# Patient Record
Sex: FEMALE | Race: WHITE | Employment: FULL TIME | URBAN - METROPOLITAN AREA
[De-identification: names, ages, dates, MRNs, and addresses within clinical notes are randomized per-mention and may not be internally consistent; named-entity substitution may affect disease eponyms.]

---

## 2017-03-13 ENCOUNTER — OFFICE VISIT (OUTPATIENT)
Dept: INTERNAL MEDICINE CLINIC | Age: 57
End: 2017-03-13

## 2017-03-13 ENCOUNTER — HOSPITAL ENCOUNTER (OUTPATIENT)
Dept: OTHER | Age: 57
Discharge: HOME | End: 2017-03-13
Attending: NURSE PRACTITIONER | Admitting: NURSE PRACTITIONER

## 2017-03-13 VITALS
DIASTOLIC BLOOD PRESSURE: 82 MMHG | SYSTOLIC BLOOD PRESSURE: 132 MMHG | TEMPERATURE: 98.2 F | BODY MASS INDEX: 36.44 KG/M2 | WEIGHT: 219 LBS

## 2017-03-13 DIAGNOSIS — R05.9 COUGH: ICD-10-CM

## 2017-03-13 DIAGNOSIS — G43.919 INTRACTABLE MIGRAINE WITHOUT STATUS MIGRAINOSUS, UNSPECIFIED MIGRAINE TYPE: Primary | ICD-10-CM

## 2017-03-13 DIAGNOSIS — N64.89 FULLNESS OF BREAST: ICD-10-CM

## 2017-03-13 DIAGNOSIS — F41.9 ANXIETY: ICD-10-CM

## 2017-03-13 DIAGNOSIS — R07.9 CHEST PAIN, UNSPECIFIED TYPE: ICD-10-CM

## 2017-03-13 PROCEDURE — 93000 ELECTROCARDIOGRAM COMPLETE: CPT | Performed by: NURSE PRACTITIONER

## 2017-03-13 PROCEDURE — 99214 OFFICE O/P EST MOD 30 MIN: CPT | Performed by: NURSE PRACTITIONER

## 2017-03-13 RX ORDER — ALPRAZOLAM 0.5 MG/1
0.5 TABLET ORAL 2 TIMES DAILY PRN
Qty: 30 TABLET | Refills: 1 | Status: SHIPPED | OUTPATIENT
Start: 2017-03-13 | End: 2017-04-12

## 2017-03-13 RX ORDER — SUMATRIPTAN 50 MG/1
50 TABLET, FILM COATED ORAL PRN
Qty: 9 TABLET | Refills: 5 | Status: SHIPPED | OUTPATIENT
Start: 2017-03-13 | End: 2018-07-09 | Stop reason: SDUPTHER

## 2017-03-13 ASSESSMENT — ENCOUNTER SYMPTOMS
SORE THROAT: 0
FACIAL SWELLING: 0
SINUS PRESSURE: 0
DIARRHEA: 0
COUGH: 1
NAUSEA: 0
VOMITING: 0

## 2017-03-16 ENCOUNTER — HOSPITAL ENCOUNTER (OUTPATIENT)
Dept: MAMMOGRAPHY | Age: 57
Discharge: HOME | End: 2017-03-16
Attending: INTERNAL MEDICINE | Admitting: INTERNAL MEDICINE

## 2017-03-16 DIAGNOSIS — N64.89 OTHER SPECIFIED DISORDERS OF BREAST: ICD-10-CM

## 2017-04-17 ENCOUNTER — OFFICE VISIT (OUTPATIENT)
Dept: INTERNAL MEDICINE CLINIC | Age: 57
End: 2017-04-17

## 2017-04-17 VITALS
WEIGHT: 213 LBS | DIASTOLIC BLOOD PRESSURE: 76 MMHG | TEMPERATURE: 98.6 F | SYSTOLIC BLOOD PRESSURE: 130 MMHG | BODY MASS INDEX: 35.45 KG/M2

## 2017-04-17 DIAGNOSIS — I10 ESSENTIAL HYPERTENSION: Primary | ICD-10-CM

## 2017-04-17 PROCEDURE — 99213 OFFICE O/P EST LOW 20 MIN: CPT | Performed by: NURSE PRACTITIONER

## 2017-04-17 ASSESSMENT — ENCOUNTER SYMPTOMS
SINUS PRESSURE: 0
NAUSEA: 0
DIARRHEA: 0
FACIAL SWELLING: 0
VOMITING: 0
COUGH: 0
SORE THROAT: 0

## 2018-03-28 ENCOUNTER — HOSPITAL ENCOUNTER (OUTPATIENT)
Dept: MAMMOGRAPHY | Age: 58
Discharge: HOME | End: 2018-03-28
Attending: OBSTETRICS & GYNECOLOGY | Admitting: OBSTETRICS & GYNECOLOGY

## 2018-03-28 DIAGNOSIS — Z12.31 VISIT FOR SCREENING MAMMOGRAM: ICD-10-CM

## 2018-05-14 ENCOUNTER — HOSPITAL ENCOUNTER (OUTPATIENT)
Dept: OTHER | Age: 58
Discharge: HOME | End: 2018-05-14
Attending: OBSTETRICS & GYNECOLOGY | Admitting: OBSTETRICS & GYNECOLOGY

## 2018-05-14 LAB
ABO/RH: NORMAL
ALBUMIN SERPL-MCNC: 4.5 G/DL (ref 3.4–5)
ANION GAP SERPL CALCULATED.3IONS-SCNC: 13 MMOL/L (ref 3–16)
ANTIBODY SCREEN: NORMAL
BASOPHILS ABSOLUTE: 0.1 K/UL (ref 0–0.2)
BASOPHILS RELATIVE PERCENT: 0.7 %
BUN BLDV-MCNC: 24 MG/DL (ref 7–20)
CALCIUM SERPL-MCNC: 9.2 MG/DL (ref 8.3–10.6)
CHLORIDE BLD-SCNC: 102 MMOL/L (ref 99–110)
CO2: 25 MMOL/L (ref 21–32)
CREAT SERPL-MCNC: 0.8 MG/DL (ref 0.6–1.1)
EOSINOPHILS ABSOLUTE: 0.1 K/UL (ref 0–0.6)
EOSINOPHILS RELATIVE PERCENT: 1.5 %
GFR AFRICAN AMERICAN: >60
GFR NON-AFRICAN AMERICAN: >60
GLUCOSE BLD-MCNC: 93 MG/DL (ref 70–99)
HCT VFR BLD CALC: 40.8 % (ref 36–48)
HEMOGLOBIN: 13.8 G/DL (ref 12–16)
LYMPHOCYTES ABSOLUTE: 1.2 K/UL (ref 1–5.1)
LYMPHOCYTES RELATIVE PERCENT: 13.3 %
MCH RBC QN AUTO: 28.9 PG (ref 26–34)
MCHC RBC AUTO-ENTMCNC: 33.8 G/DL (ref 31–36)
MCV RBC AUTO: 85.3 FL (ref 80–100)
MONOCYTES ABSOLUTE: 0.4 K/UL (ref 0–1.3)
MONOCYTES RELATIVE PERCENT: 5.1 %
NEUTROPHILS ABSOLUTE: 6.8 K/UL (ref 1.7–7.7)
NEUTROPHILS RELATIVE PERCENT: 79.4 %
PDW BLD-RTO: 13.4 % (ref 12.4–15.4)
PHOSPHORUS: 3.5 MG/DL (ref 2.5–4.9)
PLATELET # BLD: 266 K/UL (ref 135–450)
PMV BLD AUTO: 8.9 FL (ref 5–10.5)
POTASSIUM SERPL-SCNC: 4.4 MMOL/L (ref 3.5–5.1)
RBC # BLD: 4.78 M/UL (ref 4–5.2)
SODIUM BLD-SCNC: 140 MMOL/L (ref 136–145)
WBC # BLD: 8.6 K/UL (ref 4–11)

## 2018-05-15 LAB
EKG ATRIAL RATE: 58 BPM
EKG DIAGNOSIS: NORMAL
EKG P AXIS: 42 DEGREES
EKG P-R INTERVAL: 142 MS
EKG Q-T INTERVAL: 412 MS
EKG QRS DURATION: 88 MS
EKG QTC CALCULATION (BAZETT): 404 MS
EKG R AXIS: -2 DEGREES
EKG T AXIS: 13 DEGREES
EKG VENTRICULAR RATE: 58 BPM

## 2018-05-15 PROCEDURE — 93010 ELECTROCARDIOGRAM REPORT: CPT | Performed by: INTERNAL MEDICINE

## 2018-05-18 PROBLEM — D25.9 FIBROID UTERUS: Status: ACTIVE | Noted: 2018-05-18

## 2018-05-19 PROBLEM — K57.32 DIVERTICULITIS LARGE INTESTINE W/O PERFORATION OR ABSCESS W/O BLEEDING: Status: ACTIVE | Noted: 2018-05-19

## 2018-06-11 ENCOUNTER — HOSPITAL ENCOUNTER (OUTPATIENT)
Dept: CT IMAGING | Age: 58
Discharge: HOME | End: 2018-06-11
Attending: OBSTETRICS & GYNECOLOGY | Admitting: OBSTETRICS & GYNECOLOGY

## 2018-06-11 DIAGNOSIS — C56.1 MALIGNANT NEOPLASM OF RIGHT OVARY (HCC): ICD-10-CM

## 2018-06-11 DIAGNOSIS — C56.2 OVARIAN CANCER ON LEFT (HCC): ICD-10-CM

## 2018-06-18 ENCOUNTER — OFFICE VISIT (OUTPATIENT)
Dept: INTERNAL MEDICINE CLINIC | Age: 58
End: 2018-06-18

## 2018-06-18 VITALS
OXYGEN SATURATION: 97 % | WEIGHT: 217 LBS | SYSTOLIC BLOOD PRESSURE: 122 MMHG | HEIGHT: 65 IN | DIASTOLIC BLOOD PRESSURE: 82 MMHG | BODY MASS INDEX: 36.15 KG/M2 | TEMPERATURE: 98.6 F | HEART RATE: 94 BPM

## 2018-06-18 DIAGNOSIS — Z01.818 PREOP EXAMINATION: Primary | ICD-10-CM

## 2018-06-18 DIAGNOSIS — R94.31 EKG ABNORMALITIES: ICD-10-CM

## 2018-06-18 PROCEDURE — 93000 ELECTROCARDIOGRAM COMPLETE: CPT | Performed by: NURSE PRACTITIONER

## 2018-06-18 PROCEDURE — 99214 OFFICE O/P EST MOD 30 MIN: CPT | Performed by: NURSE PRACTITIONER

## 2018-06-18 RX ORDER — ALPRAZOLAM 0.5 MG/1
TABLET ORAL
COMMUNITY
End: 2018-06-19 | Stop reason: ALTCHOICE

## 2018-06-18 ASSESSMENT — PATIENT HEALTH QUESTIONNAIRE - PHQ9
1. LITTLE INTEREST OR PLEASURE IN DOING THINGS: 0
SUM OF ALL RESPONSES TO PHQ QUESTIONS 1-9: 0
SUM OF ALL RESPONSES TO PHQ9 QUESTIONS 1 & 2: 0
2. FEELING DOWN, DEPRESSED OR HOPELESS: 0

## 2018-06-19 ENCOUNTER — HOSPITAL ENCOUNTER (OUTPATIENT)
Dept: GENERAL RADIOLOGY | Age: 58
Discharge: HOME | End: 2018-06-19
Attending: INTERNAL MEDICINE | Admitting: INTERNAL MEDICINE

## 2018-06-19 ENCOUNTER — OFFICE VISIT (OUTPATIENT)
Dept: CARDIOLOGY CLINIC | Age: 58
End: 2018-06-19

## 2018-06-19 VITALS
DIASTOLIC BLOOD PRESSURE: 74 MMHG | OXYGEN SATURATION: 92 % | BODY MASS INDEX: 36.32 KG/M2 | SYSTOLIC BLOOD PRESSURE: 126 MMHG | HEIGHT: 65 IN | WEIGHT: 218 LBS | HEART RATE: 78 BPM

## 2018-06-19 DIAGNOSIS — Z01.818 PRE-OP EXAMINATION: ICD-10-CM

## 2018-06-19 DIAGNOSIS — Z01.818 PREOP EXAMINATION: ICD-10-CM

## 2018-06-19 DIAGNOSIS — R94.31 ABNORMAL EKG: ICD-10-CM

## 2018-06-19 LAB
A/G RATIO: 1.3 (ref 1.1–2.2)
ALBUMIN SERPL-MCNC: 4.3 G/DL (ref 3.4–5)
ALP BLD-CCNC: 53 U/L (ref 40–129)
ALT SERPL-CCNC: 12 U/L (ref 10–40)
ANION GAP SERPL CALCULATED.3IONS-SCNC: 18 MMOL/L (ref 3–16)
AST SERPL-CCNC: 15 U/L (ref 15–37)
BASOPHILS ABSOLUTE: 0 K/UL (ref 0–0.2)
BASOPHILS RELATIVE PERCENT: 0.8 %
BILIRUB SERPL-MCNC: <0.2 MG/DL (ref 0–1)
BUN BLDV-MCNC: 19 MG/DL (ref 7–20)
CALCIUM SERPL-MCNC: 9.1 MG/DL (ref 8.3–10.6)
CHLORIDE BLD-SCNC: 102 MMOL/L (ref 99–110)
CO2: 23 MMOL/L (ref 21–32)
CREAT SERPL-MCNC: 0.6 MG/DL (ref 0.6–1.1)
EOSINOPHILS ABSOLUTE: 0.1 K/UL (ref 0–0.6)
EOSINOPHILS RELATIVE PERCENT: 2.6 %
GFR AFRICAN AMERICAN: >60
GFR NON-AFRICAN AMERICAN: >60
GLOBULIN: 3.3 G/DL
GLUCOSE BLD-MCNC: 82 MG/DL (ref 70–99)
HCT VFR BLD CALC: 38.3 % (ref 36–48)
HEMOGLOBIN: 12.8 G/DL (ref 12–16)
LYMPHOCYTES ABSOLUTE: 0.9 K/UL (ref 1–5.1)
LYMPHOCYTES RELATIVE PERCENT: 17.9 %
MCH RBC QN AUTO: 28.2 PG (ref 26–34)
MCHC RBC AUTO-ENTMCNC: 33.3 G/DL (ref 31–36)
MCV RBC AUTO: 84.6 FL (ref 80–100)
MONOCYTES ABSOLUTE: 0.3 K/UL (ref 0–1.3)
MONOCYTES RELATIVE PERCENT: 5.4 %
NEUTROPHILS ABSOLUTE: 3.5 K/UL (ref 1.7–7.7)
NEUTROPHILS RELATIVE PERCENT: 73.3 %
PDW BLD-RTO: 13.6 % (ref 12.4–15.4)
PLATELET # BLD: 248 K/UL (ref 135–450)
PMV BLD AUTO: 9.1 FL (ref 5–10.5)
POTASSIUM SERPL-SCNC: 4.3 MMOL/L (ref 3.5–5.1)
RBC # BLD: 4.53 M/UL (ref 4–5.2)
SODIUM BLD-SCNC: 143 MMOL/L (ref 136–145)
TOTAL PROTEIN: 7.6 G/DL (ref 6.4–8.2)
WBC # BLD: 4.8 K/UL (ref 4–11)

## 2018-06-19 PROCEDURE — 99204 OFFICE O/P NEW MOD 45 MIN: CPT | Performed by: INTERNAL MEDICINE

## 2018-06-21 ENCOUNTER — HOSPITAL ENCOUNTER (OUTPATIENT)
Dept: CARDIOLOGY | Facility: CLINIC | Age: 58
Discharge: HOME | End: 2018-06-21
Attending: INTERNAL MEDICINE | Admitting: INTERNAL MEDICINE

## 2018-06-21 LAB
LV EF: 55 %
LVEF MODALITY: NORMAL

## 2018-06-22 ENCOUNTER — TELEPHONE (OUTPATIENT)
Dept: CARDIOLOGY CLINIC | Age: 58
End: 2018-06-22

## 2018-06-25 PROBLEM — C56.9 OVARIAN CANCER (HCC): Status: ACTIVE | Noted: 2018-06-25

## 2018-07-09 ENCOUNTER — OFFICE VISIT (OUTPATIENT)
Dept: INTERNAL MEDICINE CLINIC | Age: 58
End: 2018-07-09

## 2018-07-09 VITALS
BODY MASS INDEX: 35.28 KG/M2 | OXYGEN SATURATION: 97 % | HEART RATE: 74 BPM | WEIGHT: 212 LBS | DIASTOLIC BLOOD PRESSURE: 80 MMHG | SYSTOLIC BLOOD PRESSURE: 130 MMHG

## 2018-07-09 DIAGNOSIS — I48.91 ATRIAL FIBRILLATION, UNSPECIFIED TYPE (HCC): ICD-10-CM

## 2018-07-09 DIAGNOSIS — K21.9 GASTROESOPHAGEAL REFLUX DISEASE, ESOPHAGITIS PRESENCE NOT SPECIFIED: ICD-10-CM

## 2018-07-09 DIAGNOSIS — I10 ESSENTIAL HYPERTENSION: ICD-10-CM

## 2018-07-09 DIAGNOSIS — C56.1 MALIGNANT NEOPLASM OF RIGHT OVARY (HCC): ICD-10-CM

## 2018-07-09 DIAGNOSIS — F41.9 ANXIETY: Primary | ICD-10-CM

## 2018-07-09 DIAGNOSIS — G43.919 INTRACTABLE MIGRAINE WITHOUT STATUS MIGRAINOSUS, UNSPECIFIED MIGRAINE TYPE: ICD-10-CM

## 2018-07-09 DIAGNOSIS — D64.9 POSTOPERATIVE ANEMIA: ICD-10-CM

## 2018-07-09 LAB
BASOPHILS ABSOLUTE: 0.1 K/UL (ref 0–0.2)
BASOPHILS RELATIVE PERCENT: 1 %
EOSINOPHILS ABSOLUTE: 0.3 K/UL (ref 0–0.6)
EOSINOPHILS RELATIVE PERCENT: 4.3 %
HCT VFR BLD CALC: 28.7 % (ref 36–48)
HEMOGLOBIN: 9.7 G/DL (ref 12–16)
LYMPHOCYTES ABSOLUTE: 1.1 K/UL (ref 1–5.1)
LYMPHOCYTES RELATIVE PERCENT: 15.5 %
MCH RBC QN AUTO: 28 PG (ref 26–34)
MCHC RBC AUTO-ENTMCNC: 33.7 G/DL (ref 31–36)
MCV RBC AUTO: 83.1 FL (ref 80–100)
MONOCYTES ABSOLUTE: 0.5 K/UL (ref 0–1.3)
MONOCYTES RELATIVE PERCENT: 7.8 %
NEUTROPHILS ABSOLUTE: 5 K/UL (ref 1.7–7.7)
NEUTROPHILS RELATIVE PERCENT: 71.4 %
PDW BLD-RTO: 13.7 % (ref 12.4–15.4)
PLATELET # BLD: 378 K/UL (ref 135–450)
PMV BLD AUTO: 8.9 FL (ref 5–10.5)
RBC # BLD: 3.46 M/UL (ref 4–5.2)
WBC # BLD: 7 K/UL (ref 4–11)

## 2018-07-09 PROCEDURE — 99214 OFFICE O/P EST MOD 30 MIN: CPT | Performed by: NURSE PRACTITIONER

## 2018-07-09 RX ORDER — SUMATRIPTAN 50 MG/1
50 TABLET, FILM COATED ORAL PRN
Qty: 9 TABLET | Refills: 5 | Status: SHIPPED | OUTPATIENT
Start: 2018-07-09 | End: 2021-06-03 | Stop reason: SDUPTHER

## 2018-07-09 RX ORDER — ALPRAZOLAM 0.25 MG/1
0.25 TABLET ORAL NIGHTLY PRN
Qty: 30 TABLET | Refills: 0 | Status: SHIPPED | OUTPATIENT
Start: 2018-07-09 | End: 2021-06-09 | Stop reason: SDUPTHER

## 2018-07-10 ENCOUNTER — TELEPHONE (OUTPATIENT)
Dept: INTERNAL MEDICINE CLINIC | Age: 58
End: 2018-07-10

## 2018-07-10 ASSESSMENT — ENCOUNTER SYMPTOMS
VOMITING: 0
FACIAL SWELLING: 0
NAUSEA: 0
DIARRHEA: 0
COUGH: 0
SINUS PRESSURE: 0
SORE THROAT: 0

## 2018-07-10 NOTE — TELEPHONE ENCOUNTER
[facility] called. They needs additional information on script for [employer] for insurance. They need specific directions, it can not  be use as needed. [address].

## 2018-07-11 ENCOUNTER — HOSPITAL ENCOUNTER (OUTPATIENT)
Dept: OTHER | Age: 58
Discharge: HOME | End: 2018-07-11
Attending: NURSE PRACTITIONER | Admitting: NURSE PRACTITIONER

## 2018-07-11 DIAGNOSIS — M25.512 LEFT SHOULDER PAIN, UNSPECIFIED CHRONICITY: ICD-10-CM

## 2018-07-11 DIAGNOSIS — I48.91 NEW ONSET A-FIB (HCC): Primary | ICD-10-CM

## 2018-07-11 NOTE — PROGRESS NOTES
activity: Not on file     Other Topics Concern    Not on file     Social History Narrative    No narrative on file     Family History   Problem Relation Age of Onset    Diabetes Mother     High Blood Pressure Mother     Cancer Mother         UTERINE W/METS BRAIN    Asthma Sister     Thyroid Disease Sister     COPD Father        Review of Systems   Constitutional: Negative for appetite change, chills, fatigue, fever and unexpected weight change. HENT: Negative for congestion, ear discharge, ear pain, facial swelling, hearing loss, sinus pressure, sneezing and sore throat. Respiratory: Negative for cough. Cardiovascular: Negative for chest pain. Gastrointestinal: Negative for diarrhea, nausea and vomiting. Post-op hysterectomy, abdominal soreness   Genitourinary: Negative for difficulty urinating, dysuria, hematuria and urgency. Musculoskeletal: Negative for arthralgias and gait problem. Neurological: Negative for dizziness, weakness and headaches. Hematological: Negative for adenopathy. Psychiatric/Behavioral: Negative for sleep disturbance and suicidal ideas. Objective:   Physical Exam   Constitutional: She is oriented to person, place, and time. She appears well-developed and well-nourished. No distress. HENT:   Head: Normocephalic and atraumatic. Cardiovascular: Normal rate, regular rhythm, normal heart sounds and intact distal pulses. Pulmonary/Chest: Effort normal and breath sounds normal. She has no wheezes. She has no rales. Neurological: She is alert and oriented to person, place, and time. No cranial nerve deficit. Skin: She is not diaphoretic. Psychiatric: She has a normal mood and affect. Her behavior is normal. Thought content normal.       Assessment:      1. Malignant neoplasm of right ovary ([address]  - F/u Gubbi    2. Postoperative anemia  - Check lab, f/u Gubbi  - CBC Auto Differential; Future  - CBC Auto Differential    3.  Atrial fibrillation,

## 2018-07-11 NOTE — TELEPHONE ENCOUNTER
Patient had episode of A. Fib post-op hysterectomy, RESOLVED in hospital. Ovarian cancer hx and will be undergoing chemo with Dr Megan Bautista. Is there any further work-up that you would like for patient regarding A. Fib occurrence?

## 2018-07-18 ENCOUNTER — TELEPHONE (OUTPATIENT)
Dept: CARDIOLOGY CLINIC | Age: 58
End: 2018-07-18

## 2018-07-18 NOTE — TELEPHONE ENCOUNTER
You saw pt 6/19. Had echo which looked okay, nml hrt fxn, and was cleared for surg at that time. Was then found to have post-op (hysterectomy) afib and is now wearing monitor. Is she still okay to proceed with port placement?

## 2018-07-19 PROBLEM — Z01.818 PRE-OP EXAMINATION: Status: RESOLVED | Noted: 2018-06-19 | Resolved: 2018-07-19

## 2018-07-19 NOTE — TELEPHONE ENCOUNTER
Notified the patient of this. She is out of town until 8/1. EP has openings mid-end August, is this ok?  Monitor will be off as of 8/11

## 2018-08-03 ENCOUNTER — HOSPITAL ENCOUNTER (OUTPATIENT)
Dept: INTERVENTIONAL RADIOLOGY/VASCULAR | Age: 58
Discharge: HOME | End: 2018-08-03
Payer: COMMERCIAL

## 2018-08-03 VITALS
HEART RATE: 59 BPM | TEMPERATURE: 98.4 F | OXYGEN SATURATION: 95 % | WEIGHT: 204 LBS | HEIGHT: 65 IN | DIASTOLIC BLOOD PRESSURE: 77 MMHG | BODY MASS INDEX: 33.99 KG/M2 | RESPIRATION RATE: 18 BRPM | SYSTOLIC BLOOD PRESSURE: 132 MMHG

## 2018-08-03 DIAGNOSIS — C56.1 MALIGNANT NEOPLASM OF RIGHT OVARY (HCC): ICD-10-CM

## 2018-08-03 DIAGNOSIS — C56.2 MALIGNANT NEOPLASM OF LEFT OVARY (HCC): ICD-10-CM

## 2018-08-03 LAB
INR BLD: 1.1 (ref 0.86–1.14)
PROTHROMBIN TIME: 12.5 SEC (ref 9.8–13)

## 2018-08-03 PROCEDURE — 6360000002 HC RX W HCPCS: Performed by: RADIOLOGY

## 2018-08-03 PROCEDURE — 76937 US GUIDE VASCULAR ACCESS: CPT

## 2018-08-03 PROCEDURE — 36561 INSERT TUNNELED CV CATH: CPT

## 2018-08-03 PROCEDURE — 85610 PROTHROMBIN TIME: CPT

## 2018-08-03 PROCEDURE — 2500000003 HC RX 250 WO HCPCS: Performed by: RADIOLOGY

## 2018-08-03 PROCEDURE — 36415 COLL VENOUS BLD VENIPUNCTURE: CPT

## 2018-08-03 PROCEDURE — 77001 FLUOROGUIDE FOR VEIN DEVICE: CPT

## 2018-08-03 PROCEDURE — C1894 INTRO/SHEATH, NON-LASER: HCPCS

## 2018-08-03 RX ORDER — FENTANYL CITRATE 50 UG/ML
INJECTION, SOLUTION INTRAMUSCULAR; INTRAVENOUS
Status: COMPLETED | OUTPATIENT
Start: 2018-08-03 | End: 2018-08-03

## 2018-08-03 RX ORDER — MIDAZOLAM HYDROCHLORIDE 5 MG/ML
INJECTION INTRAMUSCULAR; INTRAVENOUS
Status: COMPLETED | OUTPATIENT
Start: 2018-08-03 | End: 2018-08-03

## 2018-08-03 RX ORDER — ACETAMINOPHEN 325 MG/1
650 TABLET ORAL EVERY 4 HOURS PRN
Status: DISCONTINUED | OUTPATIENT
Start: 2018-08-03 | End: 2018-08-04 | Stop reason: HOSPADM

## 2018-08-03 RX ADMIN — FENTANYL CITRATE 50 MCG: 50 INJECTION, SOLUTION INTRAMUSCULAR; INTRAVENOUS at 11:35

## 2018-08-03 RX ADMIN — MIDAZOLAM HYDROCHLORIDE 1 MG: 5 INJECTION, SOLUTION INTRAMUSCULAR; INTRAVENOUS at 11:58

## 2018-08-03 RX ADMIN — MIDAZOLAM HYDROCHLORIDE 1 MG: 5 INJECTION, SOLUTION INTRAMUSCULAR; INTRAVENOUS at 11:35

## 2018-08-03 RX ADMIN — Medication 600 MG: at 11:33

## 2018-08-03 RX ADMIN — MIDAZOLAM HYDROCHLORIDE 1 MG: 5 INJECTION, SOLUTION INTRAMUSCULAR; INTRAVENOUS at 11:40

## 2018-08-03 RX ADMIN — FENTANYL CITRATE 50 MCG: 50 INJECTION, SOLUTION INTRAMUSCULAR; INTRAVENOUS at 11:58

## 2018-08-03 RX ADMIN — MIDAZOLAM HYDROCHLORIDE 1 MG: 5 INJECTION, SOLUTION INTRAMUSCULAR; INTRAVENOUS at 11:45

## 2018-08-03 RX ADMIN — FENTANYL CITRATE 50 MCG: 50 INJECTION, SOLUTION INTRAMUSCULAR; INTRAVENOUS at 11:45

## 2018-08-03 RX ADMIN — FENTANYL CITRATE 50 MCG: 50 INJECTION, SOLUTION INTRAMUSCULAR; INTRAVENOUS at 11:40

## 2018-08-03 ASSESSMENT — PAIN - FUNCTIONAL ASSESSMENT: PAIN_FUNCTIONAL_ASSESSMENT: 0-10

## 2018-08-03 ASSESSMENT — PAIN SCALES - GENERAL: PAINLEVEL_OUTOF10: 0

## 2018-08-03 NOTE — PRE SEDATION
Sedation Pre-Procedure Note    Patient Name: Kelvin Camarillo   YOB: 1960  Room/Bed: Room/bed info not found  Medical Record Number: [id number]  Date: 8/3/2018   Time: 10:14 AM       Indication:  Port placement    Consent: I have discussed with the patient and/or the patient representative the indication, alternatives, and the possible risks and/or complications of the planned procedure and the anesthesia methods. The patient and/or patient representative appear to understand and agree to proceed. Vital Signs:   Vitals:    08/03/18 1007   BP: (!) 156/96   Pulse: 58   Resp: 18   Temp: 100 °F (37.8 °C)   SpO2: 96%       Past Medical History:   has a past medical history of Abdominal pain; Anxiety; Cancer [facility]); GERD (gastroesophageal reflux disease); Hypercholesteremia; Hypertension; Menorrhagia; Migraine; and Panic attacks. Past Surgical History:   has a past surgical history that includes Endometrial biopsy; Dilation and curettage of uterus (2/4/11); Colonoscopy; Hysterectomy (2018); and Abdominal exploration surgery (06/25/2018). Medications:   Scheduled Meds:    clindamycin (CLEOCIN) IV  600 mg Intravenous Once     Continuous Infusions:   PRN Meds:   Home Meds:   Prior to Admission medications    Medication Sig Start Date End Date Taking? Authorizing Provider   SUMAtriptan (IMITREX) 50 MG tablet Take 1 tablet by mouth as needed for Migraine 7/9/18   Adam Gracia APRN - ALBERTO   ALPRAZolam Alex Doyle) 0.25 MG tablet Take 1 tablet by mouth nightly as needed for Sleep for up to 30 days. . 7/9/18 8/8/18  Adam Gracia APRN - CNP   docusate sodium (COLACE) 100 MG capsule Take 1 capsule by mouth 2 times daily 6/29/18   Cj Hernandez,    ibuprofen (ADVIL;MOTRIN) 600 MG tablet Take 1 tablet by mouth every 6 hours as needed for Pain 6/29/18   Cj Hernandez DO   ondansetron (ZOFRAN) 4 MG tablet Take 1 tablet by mouth every 8 hours as needed for Nausea or Vomiting 6/29/18   Cj Hernandez, DO   omeprazole (PRILOSEC) 20 MG delayed release capsule Take 20 mg by mouth as needed    Historical Provider, MD     Coumadin Use Last 7 Days:  no  Antiplatelet drug therapy use last 7 days: no  Other anticoagulant use last 7 days: no  Additional Medication Information:  na      Pre-Sedation Documentation and Exam:   I have reviewed the patient's history and review of systems.     Mallampati Airway Assessment:  Mallampati Class II - (soft palate, fauces & uvula are visible)    Prior History of Anesthesia Complications:   none    ASA Classification:  Class 2 - A normal healthy patient with mild systemic disease    Sedation/ Anesthesia Plan:   intravenous sedation    Medications Planned:   midazolam (Versed) intravenously and fentanyl intravenously    Patient is an appropriate candidate for plan of sedation: yes    Electronically signed by Roxann Ortiz MD on 8/3/2018 at 10:14 AM

## 2018-08-03 NOTE — SEDATION DOCUMENTATION
6 Lao 21 cm single [employer] Power Port inserted via right axillary vein without difficulty. Patient tolerated procedure well. [facility] Returned to [employer] per stretcher.

## 2018-08-31 ENCOUNTER — OFFICE VISIT (OUTPATIENT)
Dept: CARDIOLOGY CLINIC | Age: 58
End: 2018-08-31

## 2018-08-31 VITALS
SYSTOLIC BLOOD PRESSURE: 136 MMHG | DIASTOLIC BLOOD PRESSURE: 88 MMHG | BODY MASS INDEX: 33.66 KG/M2 | WEIGHT: 202 LBS | HEART RATE: 64 BPM | HEIGHT: 65 IN

## 2018-08-31 DIAGNOSIS — I48.91 ATRIAL FIBRILLATION, UNSPECIFIED TYPE (HCC): Primary | ICD-10-CM

## 2018-08-31 PROCEDURE — 93000 ELECTROCARDIOGRAM COMPLETE: CPT | Performed by: INTERNAL MEDICINE

## 2018-08-31 PROCEDURE — 99244 OFF/OP CNSLTJ NEW/EST MOD 40: CPT | Performed by: INTERNAL MEDICINE

## 2018-08-31 PROCEDURE — 93272 ECG/REVIEW INTERPRET ONLY: CPT | Performed by: INTERNAL MEDICINE

## 2018-08-31 RX ORDER — PROCHLORPERAZINE MALEATE 10 MG
10 TABLET ORAL
COMMUNITY
Start: 2018-08-21 | End: 2019-08-04 | Stop reason: ALTCHOICE

## 2018-08-31 NOTE — PATIENT INSTRUCTIONS
RECOMMENDATIONS:  1. Discussed the risk of stroke in the setting of atrial fibrillation. 2. Discussed the possibility of asymptomatic atrial fibrillation. 3. Recommend at least Aspirin 81 mg daily. 4. Discussed a loop recorder to further evaluate your heart rhythm. 5. Call the office if you would like to proceed with loop recorder or anticoagulation.

## 2018-09-07 DIAGNOSIS — I48.91 NEW ONSET A-FIB (HCC): ICD-10-CM

## 2019-05-09 ENCOUNTER — TELEPHONE (OUTPATIENT)
Dept: INTERNAL MEDICINE CLINIC | Age: 59
End: 2019-05-09

## 2019-05-09 NOTE — TELEPHONE ENCOUNTER
Patient going on a cruise for 8 days and is asking for a RX for motion sickness patches sent to [facility]      Call back [phone]  ( Her  also needs some - see telephone encounter for DALLIN galarza  [birth year])

## 2019-05-10 RX ORDER — SCOLOPAMINE TRANSDERMAL SYSTEM 1 MG/1
1 PATCH, EXTENDED RELEASE TRANSDERMAL
Qty: 10 PATCH | Refills: 11 | Status: SHIPPED | OUTPATIENT
Start: 2019-05-10 | End: 2019-08-04 | Stop reason: ALTCHOICE

## 2019-05-10 RX ORDER — SCOLOPAMINE TRANSDERMAL SYSTEM 1 MG/1
1 PATCH, EXTENDED RELEASE TRANSDERMAL
Qty: 4 PATCH | Refills: 0 | Status: SHIPPED | OUTPATIENT
Start: 2019-05-10 | End: 2019-05-10

## 2019-08-04 ENCOUNTER — HOSPITAL ENCOUNTER (EMERGENCY)
Age: 59
Discharge: HOME | End: 2019-08-04
Attending: EMERGENCY MEDICINE
Payer: COMMERCIAL

## 2019-08-04 ENCOUNTER — APPOINTMENT (OUTPATIENT)
Dept: CT IMAGING | Age: 59
End: 2019-08-04
Payer: COMMERCIAL

## 2019-08-04 VITALS
BODY MASS INDEX: 36.65 KG/M2 | SYSTOLIC BLOOD PRESSURE: 144 MMHG | TEMPERATURE: 98.2 F | HEIGHT: 65 IN | RESPIRATION RATE: 18 BRPM | HEART RATE: 71 BPM | WEIGHT: 220 LBS | DIASTOLIC BLOOD PRESSURE: 79 MMHG | OXYGEN SATURATION: 98 %

## 2019-08-04 DIAGNOSIS — R91.1 PULMONARY NODULE: ICD-10-CM

## 2019-08-04 DIAGNOSIS — R10.84 GENERALIZED ABDOMINAL PAIN: Primary | ICD-10-CM

## 2019-08-04 LAB
A/G RATIO: 1.2 (ref 1.1–2.2)
ALBUMIN SERPL-MCNC: 3.8 G/DL (ref 3.4–5)
ALP BLD-CCNC: 83 U/L (ref 40–129)
ALT SERPL-CCNC: 15 U/L (ref 10–40)
ANION GAP SERPL CALCULATED.3IONS-SCNC: 12 MMOL/L (ref 3–16)
AST SERPL-CCNC: 19 U/L (ref 15–37)
BACTERIA: ABNORMAL /HPF
BASOPHILS ABSOLUTE: 0 K/UL (ref 0–0.2)
BASOPHILS RELATIVE PERCENT: 0.3 %
BILIRUB SERPL-MCNC: <0.2 MG/DL (ref 0–1)
BILIRUBIN URINE: NEGATIVE
BLOOD, URINE: NEGATIVE
BUN BLDV-MCNC: 14 MG/DL (ref 7–20)
CALCIUM SERPL-MCNC: 9 MG/DL (ref 8.3–10.6)
CHLORIDE BLD-SCNC: 105 MMOL/L (ref 99–110)
CLARITY: CLEAR
CO2: 24 MMOL/L (ref 21–32)
COLOR: YELLOW
CREAT SERPL-MCNC: 0.7 MG/DL (ref 0.6–1.2)
EKG ATRIAL RATE: 52 BPM
EKG DIAGNOSIS: NORMAL
EKG P AXIS: 31 DEGREES
EKG P-R INTERVAL: 150 MS
EKG Q-T INTERVAL: 438 MS
EKG QRS DURATION: 92 MS
EKG QTC CALCULATION (BAZETT): 407 MS
EKG R AXIS: -2 DEGREES
EKG T AXIS: 8 DEGREES
EKG VENTRICULAR RATE: 52 BPM
EOSINOPHILS ABSOLUTE: 0.1 K/UL (ref 0–0.6)
EOSINOPHILS RELATIVE PERCENT: 2 %
EPITHELIAL CELLS, UA: ABNORMAL /HPF
GFR AFRICAN AMERICAN: >60
GFR NON-AFRICAN AMERICAN: >60
GLOBULIN: 3.2 G/DL
GLUCOSE BLD-MCNC: 103 MG/DL (ref 70–99)
GLUCOSE URINE: NEGATIVE MG/DL
HCT VFR BLD CALC: 36.5 % (ref 36–48)
HEMOGLOBIN: 12.3 G/DL (ref 12–16)
KETONES, URINE: NEGATIVE MG/DL
LEUKOCYTE ESTERASE, URINE: ABNORMAL
LIPASE: 24 U/L (ref 13–60)
LYMPHOCYTES ABSOLUTE: 0.5 K/UL (ref 1–5.1)
LYMPHOCYTES RELATIVE PERCENT: 11 %
MCH RBC QN AUTO: 32.7 PG (ref 26–34)
MCHC RBC AUTO-ENTMCNC: 33.7 G/DL (ref 31–36)
MCV RBC AUTO: 96.9 FL (ref 80–100)
MICROSCOPIC EXAMINATION: YES
MONOCYTES ABSOLUTE: 0.3 K/UL (ref 0–1.3)
MONOCYTES RELATIVE PERCENT: 5.7 %
NEUTROPHILS ABSOLUTE: 3.7 K/UL (ref 1.7–7.7)
NEUTROPHILS RELATIVE PERCENT: 81 %
NITRITE, URINE: NEGATIVE
PDW BLD-RTO: 14.4 % (ref 12.4–15.4)
PH UA: 7 (ref 5–8)
PLATELET # BLD: 134 K/UL (ref 135–450)
PMV BLD AUTO: 7.7 FL (ref 5–10.5)
POTASSIUM SERPL-SCNC: 4.4 MMOL/L (ref 3.5–5.1)
PROTEIN UA: NEGATIVE MG/DL
RBC # BLD: 3.77 M/UL (ref 4–5.2)
RBC UA: ABNORMAL /HPF (ref 0–2)
SODIUM BLD-SCNC: 141 MMOL/L (ref 136–145)
SPECIFIC GRAVITY UA: <=1.005 (ref 1–1.03)
TOTAL PROTEIN: 7 G/DL (ref 6.4–8.2)
TROPONIN: <0.01 NG/ML
URINE REFLEX TO CULTURE: YES
URINE TYPE: ABNORMAL
UROBILINOGEN, URINE: 0.2 E.U./DL
WBC # BLD: 4.6 K/UL (ref 4–11)
WBC UA: ABNORMAL /HPF (ref 0–5)

## 2019-08-04 PROCEDURE — 74177 CT ABD & PELVIS W/CONTRAST: CPT

## 2019-08-04 PROCEDURE — 93010 ELECTROCARDIOGRAM REPORT: CPT | Performed by: INTERNAL MEDICINE

## 2019-08-04 PROCEDURE — 85025 COMPLETE CBC W/AUTO DIFF WBC: CPT

## 2019-08-04 PROCEDURE — 6360000004 HC RX CONTRAST MEDICATION: Performed by: EMERGENCY MEDICINE

## 2019-08-04 PROCEDURE — 83690 ASSAY OF LIPASE: CPT

## 2019-08-04 PROCEDURE — 99284 EMERGENCY DEPT VISIT MOD MDM: CPT

## 2019-08-04 PROCEDURE — 6370000000 HC RX 637 (ALT 250 FOR IP): Performed by: EMERGENCY MEDICINE

## 2019-08-04 PROCEDURE — 84484 ASSAY OF TROPONIN QUANT: CPT

## 2019-08-04 PROCEDURE — 93005 ELECTROCARDIOGRAM TRACING: CPT | Performed by: EMERGENCY MEDICINE

## 2019-08-04 PROCEDURE — 87086 URINE CULTURE/COLONY COUNT: CPT

## 2019-08-04 PROCEDURE — 81001 URINALYSIS AUTO W/SCOPE: CPT

## 2019-08-04 PROCEDURE — 80053 COMPREHEN METABOLIC PANEL: CPT

## 2019-08-04 PROCEDURE — 2580000003 HC RX 258: Performed by: EMERGENCY MEDICINE

## 2019-08-04 RX ORDER — DICYCLOMINE HYDROCHLORIDE 10 MG/1
10 CAPSULE ORAL ONCE
Status: COMPLETED | OUTPATIENT
Start: 2019-08-04 | End: 2019-08-04

## 2019-08-04 RX ORDER — 0.9 % SODIUM CHLORIDE 0.9 %
1000 INTRAVENOUS SOLUTION INTRAVENOUS ONCE
Status: COMPLETED | OUTPATIENT
Start: 2019-08-04 | End: 2019-08-04

## 2019-08-04 RX ORDER — DICYCLOMINE HYDROCHLORIDE 10 MG/1
10 CAPSULE ORAL
Qty: 30 CAPSULE | Refills: 0 | Status: SHIPPED | OUTPATIENT
Start: 2019-08-04 | End: 2021-12-15

## 2019-08-04 RX ADMIN — IOPAMIDOL 75 ML: 755 INJECTION, SOLUTION INTRAVENOUS at 09:31

## 2019-08-04 RX ADMIN — DICYCLOMINE HYDROCHLORIDE 10 MG: 10 CAPSULE ORAL at 11:26

## 2019-08-04 RX ADMIN — SODIUM CHLORIDE 1000 ML: 9 INJECTION, SOLUTION INTRAVENOUS at 08:52

## 2019-08-04 ASSESSMENT — PAIN SCALES - GENERAL: PAINLEVEL_OUTOF10: 1

## 2019-08-04 ASSESSMENT — ENCOUNTER SYMPTOMS
NAUSEA: 0
ABDOMINAL PAIN: 1
WHEEZING: 0
VOICE CHANGE: 0
COLOR CHANGE: 0
STRIDOR: 0
FACIAL SWELLING: 0
VOMITING: 0
TROUBLE SWALLOWING: 0
SHORTNESS OF BREATH: 0

## 2019-08-04 ASSESSMENT — PAIN DESCRIPTION - LOCATION: LOCATION: ABDOMEN

## 2019-08-04 NOTE — ED PROVIDER NOTES
[oxycodone-acetaminophen]    FAMILY HISTORY       Family History   Problem Relation Age of Onset    Diabetes Mother     High Blood Pressure Mother     Cancer Mother         UTERINE W/METS BRAIN    Asthma Sister     Thyroid Disease Sister     COPD Father           SOCIAL HISTORY       Social History     Socioeconomic History    Marital status:      Spouse name: None    Number of children: None    Years of education: None    Highest education level: None   Occupational History    None   Social Needs    Financial resource strain: None    Food insecurity:     Worry: None     Inability: None    Transportation needs:     Medical: None     Non-medical: None   Tobacco Use    Smoking status: Never Smoker    Smokeless tobacco: Never Used   Substance and Sexual Activity    Alcohol use: No    Drug use: No    Sexual activity: None   Lifestyle    Physical activity:     Days per week: None     Minutes per session: None    Stress: None   Relationships    Social connections:     Talks on phone: None     Gets together: None     Attends Spiritism service: None     Active member of club or organization: None     Attends meetings of clubs or organizations: None     Relationship status: None    Intimate partner violence:     Fear of current or ex partner: None     Emotionally abused: None     Physically abused: None     Forced sexual activity: None   Other Topics Concern    None   Social History Narrative    None         PHYSICAL EXAM    (up to 7 for level 4, 8 or more for level 5)     ED Triage Vitals [08/04/19 0747]   BP Temp Temp Source Pulse Resp SpO2 Height Weight   (!) 159/76 98.2 °F (36.8 °C) Oral 58 16 95 % 5' 5\" (1.651 m) 220 lb (99.8 kg)       Physical Exam   Constitutional: She appears well-developed and well-nourished. Pleasant  female. Nontoxic-appearing. No acute distress. HENT:   Head: Normocephalic and atraumatic.    Right Ear: External ear normal.   Left Ear: External ear normal.   Eyes: Conjunctivae and EOM are normal.   Neck: Neck supple. No JVD present. No tracheal deviation present. Cardiovascular: Normal rate and intact distal pulses. Pulmonary/Chest: Effort normal and breath sounds normal. No respiratory distress. She has no wheezes. Abdominal: Soft. Bowel sounds are normal. She exhibits no distension. There is tenderness. There is no rebound and no guarding. Normal bowel sounds in all 4 abdominal quadrants. Isolated left upper quadrant tenderness to palpation. No rebound, guarding, peritoneal signs. Removed appearing vertical incision on abdomen is intact with no signs of infection or dehiscence. Musculoskeletal: Normal range of motion. She exhibits no tenderness or deformity. Neurological: She is alert. No cranial nerve deficit. Skin: Skin is warm and dry. Capillary refill takes less than 2 seconds. She is not diaphoretic. Nursing note and vitals reviewed. DIAGNOSTIC RESULTS     EKG:All EKG's are interpreted by the Emergency Department Physician who either signs or Co-signs this chart in the absence of a cardiologist.    The Ekg interpreted by me shows  sinus bradycardia, rate=52  Axis is   Normal  QTc is  407ms  Intervals and Durations are unremarkable. ST Segments: nonspecific changes  Sinus rhythm has replaced A. fib, ventricular rate has decreased by 72, nonspecific abnormalities in lateral leads are improved from previous EKG on 6/27/2018    RADIOLOGY:     Interpretation per the Radiologist below, if available at the time of this note:    CT ABDOMEN PELVIS W IV CONTRAST Additional Contrast? None   Preliminary Result   1. No acute process within the abdomen or pelvis. 2. Patient status post hysterectomy, omentectomy, and lymphadenectomy for   history of ovarian cancer, without evidence of tumor recurrence or metastatic   disease within the abdomen or pelvis. 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    3. New

## 2019-08-05 LAB — URINE CULTURE, ROUTINE: NORMAL

## 2019-11-04 ENCOUNTER — HOSPITAL ENCOUNTER (OUTPATIENT)
Dept: VASCULAR LAB | Age: 59
Discharge: HOME | End: 2019-11-04
Payer: COMMERCIAL

## 2019-11-04 PROCEDURE — 93970 EXTREMITY STUDY: CPT

## 2020-09-17 ENCOUNTER — TELEPHONE (OUTPATIENT)
Dept: FAMILY MEDICINE CLINIC | Age: 60
End: 2020-09-17

## 2020-09-17 NOTE — TELEPHONE ENCOUNTER
----- Message from Malcolm Quiñones sent at 9/17/2020 12:41 PM EDT -----  Subject: Appointment Request    Reason for Call: Routine Physical Exam    QUESTIONS  Type of Appointment? Established Patient  Reason for appointment request? Requested Provider unavailable - Dr. Van Farley for Provider? Patient has seen Brandon Torres in the past   and would like to see her again   no available appts in search   patient would still like to see if she can be seen for a med check and   AWV   please advise  ---------------------------------------------------------------------------  --------------  CALL BACK INFO  What is the best way for the office to contact you? OK to leave message on   voicemail  Preferred Call Back Phone Number? [phone]  ---------------------------------------------------------------------------  --------------  SCRIPT ANSWERS  Relationship to Patient? Self  Appointment reason? Well Care/Follow Ups  Select a Well Care/Follow Ups appointment reason? Adult Physical Exam   [Medicare Annual Wellness   AWV   PAP   Pelvic]  (Is the patient requesting to be seen urgently for their symptoms?)? No  (If the patient is female   ask this question) Are you requesting a pap smear with your physical   exam? No  (Patient is Medicare and requesting Annual Wellness Visit)? No  Have you been diagnosed with   tested for   or told that you are suspected of having COVID-19 (Coronavirus)? No  Have you had a fever or taken medication to treat a fever within the past   3 days? No  Have you had a cough   shortness of breath or flu-like symptoms within the past 3 days? No  Do you currently have flu-like symptoms including fever or chills   cough   shortness of breath   or difficulty breathing   or new loss of taste or smell? No  (Service Expert  click yes below to proceed with [employer] As Usual   Scheduling)?  Yes

## 2020-09-23 ENCOUNTER — HOSPITAL ENCOUNTER (OUTPATIENT)
Dept: WOMENS IMAGING | Age: 60
Discharge: HOME | End: 2020-09-23
Payer: COMMERCIAL

## 2020-09-23 PROCEDURE — 77063 BREAST TOMOSYNTHESIS BI: CPT

## 2021-03-22 ENCOUNTER — IMMUNIZATION (OUTPATIENT)
Dept: PRIMARY CARE CLINIC | Age: 61
End: 2021-03-22
Payer: COMMERCIAL

## 2021-03-22 PROCEDURE — 91300 COVID-19, PFIZER VACCINE 30MCG/0.3ML DOSE: CPT | Performed by: FAMILY MEDICINE

## 2021-03-22 PROCEDURE — 0001A COVID-19, PFIZER VACCINE 30MCG/0.3ML DOSE: CPT | Performed by: FAMILY MEDICINE

## 2021-06-03 ENCOUNTER — OFFICE VISIT (OUTPATIENT)
Dept: INTERNAL MEDICINE CLINIC | Age: 61
End: 2021-06-03
Payer: COMMERCIAL

## 2021-06-03 VITALS
HEART RATE: 87 BPM | DIASTOLIC BLOOD PRESSURE: 70 MMHG | WEIGHT: 212 LBS | BODY MASS INDEX: 35.28 KG/M2 | SYSTOLIC BLOOD PRESSURE: 132 MMHG | OXYGEN SATURATION: 99 %

## 2021-06-03 DIAGNOSIS — G43.919 INTRACTABLE MIGRAINE WITHOUT STATUS MIGRAINOSUS, UNSPECIFIED MIGRAINE TYPE: ICD-10-CM

## 2021-06-03 DIAGNOSIS — M54.50 ACUTE LEFT-SIDED LOW BACK PAIN WITHOUT SCIATICA: Primary | ICD-10-CM

## 2021-06-03 DIAGNOSIS — F41.9 ANXIETY: ICD-10-CM

## 2021-06-03 PROCEDURE — 99213 OFFICE O/P EST LOW 20 MIN: CPT | Performed by: NURSE PRACTITIONER

## 2021-06-03 RX ORDER — IBUPROFEN 600 MG/1
600 TABLET ORAL EVERY 8 HOURS PRN
Qty: 30 TABLET | Refills: 0 | Status: SHIPPED | OUTPATIENT
Start: 2021-06-03 | End: 2021-06-10

## 2021-06-03 RX ORDER — SUMATRIPTAN 50 MG/1
50 TABLET, FILM COATED ORAL PRN
Qty: 9 TABLET | Refills: 5 | Status: SHIPPED | OUTPATIENT
Start: 2021-06-03 | End: 2021-12-17

## 2021-06-03 RX ORDER — ALPRAZOLAM 0.5 MG/1
1 TABLET ORAL
COMMUNITY
End: 2021-06-09

## 2021-06-03 ASSESSMENT — ENCOUNTER SYMPTOMS
ABDOMINAL DISTENTION: 0
NAUSEA: 0
VOMITING: 0
DIARRHEA: 0
ABDOMINAL PAIN: 0
BOWEL INCONTINENCE: 0
SHORTNESS OF BREATH: 0
CONSTIPATION: 0
BACK PAIN: 1
CHEST TIGHTNESS: 0

## 2021-06-03 NOTE — PROGRESS NOTES
[address] [facility]  [address]  Tel:[phone]    Rosi Oneill is a 56 y.o. female who presents today for her medical conditions/complaints as noted below. Rosi Oneill is c/o of Flank Pain (L side lower back / no trauma / recent yard work )      Chief Complaint   Patient presents with    Flank Pain     L side lower back / no trauma / recent yard work        HPI:     Back Pain  This is a new problem. The current episode started in the past 7 days. The problem occurs constantly. The problem is unchanged. The pain is present in the sacro-iliac. The quality of the pain is described as aching and cramping. The pain does not radiate. The pain is at a severity of 4/10. The pain is moderate. The pain is the same all the time. The symptoms are aggravated by bending and position. Stiffness is present in the morning. Pertinent negatives include no abdominal pain, bladder incontinence, bowel incontinence, chest pain, leg pain, paresthesias, perianal numbness, weakness or weight loss. Risk factors include obesity. She has tried nothing for the symptoms. The treatment provided no relief.        Past Medical History:   Diagnosis Date    Abdominal pain     Anxiety     Cancer ([address]     ovarian    GERD (gastroesophageal reflux disease)     Hypercholesteremia     was on meds then weight loss    Hypertension     benigh essential - was on meds in past lost 50 lbs    Menorrhagia     Migraine     Panic attacks         Past Surgical History:   Procedure Laterality Date    ABDOMINAL EXPLORATION SURGERY  06/25/2018    trachelectomy, plvic and pasquale-aortic lymphadenectomy, omentectomy    COLONOSCOPY      DILATION AND CURETTAGE OF UTERUS  2/4/11    D & C, HYSTEROSCOPY W/ NOVASURE ABLATION    ENDOMETRIAL BIOPSY      11/30/2010    HYSTERECTOMY  2018    partial    TUNNELED VENOUS PORT PLACEMENT Right 08/03/2018    [employer] Cuff Size: Large Adult   Pulse: 87   SpO2: 99%   Weight: 212 lb (96.2 kg)       Physical Exam  Vitals and nursing note reviewed. Constitutional:       Appearance: Normal appearance. She is well-developed. HENT:      Head: Normocephalic and atraumatic. Right Ear: Hearing and external ear normal.      Left Ear: Hearing and external ear normal.      Nose: Nose normal.   Eyes:      General: Lids are normal.      Pupils: Pupils are equal, round, and reactive to light. Cardiovascular:      Rate and Rhythm: Normal rate and regular rhythm. No extrasystoles are present. Chest Wall: PMI is not displaced. Pulses: Normal pulses. Heart sounds: Normal heart sounds, S1 normal and S2 normal. Heart sounds not distant. No murmur heard. No systolic murmur is present. No diastolic murmur is present. No friction rub. No gallop. No S3 or S4 sounds. Pulmonary:      Effort: Pulmonary effort is normal. No accessory muscle usage or respiratory distress. Breath sounds: Normal breath sounds. No decreased breath sounds, wheezing, rhonchi or rales. Abdominal:      General: Bowel sounds are normal.      Palpations: Abdomen is soft. Musculoskeletal:      Cervical back: Normal range of motion. Back:       Right hip: No tenderness, bony tenderness or crepitus. Normal range of motion. Left hip: Tenderness present. No bony tenderness or crepitus. Normal range of motion. Skin:     General: Skin is warm and dry. Neurological:      Mental Status: She is alert. Psychiatric:         Speech: Speech normal.         Assessment & Plan: The following diagnoses and conditions are stable with no further orders unless indicated:    1. Acute left-sided low back pain without sciatica    2. Anxiety    3. Intractable migraine without status migrainosus, unspecified migraine type        Aretha Farfan was seen today for flank pain.     Diagnoses and all orders for this visit:    Acute left-sided low back pain without sciatica  -     ibuprofen (ADVIL;MOTRIN) 600 MG tablet; Take 1 tablet by mouth every 8 hours as needed for Pain    Symptoms likely due to overuse/inflammation of low back/possible SI joint. Recommend NSAID for relief of inflammation. Can trial stretching /flexibility exercises to loosen tightness/muscle pain. Heating pad could also benefit pain. Return if symptoms worsen or fail to improve. Patientshould call the office immediately with new or ongoing signs or symptoms or worsening, or proceed to the emergency room. If you are on medications which could impair your senses, you are at risk of weakness, falls,dizziness, or drowsiness. You should be careful during activities which could place you at risk of harm, such as climbing, using stairs, operating machinery, or driving vehicles. If you feel you cannot safely do theseactivities, you should request others to help you, or avoid the activities altogether. If you are drowsy for any other reason, you should use the same precautions as listed above. Call if pattern of symptoms change or persists for an extended time.       Pranay Barcenas

## 2021-06-08 DIAGNOSIS — F41.9 ANXIETY: ICD-10-CM

## 2021-06-08 NOTE — TELEPHONE ENCOUNTER
Patient talked to supriya at her appointment about ordering Alprazolam for her. She hasn't ordered it since 2019, but says its something she keeps on hand in case she needs it.    Asks if supriya will order for her

## 2021-06-09 RX ORDER — ALPRAZOLAM 0.25 MG/1
0.25 TABLET ORAL NIGHTLY PRN
Qty: 30 TABLET | Refills: 0 | Status: SHIPPED | OUTPATIENT
Start: 2021-06-09 | End: 2021-07-09

## 2021-06-10 ENCOUNTER — TELEPHONE (OUTPATIENT)
Dept: INTERNAL MEDICINE CLINIC | Age: 61
End: 2021-06-10

## 2021-06-10 DIAGNOSIS — M54.50 ACUTE LEFT-SIDED LOW BACK PAIN WITHOUT SCIATICA: ICD-10-CM

## 2021-06-10 RX ORDER — IBUPROFEN 600 MG/1
TABLET ORAL
Qty: 30 TABLET | Refills: 0 | Status: SHIPPED | OUTPATIENT
Start: 2021-06-10 | End: 2021-06-22 | Stop reason: ALTCHOICE

## 2021-06-22 NOTE — TELEPHONE ENCOUNTER
Patient said that she doesn't need any more motrin. She had 2 RX written, one from supriya and one from Dr. Arma Cockayne , and she doesn't take this every day. Just when needed and she never takes more than 2 a day.     She is still Dr. Diana Course patient - she only saw supriya because there was not an appt for Dr. Britney Tapia

## 2021-10-13 ENCOUNTER — TELEPHONE (OUTPATIENT)
Dept: INTERNAL MEDICINE CLINIC | Age: 61
End: 2021-10-13

## 2021-10-13 NOTE — TELEPHONE ENCOUNTER
----- Message from Cathy Lopez sent at 10/13/2021  4:03 PM EDT -----  Subject: Message to Provider    QUESTIONS  Information for Provider? Patient needs to find new provider, wants to see   Sigrid Gill as she has seen her before and  also does see [facility]. Patient has been to you office for 32 years and does not want to change   offices. Please to confirm. Is having CT scan and will fax results to   [facility].  ---------------------------------------------------------------------------  --------------  [employer]  What is the best way for the office to contact you? OK to leave message on   voicemail  Preferred Call Back Phone Number? [phone]  ---------------------------------------------------------------------------  --------------  SCRIPT ANSWERS  Relationship to Patient?  Self

## 2021-10-13 NOTE — TELEPHONE ENCOUNTER
Lm on vm informing patient we would submit this request to [facility] and will let patient know when we receive an answer.

## 2021-10-22 ENCOUNTER — TELEPHONE (OUTPATIENT)
Dept: INTERNAL MEDICINE CLINIC | Age: 61
End: 2021-10-22

## 2021-10-22 DIAGNOSIS — Z00.00 ROUTINE GENERAL MEDICAL EXAMINATION AT A HEALTH CARE FACILITY: Primary | ICD-10-CM

## 2021-10-22 DIAGNOSIS — Z11.59 ENCOUNTER FOR HEPATITIS C SCREENING TEST FOR LOW RISK PATIENT: ICD-10-CM

## 2021-10-22 NOTE — TELEPHONE ENCOUNTER
Patient is scheduled for a Physical with Samir on 12/17/21. Please put lab orders in [facility]. Patient gets her labs done through [facility].   Please call patient at [phone] once in [facility]>

## 2021-10-22 NOTE — TELEPHONE ENCOUNTER
Called patient to let her know that John Rose has agreed to be her PCP. Scheduled patient for a physical with Mabel Quinn on 12/17/21. Instructed patient to bring all medications to appointment.

## 2021-11-01 ENCOUNTER — NURSE ONLY (OUTPATIENT)
Dept: INTERNAL MEDICINE CLINIC | Age: 61
End: 2021-11-01
Payer: COMMERCIAL

## 2021-11-01 ENCOUNTER — TELEPHONE (OUTPATIENT)
Dept: INTERNAL MEDICINE CLINIC | Age: 61
End: 2021-11-01

## 2021-11-01 DIAGNOSIS — T14.8XXA ABRASION: Primary | ICD-10-CM

## 2021-11-01 PROCEDURE — 90471 IMMUNIZATION ADMIN: CPT | Performed by: NURSE PRACTITIONER

## 2021-11-01 PROCEDURE — 90714 TD VACC NO PRESV 7 YRS+ IM: CPT | Performed by: NURSE PRACTITIONER

## 2021-11-01 NOTE — TELEPHONE ENCOUNTER
[facility] called and said patient needed a tetanus shot. She scraped her foot. Her last shot was 1/6/2012   Please place order.   She made a nurse visit

## 2021-11-01 NOTE — TELEPHONE ENCOUNTER
Spoke with patient and examined wound. No s/s of infection. Josiah Miranda continue to monitor. Update Td.

## 2021-12-10 ENCOUNTER — IMMUNIZATION (OUTPATIENT)
Dept: PRIMARY CARE CLINIC | Age: 61
End: 2021-12-10
Payer: COMMERCIAL

## 2021-12-10 PROCEDURE — 91300 COVID-19, PFIZER VACCINE 30MCG/0.3ML DOSE: CPT | Performed by: FAMILY MEDICINE

## 2021-12-10 PROCEDURE — 0004A COVID-19, PFIZER VACCINE 30MCG/0.3ML DOSE: CPT | Performed by: FAMILY MEDICINE

## 2021-12-13 ENCOUNTER — HOSPITAL ENCOUNTER (OUTPATIENT)
Age: 61
Discharge: HOME | End: 2021-12-13
Payer: COMMERCIAL

## 2021-12-13 DIAGNOSIS — Z00.00 ROUTINE GENERAL MEDICAL EXAMINATION AT A HEALTH CARE FACILITY: ICD-10-CM

## 2021-12-13 DIAGNOSIS — Z11.59 ENCOUNTER FOR HEPATITIS C SCREENING TEST FOR LOW RISK PATIENT: ICD-10-CM

## 2021-12-13 LAB
A/G RATIO: 1.4 (ref 1.1–2.2)
ALBUMIN SERPL-MCNC: 4 G/DL (ref 3.4–5)
ALP BLD-CCNC: 70 U/L (ref 40–129)
ALT SERPL-CCNC: 14 U/L (ref 10–40)
ANION GAP SERPL CALCULATED.3IONS-SCNC: 12 MMOL/L (ref 3–16)
AST SERPL-CCNC: 17 U/L (ref 15–37)
BASOPHILS ABSOLUTE: 0 K/UL (ref 0–0.2)
BASOPHILS RELATIVE PERCENT: 0.9 %
BILIRUB SERPL-MCNC: 0.3 MG/DL (ref 0–1)
BUN BLDV-MCNC: 18 MG/DL (ref 7–20)
CALCIUM SERPL-MCNC: 8.7 MG/DL (ref 8.3–10.6)
CHLORIDE BLD-SCNC: 106 MMOL/L (ref 99–110)
CHOLESTEROL, TOTAL: 223 MG/DL (ref 0–199)
CO2: 21 MMOL/L (ref 21–32)
CREAT SERPL-MCNC: 0.7 MG/DL (ref 0.6–1.2)
EOSINOPHILS ABSOLUTE: 0.1 K/UL (ref 0–0.6)
EOSINOPHILS RELATIVE PERCENT: 3.7 %
GFR AFRICAN AMERICAN: >60
GFR NON-AFRICAN AMERICAN: >60
GLUCOSE BLD-MCNC: 93 MG/DL (ref 70–99)
HCT VFR BLD CALC: 40.5 % (ref 36–48)
HDLC SERPL-MCNC: 42 MG/DL (ref 40–60)
HEMOGLOBIN: 13.1 G/DL (ref 12–16)
HEPATITIS C ANTIBODY INTERPRETATION: NORMAL
LDL CHOLESTEROL CALCULATED: 150 MG/DL
LYMPHOCYTES ABSOLUTE: 0.7 K/UL (ref 1–5.1)
LYMPHOCYTES RELATIVE PERCENT: 17 %
MCH RBC QN AUTO: 29.1 PG (ref 26–34)
MCHC RBC AUTO-ENTMCNC: 32.5 G/DL (ref 31–36)
MCV RBC AUTO: 89.7 FL (ref 80–100)
MONOCYTES ABSOLUTE: 0.3 K/UL (ref 0–1.3)
MONOCYTES RELATIVE PERCENT: 6.7 %
NEUTROPHILS ABSOLUTE: 2.8 K/UL (ref 1.7–7.7)
NEUTROPHILS RELATIVE PERCENT: 71.7 %
PDW BLD-RTO: 13.5 % (ref 12.4–15.4)
PLATELET # BLD: 214 K/UL (ref 135–450)
PMV BLD AUTO: 8.9 FL (ref 5–10.5)
POTASSIUM SERPL-SCNC: 4 MMOL/L (ref 3.5–5.1)
RBC # BLD: 4.51 M/UL (ref 4–5.2)
SODIUM BLD-SCNC: 139 MMOL/L (ref 136–145)
TOTAL PROTEIN: 6.9 G/DL (ref 6.4–8.2)
TRIGL SERPL-MCNC: 156 MG/DL (ref 0–150)
TSH REFLEX: 0.75 UIU/ML (ref 0.27–4.2)
VLDLC SERPL CALC-MCNC: 31 MG/DL
WBC # BLD: 3.9 K/UL (ref 4–11)

## 2021-12-13 PROCEDURE — 80061 LIPID PANEL: CPT

## 2021-12-13 PROCEDURE — 80053 COMPREHEN METABOLIC PANEL: CPT

## 2021-12-13 PROCEDURE — 86803 HEPATITIS C AB TEST: CPT

## 2021-12-13 PROCEDURE — 36415 COLL VENOUS BLD VENIPUNCTURE: CPT

## 2021-12-13 PROCEDURE — 84443 ASSAY THYROID STIM HORMONE: CPT

## 2021-12-13 PROCEDURE — 85025 COMPLETE CBC W/AUTO DIFF WBC: CPT

## 2021-12-17 ENCOUNTER — OFFICE VISIT (OUTPATIENT)
Dept: INTERNAL MEDICINE CLINIC | Age: 61
End: 2021-12-17
Payer: COMMERCIAL

## 2021-12-17 VITALS
OXYGEN SATURATION: 99 % | HEIGHT: 65 IN | TEMPERATURE: 98.3 F | DIASTOLIC BLOOD PRESSURE: 64 MMHG | WEIGHT: 225 LBS | SYSTOLIC BLOOD PRESSURE: 128 MMHG | HEART RATE: 73 BPM | BODY MASS INDEX: 37.49 KG/M2

## 2021-12-17 DIAGNOSIS — G43.109 MIGRAINE WITH AURA AND WITHOUT STATUS MIGRAINOSUS, NOT INTRACTABLE: ICD-10-CM

## 2021-12-17 DIAGNOSIS — F41.9 ANXIETY: ICD-10-CM

## 2021-12-17 DIAGNOSIS — Z86.79 HISTORY OF ATRIAL FIBRILLATION: ICD-10-CM

## 2021-12-17 DIAGNOSIS — E78.00 HYPERCHOLESTEREMIA: ICD-10-CM

## 2021-12-17 DIAGNOSIS — Z00.00 ROUTINE GENERAL MEDICAL EXAMINATION AT A HEALTH CARE FACILITY: Primary | ICD-10-CM

## 2021-12-17 DIAGNOSIS — Z85.43 HISTORY OF OVARIAN CANCER: ICD-10-CM

## 2021-12-17 PROBLEM — T14.8XXA ABRASION: Status: RESOLVED | Noted: 2021-11-01 | Resolved: 2021-12-17

## 2021-12-17 PROBLEM — K57.32 DIVERTICULITIS LARGE INTESTINE W/O PERFORATION OR ABSCESS W/O BLEEDING: Status: RESOLVED | Noted: 2018-05-19 | Resolved: 2021-12-17

## 2021-12-17 PROBLEM — D25.9 FIBROID UTERUS: Status: RESOLVED | Noted: 2018-05-18 | Resolved: 2021-12-17

## 2021-12-17 PROCEDURE — 99396 PREV VISIT EST AGE 40-64: CPT | Performed by: NURSE PRACTITIONER

## 2021-12-17 RX ORDER — ALPRAZOLAM 0.25 MG/1
0.25 TABLET ORAL NIGHTLY PRN
COMMUNITY

## 2021-12-17 SDOH — ECONOMIC STABILITY: FOOD INSECURITY: WITHIN THE PAST 12 MONTHS, YOU WORRIED THAT YOUR FOOD WOULD RUN OUT BEFORE YOU GOT MONEY TO BUY MORE.: NEVER TRUE

## 2021-12-17 SDOH — ECONOMIC STABILITY: FOOD INSECURITY: WITHIN THE PAST 12 MONTHS, THE FOOD YOU BOUGHT JUST DIDN'T LAST AND YOU DIDN'T HAVE MONEY TO GET MORE.: NEVER TRUE

## 2021-12-17 ASSESSMENT — SOCIAL DETERMINANTS OF HEALTH (SDOH): HOW HARD IS IT FOR YOU TO PAY FOR THE VERY BASICS LIKE FOOD, HOUSING, MEDICAL CARE, AND HEATING?: NOT HARD AT ALL

## 2021-12-17 ASSESSMENT — PATIENT HEALTH QUESTIONNAIRE - PHQ9
SUM OF ALL RESPONSES TO PHQ9 QUESTIONS 1 & 2: 1
SUM OF ALL RESPONSES TO PHQ QUESTIONS 1-9: 1
SUM OF ALL RESPONSES TO PHQ QUESTIONS 1-9: 1
1. LITTLE INTEREST OR PLEASURE IN DOING THINGS: 0
2. FEELING DOWN, DEPRESSED OR HOPELESS: 1
SUM OF ALL RESPONSES TO PHQ QUESTIONS 1-9: 1

## 2021-12-17 NOTE — PATIENT INSTRUCTIONS
Please bring all medications in original bottles to next appointment.     > Consider [facility] or Dr Elizabeth Barry    > Update dental exam 2022    > Consider influenza vaccine and shingrix vaccines. Nurse visits in our office.

## 2021-12-17 NOTE — PROGRESS NOTES
Panfilo Ball  1960        Chief Complaint   Patient presents with    Annual Exam    Anxiety     has alprazolam /has not needed     Other     ovarian cancer / DX 3 years     Flu Vaccine     considering     Immunizations     ? shingrix        Assessment/Plan:     1. Routine general medical examination at a health care facility  Continue healthy lifestyle choices. Enc exercise regularly and good dietary intake. Recommend annual eye exams; biannual dental exams. Update labs in 1 year. Update dental visits and influenza and shingrix vaccine. - Comprehensive Metabolic Panel; Future  - CBC Auto Differential; Future  - Lipid Panel; Future  - TSH with Reflex; Future    2. Anxiety  Maintain Xanax rarely  Controlled Substance Monitoring:    Acute and Chronic Pain Monitoring:   RX Monitoring 12/26/2021   Attestation -   Periodic Controlled Substance Monitoring No signs of potential drug abuse or diversion identified. Chronic Pain > 120 MEDD Obtained or confirmed \"Medication Contract\" on file. 3. History of ovarian cancer  Monitor. F/u Oncology    4. Hypercholesteremia  Monitor. Educated pt on continued dietary changes and more exercise    5. Migraine with aura and without status migrainosus, not intractable  Monitor, maintain Excedrin as needed    6. History of atrial fibrillation  Monitor          HPI:      She is not walking as much and stress is high with work. She is not a member at a gym at this time but goal to start stationary bike. Hx Migraines and these have increased. Excedrin PM. States her migraines are ocular. Weather changes make symptoms worse. 4-5 times a month she will use Excedrin. Does not miss work. Anxiety. She RARELY takes medication, takes 1 in the last 6 years (Xanax)    Hx A Fib - saw Dr Lizandro Dela Cruz 2018. Did not do loop recorder and denies symptoms. HLD. . No statin.  The 10-year ASCVD risk score (Omar Rodriges., et al., 2013) is: 5.6%    Values used to calculate the score:      Age: 59 years      Sex: Female      Is Non- : No      Diabetic: No      Tobacco smoker: No      Systolic Blood Pressure: 197 mmHg      Is BP treated: No      HDL Cholesterol: 42 mg/dL      Total Cholesterol: 223 mg/dL    Hx ovarian cancer. Diverticulosis. Dermatology: due. Eye: UTD. Dental: due. /64 (Site: Right Upper Arm, Position: Sitting, Cuff Size: Large Adult)   Pulse 73   Temp 98.3 °F (36.8 °C) (Temporal)   Ht 5' 5\" (1.651 m)   Wt 225 lb (102.1 kg)   LMP  (LMP Unknown)   SpO2 99%   BMI 37.44 kg/m²     Prior to Visit Medications    Medication Sig Taking? Authorizing Provider   ALPRAZolam (XANAX) 0.25 MG tablet Take 0.25 mg by mouth nightly as needed for Sleep.  Yes Historical Provider, MD     Family History   Problem Relation Age of Onset    Diabetes Mother     High Blood Pressure Mother     Cancer Mother         UTERINE W/METS BRAIN    COPD Father     Asthma Sister     Thyroid Disease Sister     Heart Disease Sister     Sleep Apnea Sister     Thyroid Disease Sister      Social History     Socioeconomic History    Marital status:      Spouse name: Not on file    Number of children: Not on file    Years of education: Not on file    Highest education level: Not on file   Occupational History    Not on file   Tobacco Use    Smoking status: Never Smoker    Smokeless tobacco: Never Used   Vaping Use    Vaping Use: Never used   Substance and Sexual Activity    Alcohol use: No    Drug use: No    Sexual activity: Not on file   Other Topics Concern    Not on file   Social History Narrative    Not on file     Social Determinants of Health     Financial Resource Strain: Low Risk     Difficulty of Paying Living Expenses: Not hard at all   Food Insecurity: No Food Insecurity    Worried About Running Out of Food in the Last Year: Never true    [place] of Food in the Last Year: Never true   Transportation Needs:     Lack of Transportation (Medical): Not on file    Lack of Transportation (Non-Medical): Not on file   Physical Activity:     Days of Exercise per Week: Not on file    Minutes of Exercise per Session: Not on file   Stress:     Feeling of Stress : Not on file   Social Connections:     Frequency of Communication with Friends and Family: Not on file    Frequency of Social Gatherings with Friends and Family: Not on file    Attends Christian Services: Not on file    Active Member of [address] [place] [address] or Organizations: Not on file    Attends Club or Organization Meetings: Not on file    Marital Status: Not on file   Intimate Partner Violence:     Fear of Current or Ex-Partner: Not on file    Emotionally Abused: Not on file    Physically Abused: Not on file    Sexually Abused: Not on file   Housing Stability:     Unable to Pay for Housing in the Last Year: Not on file    Number of [place] in the Last Year: Not on file    Unstable Housing in the Last Year: Not on file       Review of Systems   Constitutional: Negative for appetite change, chills, fatigue, fever and unexpected weight change. HENT: Negative for congestion, ear discharge, ear pain, facial swelling, hearing loss, sinus pressure, sneezing and sore throat. Respiratory: Negative for cough. Cardiovascular: Negative for chest pain. Gastrointestinal: Negative for diarrhea, nausea and vomiting. Genitourinary: Negative for difficulty urinating, dysuria, hematuria and urgency. Musculoskeletal: Negative for arthralgias and gait problem. Neurological: Positive for headaches. Negative for dizziness and weakness. Hematological: Negative for adenopathy. Psychiatric/Behavioral: Negative for sleep disturbance and suicidal ideas. The patient is nervous/anxious. Physical Exam  Vitals reviewed. Constitutional:       Appearance: She is obese. HENT:      Head: Normocephalic.       Right Ear: Tympanic membrane, ear canal and external ear normal. Left Ear: Tympanic membrane, ear canal and external ear normal.      Nose: Nose normal.      Mouth/Throat:      Mouth: Mucous membranes are moist.      Pharynx: Oropharynx is clear. Eyes:      Extraocular Movements: Extraocular movements intact. Conjunctiva/sclera: Conjunctivae normal.      Pupils: Pupils are equal, round, and reactive to light. Neck:      Vascular: No carotid bruit. Cardiovascular:      Rate and Rhythm: Normal rate and regular rhythm. Pulses: Normal pulses. Heart sounds: Normal heart sounds. Pulmonary:      Effort: Pulmonary effort is normal.      Breath sounds: Normal breath sounds. Abdominal:      General: Abdomen is flat. Bowel sounds are normal.      Palpations: There is no mass. Tenderness: There is no abdominal tenderness. Musculoskeletal:      Right lower leg: No edema. Left lower leg: No edema. Lymphadenopathy:      Cervical: No cervical adenopathy. Neurological:      General: No focal deficit present. Mental Status: She is alert and oriented to person, place, and time. Psychiatric:         Mood and Affect: Mood normal.         Thought Content: Thought content normal.         Judgment: Judgment normal.             See above plan.      Return in about 1 year (around 12/17/2022) for Physical.    Jesus Myles, APRN - CNP

## 2021-12-26 PROBLEM — Z85.43 HISTORY OF OVARIAN CANCER: Status: ACTIVE | Noted: 2021-12-26

## 2021-12-26 PROBLEM — C56.9 OVARIAN CANCER (HCC): Status: RESOLVED | Noted: 2018-06-25 | Resolved: 2021-12-26

## 2021-12-26 ASSESSMENT — ENCOUNTER SYMPTOMS
COUGH: 0
FACIAL SWELLING: 0
VOMITING: 0
DIARRHEA: 0
SORE THROAT: 0
NAUSEA: 0
SINUS PRESSURE: 0

## 2021-12-28 ENCOUNTER — NURSE ONLY (OUTPATIENT)
Dept: INTERNAL MEDICINE CLINIC | Age: 61
End: 2021-12-28
Payer: COMMERCIAL

## 2021-12-28 DIAGNOSIS — Z23 NEED FOR INFLUENZA VACCINATION: Primary | ICD-10-CM

## 2021-12-28 PROCEDURE — 99999 PR OFFICE/OUTPT VISIT,PROCEDURE ONLY: CPT | Performed by: NURSE PRACTITIONER

## 2021-12-28 PROCEDURE — 90686 IIV4 VACC NO PRSV 0.5 ML IM: CPT | Performed by: NURSE PRACTITIONER

## 2021-12-28 PROCEDURE — 90471 IMMUNIZATION ADMIN: CPT | Performed by: NURSE PRACTITIONER

## 2022-02-15 ENCOUNTER — NURSE ONLY (OUTPATIENT)
Dept: INTERNAL MEDICINE CLINIC | Age: 62
End: 2022-02-15
Payer: COMMERCIAL

## 2022-02-15 DIAGNOSIS — Z23 IMMUNIZATION DUE: Primary | ICD-10-CM

## 2022-02-15 PROCEDURE — 90471 IMMUNIZATION ADMIN: CPT | Performed by: NURSE PRACTITIONER

## 2022-02-15 PROCEDURE — 90750 HZV VACC RECOMBINANT IM: CPT | Performed by: NURSE PRACTITIONER

## 2022-06-14 ENCOUNTER — NURSE ONLY (OUTPATIENT)
Dept: INTERNAL MEDICINE CLINIC | Age: 62
End: 2022-06-14
Payer: COMMERCIAL

## 2022-06-14 DIAGNOSIS — Z23 NEED FOR SHINGLES VACCINE: Primary | ICD-10-CM

## 2022-06-14 PROCEDURE — 90750 HZV VACC RECOMBINANT IM: CPT | Performed by: NURSE PRACTITIONER

## 2022-06-14 PROCEDURE — 90471 IMMUNIZATION ADMIN: CPT | Performed by: NURSE PRACTITIONER

## 2022-06-14 PROCEDURE — 99999 PR OFFICE/OUTPT VISIT,PROCEDURE ONLY: CPT | Performed by: NURSE PRACTITIONER

## 2022-09-20 ENCOUNTER — HOSPITAL ENCOUNTER (OUTPATIENT)
Dept: WOMENS IMAGING | Age: 62
Discharge: HOME | End: 2022-09-20
Payer: COMMERCIAL

## 2022-09-20 DIAGNOSIS — Z12.31 ENCOUNTER FOR SCREENING MAMMOGRAM FOR BREAST CANCER: ICD-10-CM

## 2022-09-20 PROCEDURE — 77063 BREAST TOMOSYNTHESIS BI: CPT

## 2022-11-10 ENCOUNTER — NURSE ONLY (OUTPATIENT)
Dept: INTERNAL MEDICINE CLINIC | Age: 62
End: 2022-11-10
Payer: COMMERCIAL

## 2022-11-10 DIAGNOSIS — Z23 NEED FOR INFLUENZA VACCINATION: Primary | ICD-10-CM

## 2022-11-10 PROCEDURE — 99999 PR OFFICE/OUTPT VISIT,PROCEDURE ONLY: CPT | Performed by: NURSE PRACTITIONER

## 2022-11-10 PROCEDURE — 90471 IMMUNIZATION ADMIN: CPT | Performed by: NURSE PRACTITIONER

## 2022-11-10 PROCEDURE — 90674 CCIIV4 VAC NO PRSV 0.5 ML IM: CPT | Performed by: NURSE PRACTITIONER

## 2023-03-22 ENCOUNTER — APPOINTMENT (OUTPATIENT)
Dept: GENERAL RADIOLOGY | Age: 63
End: 2023-03-22
Payer: COMMERCIAL

## 2023-03-22 ENCOUNTER — HOSPITAL ENCOUNTER (OUTPATIENT)
Age: 63
Setting detail: OBSERVATION
Discharge: HOME | End: 2023-03-23
Attending: STUDENT IN AN ORGANIZED HEALTH CARE EDUCATION/TRAINING PROGRAM | Admitting: INTERNAL MEDICINE
Payer: COMMERCIAL

## 2023-03-22 DIAGNOSIS — R07.9 CHEST PAIN, UNSPECIFIED TYPE: Primary | ICD-10-CM

## 2023-03-22 LAB
ALBUMIN SERPL-MCNC: 4.1 G/DL (ref 3.4–5)
ALBUMIN/GLOB SERPL: 1.3 {RATIO} (ref 1.1–2.2)
ALP SERPL-CCNC: 77 U/L (ref 40–129)
ALT SERPL-CCNC: 18 U/L (ref 10–40)
ANION GAP SERPL CALCULATED.3IONS-SCNC: 10 MMOL/L (ref 3–16)
AST SERPL-CCNC: 17 U/L (ref 15–37)
BASOPHILS # BLD: 0.1 K/UL (ref 0–0.2)
BASOPHILS NFR BLD: 2 %
BILIRUB SERPL-MCNC: 0.3 MG/DL (ref 0–1)
BUN SERPL-MCNC: 17 MG/DL (ref 7–20)
CALCIUM SERPL-MCNC: 9.1 MG/DL (ref 8.3–10.6)
CHLORIDE SERPL-SCNC: 102 MMOL/L (ref 99–110)
CO2 SERPL-SCNC: 25 MMOL/L (ref 21–32)
CREAT SERPL-MCNC: 0.7 MG/DL (ref 0.6–1.2)
DEPRECATED RDW RBC AUTO: 13.4 % (ref 12.4–15.4)
EKG ATRIAL RATE: 65 BPM
EKG DIAGNOSIS: NORMAL
EKG P AXIS: 48 DEGREES
EKG P-R INTERVAL: 140 MS
EKG Q-T INTERVAL: 426 MS
EKG QRS DURATION: 88 MS
EKG QTC CALCULATION (BAZETT): 443 MS
EKG R AXIS: -6 DEGREES
EKG T AXIS: 19 DEGREES
EKG VENTRICULAR RATE: 65 BPM
EOSINOPHIL # BLD: 0.1 K/UL (ref 0–0.6)
EOSINOPHIL NFR BLD: 2.6 %
GFR SERPLBLD CREATININE-BSD FMLA CKD-EPI: >60 ML/MIN/{1.73_M2}
GLUCOSE SERPL-MCNC: 120 MG/DL (ref 70–99)
HCT VFR BLD AUTO: 41.7 % (ref 36–48)
HGB BLD-MCNC: 13.8 G/DL (ref 12–16)
LYMPHOCYTES # BLD: 0.7 K/UL (ref 1–5.1)
LYMPHOCYTES NFR BLD: 13.5 %
MCH RBC QN AUTO: 29.2 PG (ref 26–34)
MCHC RBC AUTO-ENTMCNC: 33.1 G/DL (ref 31–36)
MCV RBC AUTO: 88.3 FL (ref 80–100)
MONOCYTES # BLD: 0.2 K/UL (ref 0–1.3)
MONOCYTES NFR BLD: 4.9 %
NEUTROPHILS # BLD: 3.9 K/UL (ref 1.7–7.7)
NEUTROPHILS NFR BLD: 77 %
PLATELET # BLD AUTO: 208 K/UL (ref 135–450)
PMV BLD AUTO: 8.6 FL (ref 5–10.5)
POTASSIUM SERPL-SCNC: 3.9 MMOL/L (ref 3.5–5.1)
PROT SERPL-MCNC: 7.2 G/DL (ref 6.4–8.2)
RBC # BLD AUTO: 4.72 M/UL (ref 4–5.2)
SODIUM SERPL-SCNC: 137 MMOL/L (ref 136–145)
TROPONIN T SERPL-MCNC: <0.01 NG/ML
WBC # BLD AUTO: 5 K/UL (ref 4–11)

## 2023-03-22 PROCEDURE — G0378 HOSPITAL OBSERVATION PER HR: HCPCS

## 2023-03-22 PROCEDURE — 99285 EMERGENCY DEPT VISIT HI MDM: CPT

## 2023-03-22 PROCEDURE — 2580000003 HC RX 258: Performed by: INTERNAL MEDICINE

## 2023-03-22 PROCEDURE — 71045 X-RAY EXAM CHEST 1 VIEW: CPT

## 2023-03-22 PROCEDURE — 93005 ELECTROCARDIOGRAM TRACING: CPT | Performed by: STUDENT IN AN ORGANIZED HEALTH CARE EDUCATION/TRAINING PROGRAM

## 2023-03-22 PROCEDURE — 84484 ASSAY OF TROPONIN QUANT: CPT

## 2023-03-22 PROCEDURE — 85025 COMPLETE CBC W/AUTO DIFF WBC: CPT

## 2023-03-22 PROCEDURE — 6360000002 HC RX W HCPCS: Performed by: INTERNAL MEDICINE

## 2023-03-22 PROCEDURE — 93010 ELECTROCARDIOGRAM REPORT: CPT | Performed by: INTERNAL MEDICINE

## 2023-03-22 PROCEDURE — 96372 THER/PROPH/DIAG INJ SC/IM: CPT

## 2023-03-22 PROCEDURE — 6370000000 HC RX 637 (ALT 250 FOR IP): Performed by: PHYSICIAN ASSISTANT

## 2023-03-22 PROCEDURE — 80053 COMPREHEN METABOLIC PANEL: CPT

## 2023-03-22 PROCEDURE — 80061 LIPID PANEL: CPT

## 2023-03-22 RX ORDER — ASPIRIN 81 MG/1
81 TABLET, CHEWABLE ORAL DAILY
Status: DISCONTINUED | OUTPATIENT
Start: 2023-03-23 | End: 2023-03-23 | Stop reason: HOSPADM

## 2023-03-22 RX ORDER — ONDANSETRON 2 MG/ML
4 INJECTION INTRAMUSCULAR; INTRAVENOUS EVERY 6 HOURS PRN
Status: DISCONTINUED | OUTPATIENT
Start: 2023-03-22 | End: 2023-03-23 | Stop reason: HOSPADM

## 2023-03-22 RX ORDER — ASPIRIN 81 MG/1
324 TABLET, CHEWABLE ORAL ONCE
Status: COMPLETED | OUTPATIENT
Start: 2023-03-22 | End: 2023-03-22

## 2023-03-22 RX ORDER — ONDANSETRON 4 MG/1
4 TABLET, ORALLY DISINTEGRATING ORAL EVERY 8 HOURS PRN
Status: DISCONTINUED | OUTPATIENT
Start: 2023-03-22 | End: 2023-03-23 | Stop reason: HOSPADM

## 2023-03-22 RX ORDER — ACETAMINOPHEN 325 MG/1
650 TABLET ORAL EVERY 6 HOURS PRN
Status: DISCONTINUED | OUTPATIENT
Start: 2023-03-22 | End: 2023-03-23 | Stop reason: HOSPADM

## 2023-03-22 RX ORDER — ALPRAZOLAM 0.25 MG/1
0.25 TABLET ORAL 3 TIMES DAILY PRN
Status: DISCONTINUED | OUTPATIENT
Start: 2023-03-22 | End: 2023-03-23 | Stop reason: HOSPADM

## 2023-03-22 RX ORDER — POLYETHYLENE GLYCOL 3350 17 G/17G
17 POWDER, FOR SOLUTION ORAL DAILY PRN
Status: DISCONTINUED | OUTPATIENT
Start: 2023-03-22 | End: 2023-03-23 | Stop reason: HOSPADM

## 2023-03-22 RX ORDER — SODIUM CHLORIDE 9 MG/ML
INJECTION, SOLUTION INTRAVENOUS PRN
Status: DISCONTINUED | OUTPATIENT
Start: 2023-03-22 | End: 2023-03-23 | Stop reason: HOSPADM

## 2023-03-22 RX ORDER — ENOXAPARIN SODIUM 100 MG/ML
30 INJECTION SUBCUTANEOUS 2 TIMES DAILY
Status: DISCONTINUED | OUTPATIENT
Start: 2023-03-22 | End: 2023-03-23 | Stop reason: HOSPADM

## 2023-03-22 RX ORDER — SODIUM CHLORIDE 0.9 % (FLUSH) 0.9 %
5-40 SYRINGE (ML) INJECTION PRN
Status: DISCONTINUED | OUTPATIENT
Start: 2023-03-22 | End: 2023-03-23 | Stop reason: HOSPADM

## 2023-03-22 RX ORDER — SODIUM CHLORIDE 0.9 % (FLUSH) 0.9 %
5-40 SYRINGE (ML) INJECTION EVERY 12 HOURS SCHEDULED
Status: DISCONTINUED | OUTPATIENT
Start: 2023-03-22 | End: 2023-03-23 | Stop reason: HOSPADM

## 2023-03-22 RX ORDER — ACETAMINOPHEN 650 MG/1
650 SUPPOSITORY RECTAL EVERY 6 HOURS PRN
Status: DISCONTINUED | OUTPATIENT
Start: 2023-03-22 | End: 2023-03-23 | Stop reason: HOSPADM

## 2023-03-22 RX ADMIN — ENOXAPARIN SODIUM 30 MG: 100 INJECTION SUBCUTANEOUS at 16:40

## 2023-03-22 RX ADMIN — Medication 10 ML: at 21:01

## 2023-03-22 RX ADMIN — ASPIRIN 81 MG 324 MG: 81 TABLET ORAL at 09:49

## 2023-03-22 RX ADMIN — NITROGLYCERIN 0.5 INCH: 20 OINTMENT TOPICAL at 09:49

## 2023-03-22 RX ADMIN — ENOXAPARIN SODIUM 30 MG: 100 INJECTION SUBCUTANEOUS at 21:00

## 2023-03-22 ASSESSMENT — PAIN SCALES - GENERAL: PAINLEVEL_OUTOF10: 0

## 2023-03-22 ASSESSMENT — PAIN - FUNCTIONAL ASSESSMENT: PAIN_FUNCTIONAL_ASSESSMENT: NONE - DENIES PAIN

## 2023-03-22 NOTE — PROGRESS NOTES
Admitted to room 364 family at bedside. Tele box # 44 in place, pt aware of placement and reason. Oriented to room, bed rails, call light and bathroom.  Pt aware of plan of day and NPO @ MN for stress test.

## 2023-03-22 NOTE — ED PROVIDER NOTES
[address]  Emergency Department Encounter    Patient Name: Amrit Jackson  MRN: [id number]  YOB: 1960  Date of Evaluation: 3/22/2023  Provider: IBAN Ragsdale CNP  Note Started: 9:26 AM EDT 3/22/23    CHIEF COMPLAINT  Chest Pain (Pt describes as sharp pain and heaviness in chest and some tingling and pain in neck and back)    SHARED SERVICE VISIT  I have seen and evaluated this patient with my supervising physician, Dr. Sri Bar. HISTORY OF PRESENT ILLNESS  Amrit Jackson is a 57 y.o. female who presents to the ED for evaluation of 1 day history of chest discomfort. Self in today for evaluation. Patient states that upon waking up this morning felt some pressure in the left chest radiating into the neck and jaw. Fairly constant. Does not feel short of breath. Has had no cough or congestion. Denies fevers chills. Mild lightheadedness without dizziness or confusion. She denies abdominal pain. No nausea, vomiting or diarrhea. Pain also does appear to radiate into the back mildly. She reports her discomfort at a 2 out of 10. States that she \"just does not feel well\". Denies urinary symptoms. No leg pain or swelling. No other complaints, modifying factors or associated symptoms. Nursing notes reviewed were all reviewed and agreed with or any disagreements were addressed in the HPI.     PMH:  Past Medical History:   Diagnosis Date    Abdominal pain     Anxiety     Cancer ([facility] [address]     ovarian    Diverticulitis large intestine w/o perforation or abscess w/o bleeding 05/19/2018    GERD (gastroesophageal reflux disease)     Hypercholesteremia     was on meds then weight loss    Hypertension     benigh essential - was on meds in past lost 50 lbs    Menorrhagia     Migraine     Ovarian cancer ([facility] [address]     Panic attacks      Surgical History:  Past Surgical History:   Procedure Laterality Date    ABDOMINAL EXPLORATION SURGERY  06/25/2018    trachelectomy, plvic and pasquale-aortic

## 2023-03-22 NOTE — H&P
motion. No jugular venous distention. Trachea midline. Respiratory:  Normal respiratory effort. Clear to auscultation, bilaterally without Rales/Wheezes/Rhonchi. Cardiovascular:  Regular rate and rhythm with normal S1/S2 without murmurs, rubs or gallops. Abdomen: Soft, non-tender, non-distended with normal bowel sounds. Musculoskeletal:  No clubbing, cyanosis or edema bilaterally. Full range of motion without deformity. Skin: Skin color, texture, turgor normal.  No rashes or lesions. Neurologic:  Neurovascularly intact without any focal sensory/motor deficits. Cranial nerves: II-XII intact, grossly non-focal.  Psychiatric:  Alert and oriented, thought content appropriate, normal insight  Capillary Refill: Brisk,3 seconds, normal  Peripheral Pulses: +2 palpable, equal bilaterally       Labs:     Recent Labs     03/22/23  0930   WBC 5.0   HGB 13.8   HCT 41.7        Recent Labs     03/22/23  0930      K 3.9      CO2 25   BUN 17   CREATININE 0.7   CALCIUM 9.1     Recent Labs     03/22/23  0930   AST 17   ALT 18   BILITOT 0.3   ALKPHOS 77     No results for input(s): INR in the last 72 hours. Recent Labs     03/22/23  0930   TROPONINI <0.01       Urinalysis:      Lab Results   Component Value Date/Time    NITRU Negative 08/04/2019 08:48 AM    WBCUA 3-5 08/04/2019 08:48 AM    BACTERIA Rare 08/04/2019 08:48 AM    RBCUA 0-2 08/04/2019 08:48 AM    BLOODU Negative 08/04/2019 08:48 AM    SPECGRAV <=1.005 08/04/2019 08:48 AM    GLUCOSEU Negative 08/04/2019 08:48 AM       Radiology:     CXR: I have reviewed the CXR with the following interpretation: No acute abnormalities  EKG:  I have reviewed the EKG with the following interpretation: Normal sinus rhythm, no acute ischemia. XR CHEST PORTABLE   Final Result   1. No acute abnormality.              Consults:    IP CONSULT TO CARDIOLOGY    ASSESSMENT:    Active Hospital Problems    Diagnosis Date Noted    Chest pain [R07.9] 03/22/2023

## 2023-03-23 ENCOUNTER — APPOINTMENT (OUTPATIENT)
Dept: NUCLEAR MEDICINE | Age: 63
End: 2023-03-23
Payer: COMMERCIAL

## 2023-03-23 VITALS
TEMPERATURE: 97.9 F | SYSTOLIC BLOOD PRESSURE: 152 MMHG | OXYGEN SATURATION: 96 % | DIASTOLIC BLOOD PRESSURE: 90 MMHG | WEIGHT: 229 LBS | BODY MASS INDEX: 38.15 KG/M2 | HEART RATE: 56 BPM | RESPIRATION RATE: 17 BRPM | HEIGHT: 65 IN

## 2023-03-23 LAB
ALBUMIN SERPL-MCNC: 3.7 G/DL (ref 3.4–5)
ALBUMIN/GLOB SERPL: 1.2 {RATIO} (ref 1.1–2.2)
ALP SERPL-CCNC: 67 U/L (ref 40–129)
ALT SERPL-CCNC: 15 U/L (ref 10–40)
ANION GAP SERPL CALCULATED.3IONS-SCNC: 7 MMOL/L (ref 3–16)
AST SERPL-CCNC: 15 U/L (ref 15–37)
BILIRUB SERPL-MCNC: 0.3 MG/DL (ref 0–1)
BUN SERPL-MCNC: 16 MG/DL (ref 7–20)
CALCIUM SERPL-MCNC: 9 MG/DL (ref 8.3–10.6)
CHLORIDE SERPL-SCNC: 103 MMOL/L (ref 99–110)
CHOLEST SERPL-MCNC: 269 MG/DL (ref 0–199)
CO2 SERPL-SCNC: 28 MMOL/L (ref 21–32)
CREAT SERPL-MCNC: 0.7 MG/DL (ref 0.6–1.2)
DEPRECATED RDW RBC AUTO: 13.7 % (ref 12.4–15.4)
EKG ATRIAL RATE: 52 BPM
EKG DIAGNOSIS: NORMAL
EKG P AXIS: 62 DEGREES
EKG P-R INTERVAL: 148 MS
EKG Q-T INTERVAL: 434 MS
EKG QRS DURATION: 92 MS
EKG QTC CALCULATION (BAZETT): 403 MS
EKG R AXIS: 83 DEGREES
EKG T AXIS: 31 DEGREES
EKG VENTRICULAR RATE: 52 BPM
GFR SERPLBLD CREATININE-BSD FMLA CKD-EPI: >60 ML/MIN/{1.73_M2}
GLUCOSE SERPL-MCNC: 92 MG/DL (ref 70–99)
HCT VFR BLD AUTO: 39.4 % (ref 36–48)
HDLC SERPL-MCNC: 49 MG/DL (ref 40–60)
HGB BLD-MCNC: 13 G/DL (ref 12–16)
LDLC SERPL CALC-MCNC: 196 MG/DL
LV EF: 55 %
LV EF: 69 %
LVEF MODALITY: NORMAL
LVEF MODALITY: NORMAL
MCH RBC QN AUTO: 29.1 PG (ref 26–34)
MCHC RBC AUTO-ENTMCNC: 33.1 G/DL (ref 31–36)
MCV RBC AUTO: 88.1 FL (ref 80–100)
PLATELET # BLD AUTO: 189 K/UL (ref 135–450)
PMV BLD AUTO: 8 FL (ref 5–10.5)
POTASSIUM SERPL-SCNC: 4.2 MMOL/L (ref 3.5–5.1)
PROT SERPL-MCNC: 6.7 G/DL (ref 6.4–8.2)
RBC # BLD AUTO: 4.47 M/UL (ref 4–5.2)
SODIUM SERPL-SCNC: 138 MMOL/L (ref 136–145)
TRIGL SERPL-MCNC: 118 MG/DL (ref 0–150)
TSH SERPL DL<=0.005 MIU/L-ACNC: 1.61 UIU/ML (ref 0.27–4.2)
VLDLC SERPL CALC-MCNC: 24 MG/DL
WBC # BLD AUTO: 5.4 K/UL (ref 4–11)

## 2023-03-23 PROCEDURE — 93017 CV STRESS TEST TRACING ONLY: CPT

## 2023-03-23 PROCEDURE — 6360000002 HC RX W HCPCS: Performed by: INTERNAL MEDICINE

## 2023-03-23 PROCEDURE — G0378 HOSPITAL OBSERVATION PER HR: HCPCS

## 2023-03-23 PROCEDURE — 2580000003 HC RX 258: Performed by: INTERNAL MEDICINE

## 2023-03-23 PROCEDURE — 80053 COMPREHEN METABOLIC PANEL: CPT

## 2023-03-23 PROCEDURE — 78452 HT MUSCLE IMAGE SPECT MULT: CPT

## 2023-03-23 PROCEDURE — 84443 ASSAY THYROID STIM HORMONE: CPT

## 2023-03-23 PROCEDURE — 93306 TTE W/DOPPLER COMPLETE: CPT

## 2023-03-23 PROCEDURE — 85027 COMPLETE CBC AUTOMATED: CPT

## 2023-03-23 PROCEDURE — 3430000000 HC RX DIAGNOSTIC RADIOPHARMACEUTICAL: Performed by: INTERNAL MEDICINE

## 2023-03-23 PROCEDURE — 6370000000 HC RX 637 (ALT 250 FOR IP): Performed by: INTERNAL MEDICINE

## 2023-03-23 PROCEDURE — 93005 ELECTROCARDIOGRAM TRACING: CPT | Performed by: INTERNAL MEDICINE

## 2023-03-23 PROCEDURE — A9502 TC99M TETROFOSMIN: HCPCS | Performed by: INTERNAL MEDICINE

## 2023-03-23 PROCEDURE — 96372 THER/PROPH/DIAG INJ SC/IM: CPT

## 2023-03-23 RX ORDER — ROSUVASTATIN CALCIUM 10 MG/1
20 TABLET, COATED ORAL DAILY
Status: DISCONTINUED | OUTPATIENT
Start: 2023-03-23 | End: 2023-03-23 | Stop reason: HOSPADM

## 2023-03-23 RX ORDER — AMLODIPINE BESYLATE 5 MG/1
5 TABLET ORAL DAILY
Qty: 30 TABLET | Refills: 3 | Status: SHIPPED | OUTPATIENT
Start: 2023-03-24

## 2023-03-23 RX ORDER — ASPIRIN 81 MG/1
81 TABLET, CHEWABLE ORAL DAILY
Qty: 30 TABLET | Refills: 3 | Status: SHIPPED | OUTPATIENT
Start: 2023-03-24

## 2023-03-23 RX ORDER — AMLODIPINE BESYLATE 5 MG/1
5 TABLET ORAL DAILY
Status: DISCONTINUED | OUTPATIENT
Start: 2023-03-23 | End: 2023-03-23 | Stop reason: HOSPADM

## 2023-03-23 RX ORDER — ROSUVASTATIN CALCIUM 20 MG/1
20 TABLET, COATED ORAL DAILY
Qty: 30 TABLET | Refills: 3 | Status: SHIPPED | OUTPATIENT
Start: 2023-03-24

## 2023-03-23 RX ADMIN — TETROFOSMIN 11.8 MILLICURIE: 1.38 INJECTION, POWDER, LYOPHILIZED, FOR SOLUTION INTRAVENOUS at 08:25

## 2023-03-23 RX ADMIN — ASPIRIN 81 MG 81 MG: 81 TABLET ORAL at 12:04

## 2023-03-23 RX ADMIN — ROSUVASTATIN CALCIUM 20 MG: 10 TABLET, FILM COATED ORAL at 14:36

## 2023-03-23 RX ADMIN — ENOXAPARIN SODIUM 30 MG: 100 INJECTION SUBCUTANEOUS at 12:04

## 2023-03-23 RX ADMIN — TETROFOSMIN 32 MILLICURIE: 1.38 INJECTION, POWDER, LYOPHILIZED, FOR SOLUTION INTRAVENOUS at 09:50

## 2023-03-23 RX ADMIN — AMLODIPINE BESYLATE 5 MG: 5 TABLET ORAL at 14:35

## 2023-03-23 RX ADMIN — Medication 10 ML: at 12:04

## 2023-03-23 NOTE — CARE COORDINATION
Case Management Assessment  Initial Evaluation    Date/Time of Evaluation: 3/23/2023 3:24 PM  Assessment Completed by: Koen Durham RN    If patient is discharged prior to next notation, then this note serves as note for discharge by case management. Patient Name: Larisa Wen                   YOB: 1960  Diagnosis: Chest pain [R07.9]  Chest pain, unspecified type [R07.9]                   Date / Time: 3/22/2023  9:14 AM    Patient Admission Status: Observation   Readmission Risk (Low < 19, Mod (19-27), High > 27): No data recorded  Current PCP: IBAN Sellers CNP  PCP verified by CM? Yes    Chart Reviewed: Yes      History Provided by: Patient  Patient Orientation: Alert and Oriented, Person, Place, Situation, Self    Patient Cognition: Alert    Hospitalization in the last 30 days (Readmission):  No    If yes, Readmission Assessment in  Navigator will be completed. Advance Directives:      Code Status: Full Code   Patient's Primary Decision Maker is: Legal Next of Kin      Discharge Planning:    Patient lives with: Spouse/Significant Other Type of Home: House  Primary Care Giver: Self  Patient Support Systems include: Spouse/Significant Other   Current Financial resources: Other (Comment) (commercial insurance)  Current community resources: Housing  Current services prior to admission: None            Current DME:              Type of Home Care services:  None    ADLS  Prior functional level: Independent in ADLs/IADLs  Current functional level: Independent in ADLs/IADLs    PT AM-PAC:   /24  OT AM-PAC:   /24    Family can provide assistance at DC: Yes  Would you like Case Management to discuss the discharge plan with any other family members/significant others, and if so, who?  No  Plans to Return to Present Housing: Yes  Other Identified Issues/Barriers to RETURNING to current housing: none  Potential Assistance needed at discharge: N/A            Potential DME:    Patient expects to

## 2023-03-23 NOTE — PROGRESS NOTES
4 Eyes Skin Assessment     NAME:  Shanique Reed  YOB: 1960  MEDICAL RECORD NUMBER:  [id number]    The patient is being assessed for  Admission    I agree that One RN has performed a thorough Head to Toe Skin Assessment on the patient. ALL assessment sites listed below have been assessed. Areas assessed by both nurses:    Head, Face, Ears, Shoulders, Back, Chest, Arms, Elbows, Hands, Sacrum. Buttock, Coccyx, Ischium, and Legs. Feet and Heels        Does the Patient have a Wound?  No noted wound(s)       Kash Prevention initiated by RN: No   Wound Care Orders initiated by RN: No    Pressure Injury (Stage 3,4, Unstageable, DTI, NWPT, and Complex wounds) if present, place referral order by RN under : No    New and Established Ostomies, if present place, referral order under : No      Nurse 1 eSignature: Electronically signed by Tashia Stubbs RN on 3/23/23 at 1:34 AM EDT    **SHARE this note so that the co-signing nurse can place an eSignature**    Nurse 2 eSignature: Electronically signed by Anabel Grullon RN on 3/23/23 at 6:22 AM EDT

## 2023-03-23 NOTE — CONSULTS
Consult placed    Who:CARDIOLOGY, [facility]  Date:3/22/2023,  Time:4:00 PM        Electronically signed by Chary Chowdary on 3/22/2023 at 4:00 PM
wall motion. Overall findings represent a lower risk abnormal study. Exercise stress EKG is negative for evidence of ischemia. Olivares treadmill score = +5. Patient became markedly hypertensive during exercise with peak BP of 267/102. Cath: N/A    Other Studies:   Chest X-ray 2/22/23:  1. No acute abnormality. Assessment and Plan      1. Atypical chest pain/abnormal nuclear SPECT stress test - Overall, patient's chest pain is very atypical and given that her recent stress test is felt to be lower risk with a small area of apical ischemia, would recommend initial treatment with guideline-directed medical therapy. If she has additional symptoms concerning for possible angina in the future despite medical therapy, can re-evaluate risks/benefits invasive coronary angiography for more definitive assessment of her coronary anatomy at that time. -Recommend starting aspirin 81 mg daily and rosuvastatin 20 mg daily (she was previously intolerant to atorvastatin and if ultimately unable to tolerate other statins, can attempt to get approved for PCSK9-inhibitor)  -Start amlodipine 5 mg daily and titrate as needed to achieve goal BP <130/80  -Hold off on beta-blocker given baseline bradycardia  -Can provide with prescription for SL nitroglycerin at discharge  -TTE ordered for complete assessment of cardiac structure and function - If no additional concerning findings seen on echo, OK for discharge from cardiology standpoint and will arrange for follow-up with [facility] in one month  -If she develops symptoms concerning for angina in the future while on guideline directed medical therapy, can re-evaluate risk/benefits of invasive coronary angiography at that time    2.  Essential hypertension - BP consistently elevated throughout admission and became markedly elevated while exercising on treadmill during stress test.  -Can start amlodipine 5 mg daily as above  -Instructed patient to start checking her BP 2-3

## 2023-03-23 NOTE — DISCHARGE INSTRUCTIONS
FOLLOW-UP APPOINTMENTS    [facility] OFFICE - Appointment on 05/10/2023 at [email or web address] [facility] with Dr. Nelson Torres, [facility]. [facility],  [facility], [address] [place] [address] [address], [address] [facility], [address] [place] [address]. Office #: [phone]. If you are unable to make this appointment, please call to reschedule. Directions to [place] [address] towards [place]. [address] [facility] exit. Right off exit. Cross over [employer]. Right on [address] Left into hospital. Follow the signs to the emergency room ( turn left toward the Emergency room). Go right at the first stop sign. Just past the Emergency room at the second stop sign turn right and go up the ramp and park on the top level if possible. Go in the glass doors of the [facility] we on the top level of the garage [address]. As soon as you get in the door turn left and our office is the one with the glass doors.

## 2023-03-23 NOTE — PLAN OF CARE
Problem: Pain  Goal: Verbalizes/displays adequate comfort level or baseline comfort level  Outcome: Progressing  Flowsheets (Taken 3/23/2023 0002)  Verbalizes/displays adequate comfort level or baseline comfort level:   Encourage patient to monitor pain and request assistance   Assess pain using appropriate pain scale  Note: Patient has remained free of pain this shift and is resting comfortably.

## 2023-03-23 NOTE — DISCHARGE SUMMARY
non-distended with normal bowel sounds. Musculoskeletal:  No clubbing, cyanosis or edema bilaterally. Full range of motion without deformity. Skin: Skin color, texture, turgor normal.  No rashes or lesions. Neurologic:  Neurovascularly intact without any focal sensory/motor deficits. Cranial nerves: II-XII intact, grossly non-focal.  Psychiatric:  Alert and oriented, thought content appropriate, normal insight  Capillary Refill: Brisk,< 3 seconds   Peripheral Pulses: +2 palpable, equal bilaterally       Labs: For convenience and continuity at follow-up the following most recent labs are provided:      CBC:    Lab Results   Component Value Date/Time    WBC 5.4 03/23/2023 05:45 AM    HGB 13.0 03/23/2023 05:45 AM    HCT 39.4 03/23/2023 05:45 AM     03/23/2023 05:45 AM       Renal:    Lab Results   Component Value Date/Time     03/23/2023 05:45 AM    K 4.2 03/23/2023 05:45 AM     03/23/2023 05:45 AM    CO2 28 03/23/2023 05:45 AM    BUN 16 03/23/2023 05:45 AM    CREATININE 0.7 03/23/2023 05:45 AM    CALCIUM 9.0 03/23/2023 05:45 AM    PHOS 3.5 05/14/2018 04:32 PM         Significant Diagnostic Studies    Radiology:   NM Cardiac Stress Test Nuclear Imaging   Final Result      XR CHEST PORTABLE   Final Result   1. No acute abnormality.                 Consults:     IP CONSULT TO CARDIOLOGY    Disposition:  Home     Condition at Discharge: Stable    Discharge Instructions/Follow-up:  cardiology as outpatient    Code Status:  Full Code     Activity: activity as tolerated    Diet: regular diet      Discharge Medications:     Current Discharge Medication List             Details   amLODIPine (NORVASC) 5 MG tablet Take 1 tablet by mouth daily  Qty: 30 tablet, Refills: 3      aspirin 81 MG chewable tablet Take 1 tablet by mouth daily  Qty: 30 tablet, Refills: 3      rosuvastatin (CRESTOR) 20 MG tablet Take 1 tablet by mouth daily  Qty: 30 tablet, Refills: 3             Time Spent on discharge: 43 minutes in

## 2023-03-24 ENCOUNTER — CARE COORDINATION (OUTPATIENT)
Dept: CASE MANAGEMENT | Age: 63
End: 2023-03-24

## 2023-03-24 NOTE — CARE COORDINATION
[address] Care Transitions Initial Follow Up Call    Call within 2 business days of discharge: Yes      Patient: Melany Red Patient : 1960   MRN: [id number]  Reason for Admission: CP  Discharge Date: 3/23/23 RARS: No data recorded    Last Discharge  [address]       Date Complaint Diagnosis Description Type Department Provider    3/22/23 Chest Pain Chest pain, unspecified type ED to Hosp-Admission (Discharged) (ADMITTED) [place] [facility] Myah Jackson MD; Cheyenne Cox. .. Attempted to reach patient via phone for initial post hospital transition call. VM left stating purpose of call along with my contact information requesting a return call.             Care Transitions 24 Hour Call    Patient DME: Other  Other Patient DME: IS  Do you have support at home?: Partner/Spouse/SO  Are you an active caregiver in your home?: No  Care Transitions Interventions     Other Services: Declined (Comment: home care )              Follow Up  Future Appointments   Date Time Provider Kady Dupont   2023  8:30 AM IBAN Pandey - CNP [facility] [place] [facility]   5/10/2023 11:00 AM Rama Hurtado DO [employer] YARELIS MENCHACAN, RN, [facility] Transition Nurse  [phone] mobile

## 2023-03-27 ENCOUNTER — TELEPHONE (OUTPATIENT)
Dept: INTERNAL MEDICINE CLINIC | Age: 63
End: 2023-03-27

## 2023-03-27 ENCOUNTER — CARE COORDINATION (OUTPATIENT)
Dept: CASE MANAGEMENT | Age: 63
End: 2023-03-27

## 2023-03-27 DIAGNOSIS — R07.9 CHEST PAIN, UNSPECIFIED TYPE: Primary | ICD-10-CM

## 2023-03-27 NOTE — CARE COORDINATION
Decision Maker Relationship (ie \"Primary\"). Today we discussed Healthcare Decision Makers. The patient is considering options. Patients top risk factors for readmission: medical condition-. Interventions to address risk factors: Obtained and reviewed discharge summary and/or continuity of care documents and Education of patient/family/caregiver/guardian to support self-management-. Offered patient enrollment in the Remote Patient Monitoring (RPM) program for in-home monitoring: Patient is not eligible for RPM program.     Care Transitions Subsequent and Final Call    Schedule Follow Up Appointment with PCP: Declined  Subsequent and Final Calls  Care Transitions Interventions     Other Services: Declined (Comment: home care)   Other Interventions:             Care Transition Nurse provided contact information for future needs. Plan for follow-up call in 5-7 days based on severity of symptoms and risk factors.   Plan for next call: self management-.    Alix TRACY, RN, [facility]  Care Transition Nurse  [phone] mobile

## 2023-03-27 NOTE — TELEPHONE ENCOUNTER
Called and Left message for patient to call back and to do a TCM Call and to schedule an appointment for a Hospital Follow Up.

## 2023-04-03 ENCOUNTER — CARE COORDINATION (OUTPATIENT)
Dept: CASE MANAGEMENT | Age: 63
End: 2023-04-03

## 2023-04-03 NOTE — CARE COORDINATION
[facility] Care Transitions Follow Up Call        Patient: Barak Tariq  Patient : 1960   MRN: [id number]  Reason for Admission: CP OBS   Discharge Date: 3/23/23 RARS: No data recorded        Attempted to reach patient via phone for care transition. VM left stating purpose of call along with my contact information requesting a return call.             Follow Up  Future Appointments   Date Time Provider Kady Dupont   2023  8:30 AM IBAN Orozco CNP [facility]   5/10/2023 11:00 AM DO Mis Escalona          Care Transitions Subsequent and Final Call    Subsequent and Final Calls  Care Transitions Interventions     Other Services: Declined (Comment: home care)   Other Interventions:             Elana TRACY, RN, [facility]  Care Transition Nurse  [phone] mobile

## 2023-04-14 ENCOUNTER — TELEPHONE (OUTPATIENT)
Dept: INTERNAL MEDICINE CLINIC | Age: 63
End: 2023-04-14

## 2023-04-14 ENCOUNTER — OFFICE VISIT (OUTPATIENT)
Dept: INTERNAL MEDICINE CLINIC | Age: 63
End: 2023-04-14

## 2023-04-14 VITALS
DIASTOLIC BLOOD PRESSURE: 66 MMHG | HEART RATE: 69 BPM | TEMPERATURE: 97.9 F | OXYGEN SATURATION: 95 % | BODY MASS INDEX: 38.82 KG/M2 | HEIGHT: 65 IN | SYSTOLIC BLOOD PRESSURE: 132 MMHG | WEIGHT: 233 LBS

## 2023-04-14 DIAGNOSIS — G43.109 MIGRAINE WITH AURA AND WITHOUT STATUS MIGRAINOSUS, NOT INTRACTABLE: ICD-10-CM

## 2023-04-14 DIAGNOSIS — Z00.00 ROUTINE GENERAL MEDICAL EXAMINATION AT A HEALTH CARE FACILITY: Primary | ICD-10-CM

## 2023-04-14 DIAGNOSIS — F41.9 ANXIETY: ICD-10-CM

## 2023-04-14 DIAGNOSIS — Z85.43 HISTORY OF OVARIAN CANCER: ICD-10-CM

## 2023-04-14 DIAGNOSIS — I10 ESSENTIAL HYPERTENSION: ICD-10-CM

## 2023-04-14 DIAGNOSIS — R40.0 DAYTIME SLEEPINESS: ICD-10-CM

## 2023-04-14 DIAGNOSIS — E78.5 DYSLIPIDEMIA: ICD-10-CM

## 2023-04-14 DIAGNOSIS — E66.01 SEVERE OBESITY (BMI 35.0-39.9) WITH COMORBIDITY (HCC): ICD-10-CM

## 2023-04-14 RX ORDER — ALPRAZOLAM 0.25 MG/1
0.25 TABLET ORAL NIGHTLY PRN
Qty: 30 TABLET | Refills: 0 | Status: SHIPPED | OUTPATIENT
Start: 2023-04-14 | End: 2023-05-14

## 2023-04-14 SDOH — ECONOMIC STABILITY: FOOD INSECURITY: WITHIN THE PAST 12 MONTHS, THE FOOD YOU BOUGHT JUST DIDN'T LAST AND YOU DIDN'T HAVE MONEY TO GET MORE.: NEVER TRUE

## 2023-04-14 SDOH — ECONOMIC STABILITY: FOOD INSECURITY: WITHIN THE PAST 12 MONTHS, YOU WORRIED THAT YOUR FOOD WOULD RUN OUT BEFORE YOU GOT MONEY TO BUY MORE.: NEVER TRUE

## 2023-04-14 SDOH — ECONOMIC STABILITY: INCOME INSECURITY: HOW HARD IS IT FOR YOU TO PAY FOR THE VERY BASICS LIKE FOOD, HOUSING, MEDICAL CARE, AND HEATING?: NOT HARD AT ALL

## 2023-04-14 SDOH — ECONOMIC STABILITY: HOUSING INSECURITY
IN THE LAST 12 MONTHS, WAS THERE A TIME WHEN YOU DID NOT HAVE A STEADY PLACE TO SLEEP OR SLEPT IN A SHELTER (INCLUDING NOW)?: NO

## 2023-04-14 ASSESSMENT — PATIENT HEALTH QUESTIONNAIRE - PHQ9
1. LITTLE INTEREST OR PLEASURE IN DOING THINGS: 0
SUM OF ALL RESPONSES TO PHQ QUESTIONS 1-9: 0
SUM OF ALL RESPONSES TO PHQ9 QUESTIONS 1 & 2: 0
SUM OF ALL RESPONSES TO PHQ QUESTIONS 1-9: 0
SUM OF ALL RESPONSES TO PHQ QUESTIONS 1-9: 0
2. FEELING DOWN, DEPRESSED OR HOPELESS: 0
SUM OF ALL RESPONSES TO PHQ QUESTIONS 1-9: 0

## 2023-04-14 ASSESSMENT — ENCOUNTER SYMPTOMS
NAUSEA: 0
COUGH: 0
SORE THROAT: 0
VOMITING: 0
DIARRHEA: 0
SINUS PRESSURE: 0
FACIAL SWELLING: 0

## 2023-05-01 ENCOUNTER — TELEPHONE (OUTPATIENT)
Dept: INTERNAL MEDICINE CLINIC | Age: 63
End: 2023-05-01

## 2023-05-22 ENCOUNTER — OFFICE VISIT (OUTPATIENT)
Dept: CARDIOLOGY CLINIC | Age: 63
End: 2023-05-22
Payer: COMMERCIAL

## 2023-05-22 VITALS
DIASTOLIC BLOOD PRESSURE: 72 MMHG | OXYGEN SATURATION: 95 % | SYSTOLIC BLOOD PRESSURE: 134 MMHG | BODY MASS INDEX: 38.65 KG/M2 | WEIGHT: 232 LBS | HEART RATE: 62 BPM | HEIGHT: 65 IN

## 2023-05-22 DIAGNOSIS — R07.89 ATYPICAL CHEST PAIN: ICD-10-CM

## 2023-05-22 DIAGNOSIS — I97.89 POSTOPERATIVE ATRIAL FIBRILLATION (HCC): ICD-10-CM

## 2023-05-22 DIAGNOSIS — R94.39 ABNORMAL STRESS TEST: Primary | ICD-10-CM

## 2023-05-22 DIAGNOSIS — E78.5 HYPERLIPIDEMIA, UNSPECIFIED HYPERLIPIDEMIA TYPE: ICD-10-CM

## 2023-05-22 DIAGNOSIS — I48.91 POSTOPERATIVE ATRIAL FIBRILLATION (HCC): ICD-10-CM

## 2023-05-22 DIAGNOSIS — I10 ESSENTIAL HYPERTENSION: ICD-10-CM

## 2023-05-22 PROCEDURE — 3075F SYST BP GE 130 - 139MM HG: CPT | Performed by: INTERNAL MEDICINE

## 2023-05-22 PROCEDURE — 99214 OFFICE O/P EST MOD 30 MIN: CPT | Performed by: INTERNAL MEDICINE

## 2023-05-22 PROCEDURE — 3078F DIAST BP <80 MM HG: CPT | Performed by: INTERNAL MEDICINE

## 2023-05-22 ASSESSMENT — ENCOUNTER SYMPTOMS
VOMITING: 0
SHORTNESS OF BREATH: 0
ABDOMINAL PAIN: 0
RHINORRHEA: 0
DIARRHEA: 0
COUGH: 0
CONSTIPATION: 0
SORE THROAT: 0
PHOTOPHOBIA: 0
BLOOD IN STOOL: 0
NAUSEA: 0
EYE PAIN: 0

## 2023-05-22 NOTE — PATIENT INSTRUCTIONS
Continue aspirin 81 mg daily, rosuvastatin 20 mg daily and amlodipine 5 mg daily. Continue to encourage heart healthy, low sodium diet and regular physical exercise for at least 30 minutes a minimum of 3-5 times per week. Follow-up with me in 1 year. Call office in December to make follow up appt.

## 2023-06-05 ENCOUNTER — TELEPHONE (OUTPATIENT)
Dept: INTERNAL MEDICINE CLINIC | Age: 63
End: 2023-06-05

## 2023-06-05 NOTE — TELEPHONE ENCOUNTER
Please tell her that these symptoms could be s/t medications. I believe Dr Disha Argueta is managing these two medications so I'd like her to call their office to see what he wants to do about potential symptoms. If he wants me to manage, that is ok too!

## 2023-06-05 NOTE — TELEPHONE ENCOUNTER
I have reviewed the provider's instructions with the patient, answering all questions to her satisfaction. She will let us know if Dr. Liss Valdez does not want to manage.

## 2023-06-05 NOTE — TELEPHONE ENCOUNTER
Patient called with some questions about her BP and Cholesterol medications. Patient was given these medications while in the hospital 2 months ago. Who prescribes the refills? And does she need refills? Cholesterol medication - her legs are starting to ache and she wants to know if this is from her choleresterol medication. Her feet are swelling at night. Is this a result of one of her medications?   Patient can be reached at [phone]

## 2023-07-03 ENCOUNTER — HOSPITAL ENCOUNTER (OUTPATIENT)
Age: 63
Discharge: HOME | End: 2023-07-03
Payer: COMMERCIAL

## 2023-07-03 DIAGNOSIS — Z79.899 MEDICATION MANAGEMENT: ICD-10-CM

## 2023-07-03 LAB
ANION GAP SERPL CALCULATED.3IONS-SCNC: 11 MMOL/L (ref 3–16)
BUN SERPL-MCNC: 17 MG/DL (ref 7–20)
CALCIUM SERPL-MCNC: 9.3 MG/DL (ref 8.3–10.6)
CHLORIDE SERPL-SCNC: 103 MMOL/L (ref 99–110)
CO2 SERPL-SCNC: 27 MMOL/L (ref 21–32)
CREAT SERPL-MCNC: 0.9 MG/DL (ref 0.6–1.2)
GFR SERPLBLD CREATININE-BSD FMLA CKD-EPI: >60 ML/MIN/{1.73_M2}
GLUCOSE SERPL-MCNC: 101 MG/DL (ref 70–99)
MAGNESIUM SERPL-MCNC: 1.9 MG/DL (ref 1.8–2.4)
POTASSIUM SERPL-SCNC: 4 MMOL/L (ref 3.5–5.1)
SODIUM SERPL-SCNC: 141 MMOL/L (ref 136–145)

## 2023-07-03 PROCEDURE — 36415 COLL VENOUS BLD VENIPUNCTURE: CPT

## 2023-07-03 PROCEDURE — 80048 BASIC METABOLIC PNL TOTAL CA: CPT

## 2023-07-03 PROCEDURE — 83735 ASSAY OF MAGNESIUM: CPT

## 2023-07-05 ENCOUNTER — TELEPHONE (OUTPATIENT)
Dept: CARDIOLOGY CLINIC | Age: 63
End: 2023-07-05

## 2023-07-05 NOTE — TELEPHONE ENCOUNTER
----- Message from Crystal Vazquez DO sent at 7/3/2023  6:13 PM EDT -----  Please let patient know that creatinine and electrolytes are WNL.

## 2023-12-07 RX ORDER — HYDROCHLOROTHIAZIDE 12.5 MG/1
12.5 CAPSULE, GELATIN COATED ORAL EVERY MORNING
Qty: 90 CAPSULE | Refills: 1 | Status: SHIPPED | OUTPATIENT
Start: 2023-12-07

## 2023-12-07 NOTE — TELEPHONE ENCOUNTER
Medication Refill    Medication needing refilled:Hydrochlorothiazide    Dosage of the medication:12.5mg    30 or 90 day supply called in:90    When will you run out of your medication:1 week    Which Pharmacy are we sending the medication to?:    [address] [place], [place] - [address] [phone] [facility] [phone]  Monique Gabby, [address]  Phone: [phone]  Fax: [phone]         Last OV: 05/22/23 vanesa

## 2023-12-07 NOTE — TELEPHONE ENCOUNTER
5/22/2023 [facility]  5/6/2024 [facility] upcoming appt  7/3/2023 [facility]

## 2024-02-27 ENCOUNTER — OFFICE VISIT (OUTPATIENT)
Dept: INTERNAL MEDICINE CLINIC | Age: 64
End: 2024-02-27
Payer: MEDICARE

## 2024-02-27 VITALS
DIASTOLIC BLOOD PRESSURE: 72 MMHG | OXYGEN SATURATION: 96 % | BODY MASS INDEX: 36.11 KG/M2 | HEART RATE: 67 BPM | SYSTOLIC BLOOD PRESSURE: 120 MMHG | TEMPERATURE: 97 F | WEIGHT: 217 LBS

## 2024-02-27 DIAGNOSIS — L84 CALLUS: ICD-10-CM

## 2024-02-27 DIAGNOSIS — M79.672 PAIN OF LEFT MIDFOOT: Primary | ICD-10-CM

## 2024-02-27 PROBLEM — I97.89 POSTOPERATIVE ATRIAL FIBRILLATION (HCC): Status: ACTIVE | Noted: 2024-02-27

## 2024-02-27 PROBLEM — I48.91 POSTOPERATIVE ATRIAL FIBRILLATION (HCC): Status: ACTIVE | Noted: 2024-02-27

## 2024-02-27 PROCEDURE — 1090F PRES/ABSN URINE INCON ASSESS: CPT | Performed by: STUDENT IN AN ORGANIZED HEALTH CARE EDUCATION/TRAINING PROGRAM

## 2024-02-27 PROCEDURE — G8427 DOCREV CUR MEDS BY ELIG CLIN: HCPCS | Performed by: STUDENT IN AN ORGANIZED HEALTH CARE EDUCATION/TRAINING PROGRAM

## 2024-02-27 PROCEDURE — G8484 FLU IMMUNIZE NO ADMIN: HCPCS | Performed by: STUDENT IN AN ORGANIZED HEALTH CARE EDUCATION/TRAINING PROGRAM

## 2024-02-27 PROCEDURE — G8417 CALC BMI ABV UP PARAM F/U: HCPCS | Performed by: STUDENT IN AN ORGANIZED HEALTH CARE EDUCATION/TRAINING PROGRAM

## 2024-02-27 PROCEDURE — 3074F SYST BP LT 130 MM HG: CPT | Performed by: STUDENT IN AN ORGANIZED HEALTH CARE EDUCATION/TRAINING PROGRAM

## 2024-02-27 PROCEDURE — 99213 OFFICE O/P EST LOW 20 MIN: CPT | Performed by: STUDENT IN AN ORGANIZED HEALTH CARE EDUCATION/TRAINING PROGRAM

## 2024-02-27 PROCEDURE — 1123F ACP DISCUSS/DSCN MKR DOCD: CPT | Performed by: STUDENT IN AN ORGANIZED HEALTH CARE EDUCATION/TRAINING PROGRAM

## 2024-02-27 PROCEDURE — 3078F DIAST BP <80 MM HG: CPT | Performed by: STUDENT IN AN ORGANIZED HEALTH CARE EDUCATION/TRAINING PROGRAM

## 2024-02-27 PROCEDURE — 1036F TOBACCO NON-USER: CPT | Performed by: STUDENT IN AN ORGANIZED HEALTH CARE EDUCATION/TRAINING PROGRAM

## 2024-02-27 PROCEDURE — G8400 PT W/DXA NO RESULTS DOC: HCPCS | Performed by: STUDENT IN AN ORGANIZED HEALTH CARE EDUCATION/TRAINING PROGRAM

## 2024-02-27 PROCEDURE — 3017F COLORECTAL CA SCREEN DOC REV: CPT | Performed by: STUDENT IN AN ORGANIZED HEALTH CARE EDUCATION/TRAINING PROGRAM

## 2024-02-27 ASSESSMENT — PATIENT HEALTH QUESTIONNAIRE - PHQ9
SUM OF ALL RESPONSES TO PHQ QUESTIONS 1-9: 0
SUM OF ALL RESPONSES TO PHQ QUESTIONS 1-9: 0
2. FEELING DOWN, DEPRESSED OR HOPELESS: 0
SUM OF ALL RESPONSES TO PHQ QUESTIONS 1-9: 0
SUM OF ALL RESPONSES TO PHQ QUESTIONS 1-9: 0
1. LITTLE INTEREST OR PLEASURE IN DOING THINGS: 0

## 2024-02-27 NOTE — PROGRESS NOTES
[place]   2024    Iris Becerra (:  1960) is a 63 y.o. female, here for evaluation of the following medical concerns:    Chief Complaint   Patient presents with    Foot Pain        ASSESSMENT/ PLAN  1. Pain of left midfoot  - AFL - Karlie Lara, [facility], Podiatry, [facility]  2. Callus  -She has some tenderness at the plantar aspect of midfoot.  No swelling noted.  There is no tenderness over palpation of medial and lateral malleolus or base of fifth metatarsal.  There is a callus on plantar aspect on the the lateral aspect of the base of the fifth metatarsal.  Differentials include plantar fasciitis.  Educated patient on this diagnosis.  Discussed measures including cold application, stretch exercises, pain medications.  Will place referral to podiatry for callus removal.  Educational materials including stress exercises given to patient.    HPI  Patient is 63-year-old female presenting with midfoot pain for the last 3 weeks.  Symptoms are worse when she gets up from the bed in the morning.  She denies any trauma.  Described the pain as sharp, intermittent, worse with walking.  Denies any swelling.    ROS    CONSTITUTIONAL:  No fevers, chills, sweats or weight changes  EYES:  No redness or visual symptoms.  EARS, NOSE AND THROAT:  No difficulties with hearing.  No symptoms of rhinitis or sore throat.  CARDIOVASCULAR:  No chest pains, palpitations, orthopnea or paroxysmal noctunal dyspnea.  RESPIRATORY:  No dyspnea o exertion, wheezing or cough.  GI:  No nausea, vomiting, diarrhea, constipation, abdominal pain, hematochezia or melena.    :  No dysuria, urinary hesitancy or dribbling.  No nocturia or urinary frequency.  No abnormal urethral  discharge.  MUSCULOSKELETAL: Endorses pain left midfoot  .    HISTORIES  Current Outpatient Medications on File Prior to Visit   Medication Sig Dispense Refill    hydroCHLOROthiazide (MICROZIDE) 12.5 MG capsule Take 1 capsule by mouth every morning 90

## 2024-04-10 ENCOUNTER — TELEPHONE (OUTPATIENT)
Dept: INTERNAL MEDICINE CLINIC | Age: 64
End: 2024-04-10

## 2024-04-10 DIAGNOSIS — I10 ESSENTIAL HYPERTENSION: Primary | ICD-10-CM

## 2024-04-10 DIAGNOSIS — E78.5 DYSLIPIDEMIA: ICD-10-CM

## 2024-04-10 NOTE — TELEPHONE ENCOUNTER
[facility] lab called and stated patient came in to get labs done before her up coming physical appt and stated she doesn't have any in her chart. Patient stated she wanted to see if you can put her labs in so she can get them done before her up coming appt

## 2024-04-11 ENCOUNTER — HOSPITAL ENCOUNTER (OUTPATIENT)
Age: 64
Discharge: HOME | End: 2024-04-11
Payer: MEDICARE

## 2024-04-11 DIAGNOSIS — E78.5 DYSLIPIDEMIA: ICD-10-CM

## 2024-04-11 DIAGNOSIS — I10 ESSENTIAL HYPERTENSION: ICD-10-CM

## 2024-04-11 LAB
ALBUMIN SERPL-MCNC: 4.3 G/DL (ref 3.4–5)
ALBUMIN/GLOB SERPL: 1.1 {RATIO} (ref 1.1–2.2)
ALP SERPL-CCNC: 74 U/L (ref 40–129)
ALT SERPL-CCNC: 17 U/L (ref 10–40)
ANION GAP SERPL CALCULATED.3IONS-SCNC: 13 MMOL/L (ref 3–16)
AST SERPL-CCNC: 18 U/L (ref 15–37)
BASOPHILS # BLD: 0 K/UL (ref 0–0.2)
BASOPHILS NFR BLD: 0.6 %
BILIRUB SERPL-MCNC: 0.3 MG/DL (ref 0–1)
BUN SERPL-MCNC: 22 MG/DL (ref 7–20)
CALCIUM SERPL-MCNC: 9.8 MG/DL (ref 8.3–10.6)
CHLORIDE SERPL-SCNC: 101 MMOL/L (ref 99–110)
CHOLEST SERPL-MCNC: 296 MG/DL (ref 0–199)
CO2 SERPL-SCNC: 26 MMOL/L (ref 21–32)
CREAT SERPL-MCNC: 0.9 MG/DL (ref 0.6–1.2)
DEPRECATED RDW RBC AUTO: 13.7 % (ref 12.4–15.4)
EOSINOPHIL # BLD: 0.2 K/UL (ref 0–0.6)
EOSINOPHIL NFR BLD: 3.7 %
GFR SERPLBLD CREATININE-BSD FMLA CKD-EPI: 71 ML/MIN/{1.73_M2}
GLUCOSE SERPL-MCNC: 89 MG/DL (ref 70–99)
HCT VFR BLD AUTO: 42.1 % (ref 36–48)
HDLC SERPL-MCNC: 50 MG/DL (ref 40–60)
HGB BLD-MCNC: 13.8 G/DL (ref 12–16)
LDLC SERPL CALC-MCNC: 214 MG/DL
LYMPHOCYTES # BLD: 0.8 K/UL (ref 1–5.1)
LYMPHOCYTES NFR BLD: 19.9 %
MCH RBC QN AUTO: 29.9 PG (ref 26–34)
MCHC RBC AUTO-ENTMCNC: 32.8 G/DL (ref 31–36)
MCV RBC AUTO: 91.2 FL (ref 80–100)
MONOCYTES # BLD: 0.4 K/UL (ref 0–1.3)
MONOCYTES NFR BLD: 9.9 %
NEUTROPHILS # BLD: 2.7 K/UL (ref 1.7–7.7)
NEUTROPHILS NFR BLD: 65.9 %
PLATELET # BLD AUTO: 233 K/UL (ref 135–450)
PMV BLD AUTO: 8.4 FL (ref 5–10.5)
POTASSIUM SERPL-SCNC: 4.1 MMOL/L (ref 3.5–5.1)
PROT SERPL-MCNC: 8.1 G/DL (ref 6.4–8.2)
RBC # BLD AUTO: 4.62 M/UL (ref 4–5.2)
SODIUM SERPL-SCNC: 140 MMOL/L (ref 136–145)
TRIGL SERPL-MCNC: 159 MG/DL (ref 0–150)
VLDLC SERPL CALC-MCNC: 32 MG/DL
WBC # BLD AUTO: 4 K/UL (ref 4–11)

## 2024-04-11 PROCEDURE — 80053 COMPREHEN METABOLIC PANEL: CPT

## 2024-04-11 PROCEDURE — 36415 COLL VENOUS BLD VENIPUNCTURE: CPT

## 2024-04-11 PROCEDURE — 85025 COMPLETE CBC W/AUTO DIFF WBC: CPT

## 2024-04-11 PROCEDURE — 80061 LIPID PANEL: CPT

## 2024-04-15 ENCOUNTER — HOSPITAL ENCOUNTER (OUTPATIENT)
Dept: WOMENS IMAGING | Age: 64
Discharge: HOME | End: 2024-04-15
Payer: MEDICARE

## 2024-04-15 VITALS — HEIGHT: 65 IN | WEIGHT: 217 LBS | BODY MASS INDEX: 36.15 KG/M2

## 2024-04-15 DIAGNOSIS — Z12.31 VISIT FOR SCREENING MAMMOGRAM: ICD-10-CM

## 2024-04-15 PROCEDURE — 77063 BREAST TOMOSYNTHESIS BI: CPT

## 2024-04-17 ENCOUNTER — TELEPHONE (OUTPATIENT)
Dept: INTERNAL MEDICINE CLINIC | Age: 64
End: 2024-04-17

## 2024-04-17 ENCOUNTER — OFFICE VISIT (OUTPATIENT)
Dept: INTERNAL MEDICINE CLINIC | Age: 64
End: 2024-04-17
Payer: MEDICARE

## 2024-04-17 ENCOUNTER — TELEPHONE (OUTPATIENT)
Dept: CARDIOLOGY CLINIC | Age: 64
End: 2024-04-17

## 2024-04-17 VITALS
DIASTOLIC BLOOD PRESSURE: 84 MMHG | HEIGHT: 65 IN | SYSTOLIC BLOOD PRESSURE: 128 MMHG | BODY MASS INDEX: 35.39 KG/M2 | WEIGHT: 212.4 LBS | OXYGEN SATURATION: 96 % | HEART RATE: 64 BPM

## 2024-04-17 DIAGNOSIS — I97.89 POSTOPERATIVE ATRIAL FIBRILLATION (HCC): ICD-10-CM

## 2024-04-17 DIAGNOSIS — E66.01 SEVERE OBESITY (BMI 35.0-39.9) WITH COMORBIDITY (HCC): ICD-10-CM

## 2024-04-17 DIAGNOSIS — M25.511 RIGHT SUBSCAPULAR PAIN: ICD-10-CM

## 2024-04-17 DIAGNOSIS — E78.5 DYSLIPIDEMIA: ICD-10-CM

## 2024-04-17 DIAGNOSIS — I10 ESSENTIAL HYPERTENSION: ICD-10-CM

## 2024-04-17 DIAGNOSIS — I48.91 POSTOPERATIVE ATRIAL FIBRILLATION (HCC): ICD-10-CM

## 2024-04-17 DIAGNOSIS — G43.109 MIGRAINE WITH AURA AND WITHOUT STATUS MIGRAINOSUS, NOT INTRACTABLE: ICD-10-CM

## 2024-04-17 DIAGNOSIS — F41.9 ANXIETY: ICD-10-CM

## 2024-04-17 DIAGNOSIS — Z00.00 WELCOME TO MEDICARE PREVENTIVE VISIT: Primary | ICD-10-CM

## 2024-04-17 DIAGNOSIS — Z78.0 ASYMPTOMATIC MENOPAUSE: ICD-10-CM

## 2024-04-17 PROCEDURE — 3079F DIAST BP 80-89 MM HG: CPT | Performed by: NURSE PRACTITIONER

## 2024-04-17 PROCEDURE — 3017F COLORECTAL CA SCREEN DOC REV: CPT | Performed by: NURSE PRACTITIONER

## 2024-04-17 PROCEDURE — 1123F ACP DISCUSS/DSCN MKR DOCD: CPT | Performed by: NURSE PRACTITIONER

## 2024-04-17 PROCEDURE — G0402 INITIAL PREVENTIVE EXAM: HCPCS | Performed by: NURSE PRACTITIONER

## 2024-04-17 PROCEDURE — 3074F SYST BP LT 130 MM HG: CPT | Performed by: NURSE PRACTITIONER

## 2024-04-17 RX ORDER — ALPRAZOLAM 0.25 MG/1
0.25 TABLET ORAL NIGHTLY PRN
Qty: 10 TABLET | Refills: 0 | Status: SHIPPED | OUTPATIENT
Start: 2024-04-17 | End: 2024-05-17

## 2024-04-17 RX ORDER — ALPRAZOLAM 0.25 MG/1
0.25 TABLET ORAL NIGHTLY PRN
COMMUNITY
End: 2024-04-17 | Stop reason: SDUPTHER

## 2024-04-17 SDOH — ECONOMIC STABILITY: FOOD INSECURITY: WITHIN THE PAST 12 MONTHS, THE FOOD YOU BOUGHT JUST DIDN'T LAST AND YOU DIDN'T HAVE MONEY TO GET MORE.: NEVER TRUE

## 2024-04-17 SDOH — ECONOMIC STABILITY: FOOD INSECURITY: WITHIN THE PAST 12 MONTHS, YOU WORRIED THAT YOUR FOOD WOULD RUN OUT BEFORE YOU GOT MONEY TO BUY MORE.: NEVER TRUE

## 2024-04-17 SDOH — ECONOMIC STABILITY: INCOME INSECURITY: HOW HARD IS IT FOR YOU TO PAY FOR THE VERY BASICS LIKE FOOD, HOUSING, MEDICAL CARE, AND HEATING?: NOT HARD AT ALL

## 2024-04-17 ASSESSMENT — PATIENT HEALTH QUESTIONNAIRE - PHQ9
SUM OF ALL RESPONSES TO PHQ QUESTIONS 1-9: 0
SUM OF ALL RESPONSES TO PHQ9 QUESTIONS 1 & 2: 0
1. LITTLE INTEREST OR PLEASURE IN DOING THINGS: NOT AT ALL
SUM OF ALL RESPONSES TO PHQ QUESTIONS 1-9: 0
2. FEELING DOWN, DEPRESSED OR HOPELESS: NOT AT ALL

## 2024-04-17 ASSESSMENT — LIFESTYLE VARIABLES
HOW OFTEN DO YOU HAVE A DRINK CONTAINING ALCOHOL: NEVER
HOW MANY STANDARD DRINKS CONTAINING ALCOHOL DO YOU HAVE ON A TYPICAL DAY: PATIENT DOES NOT DRINK

## 2024-04-17 NOTE — TELEPHONE ENCOUNTER
Jenny light from pts pcp office stated that pt wore a holter monitor in May of 2023 and pt has not heard back on the results. Ph for [facility] office is [phone] option 3 and then 1  Fx is [phone]. Please advise.

## 2024-04-17 NOTE — TELEPHONE ENCOUNTER
Called [facility] @ [phone] and requested holter monitor results. Office staff did not see results and will send message to Dr Henry staff to investigate.

## 2024-04-17 NOTE — TELEPHONE ENCOUNTER
Dr. Henry's office called and stated they have no record of a haltor monitor ordered or placed.  Patient did have a SNAP test in May and results are in Media.  Last record they have of patient having a monitor was in 2018.

## 2024-04-17 NOTE — TELEPHONE ENCOUNTER
Please call Dr Henry office and see if they have record of patient's holter monitor which she performed May 2023 and never heard back on the results.

## 2024-04-17 NOTE — PROGRESS NOTES
Medicare Annual Wellness Visit    Iris Becerra is here for Medicare AWV (Recent lab 4/11/24) and Pain (Pain in right shoulder blade area for since Friday. )    Assessment & Plan   Welcome to Medicare preventive visit  Dr Dawn Dental exam  Consider [id number] vaccine - pneumonia  Schedule CT Calcium score imaging - call [facility] or [facility]  Schedule DEXA scan for bone density - [facility] Central Scheduling : [phone]  Update eye exam  CSM reviewed.     Anxiety  -     ALPRAZolam (XANAX) 0.25 MG tablet; Take 1 tablet by mouth nightly as needed for Sleep for up to 30 days. Only as needed Max Daily Amount: 0.25 mg, Disp-10 tablet, R-0Normal    Postoperative atrial fibrillation (HCC)    Dyslipidemia  -     CT CARDIAC CALCIUM SCORING; Future  Consider Repatha in future. Perform CT Calcium score.     Essential hypertension    Right subscapular pain  - see note below.     Asymptomatic menopause  -     DEXA BONE DENSITY AXIAL SKELETON; Future    Severe obesity (BMI 35.0-39.9) with comorbidity (HCC)  Migraine with aura and without status migrainosus, not intractable    Recommendations for Preventive Services Due: see orders and patient instructions/AVS.  Recommended screening schedule for the next 5-10 years is provided to the patient in written form: see Patient Instructions/AVS.     Return in about 1 year (around 4/17/2025) for Medicare wellness.     Subjective       Ovarian Cancer, May 2024 scan. Q3 years. Immunotherapy x 1 year. Dr Hernandez and Dr Vo.     Colonoscopy 9/2023 - Dr Barger - Scheduled May 2024.     Hx Migraines. Excedrin PM -- 2 x/month. States her migraines are ocular. Weather changes make symptoms worse.    Anxiety. She RARELY takes medication, takes 1 in the last 6 years (Xanax)     HLD. Cardiology discontinued Crestor d/t side effects.      HTN. Amlodipine d/c d/t side effects.      Hx A Fib - Dr Henry.      Diverticulosis.      Dermatology: UTD 2024, q 6 months.   Eye: DUE.   Dental: DUE.   Mammogram:

## 2024-04-17 NOTE — TELEPHONE ENCOUNTER
Reviewed pts chart, no monitor ordered or placed from our office. Checked [facility] site and with Britt from  who could not find pt. PMKW agreed no monitor for pt. Called their office and relayed to front staff.

## 2024-04-17 NOTE — PATIENT INSTRUCTIONS

## 2024-04-18 NOTE — TELEPHONE ENCOUNTER
The patient confirmed she needs results of SLEEP STUDY.  She never did cardiac monitor testing.

## 2024-04-18 NOTE — TELEPHONE ENCOUNTER
She prefers the weight loss option. She has already lost 15 lbs and is trying to lose more.  She may or may not mention the device to her dentist at her next cleaning.     She declines a CPAP right now.

## 2024-04-18 NOTE — TELEPHONE ENCOUNTER
Please tell her I see the results and it shows mild sleep apnea. She could consider treating this with CPAP for mild  but also 5-10% weight loss or consideration of a dental device could be helpful.   Let me know if she would like to proceed with any of these things.

## 2024-05-03 NOTE — PROGRESS NOTES
140 mg SC every 2 weeks.  2. Continue aspirin 81 mg daily and hydrochlorothiazide 12.5 mg daily.  3. Patient reports that she recently completed 6 cycles of carboplatin and paclitaxel for recurrent ovarian cancer.  She currently appears well compensated from a cardiovascular standpoint, but both agents carry risk for potential cardiotoxicity and will obtain repeat TTE to re-evaluate cardiac function.  4. Her PCP recently ordered CT coronary calcium scoring study.  She had lower risk nuclear SPECT stress test last year.  She is not having angina and already has indications for aspirin and lipid-lowering therapy, and we discussed how results may ultimately not affect current treatment plan, but can follow up results if has study completed.  5. Recommend heart healthy, low sodium diet and regular physical exercise for at least 30 minutes a minimum of 3-5 times per week.  6. Follow-up with me in one year.       Scribe's attestation:  This note was scribed in the presence of Tyson Henry DO by Estella Moran RN       It is a pleasure to assist in the care of Iris Becerra. Please call with any questions.  The scribe’s documentation has been prepared under my direction and personally reviewed by me in its entirety.  I confirm that the note above accurately reflects all work, treatment, procedures, and medical decision making performed by me.  I, Dr. Tyson Henry, personally performed the services described in this documentation as scribed by Estella Moran RN in my presence, and it is both accurate and complete to the best of our ability.         Tyson Henry DO  [facility]  [phone] [place] Office  [phone] [facility]

## 2024-05-06 ENCOUNTER — OFFICE VISIT (OUTPATIENT)
Dept: CARDIOLOGY CLINIC | Age: 64
End: 2024-05-06
Payer: MEDICARE

## 2024-05-06 VITALS
HEART RATE: 64 BPM | OXYGEN SATURATION: 95 % | DIASTOLIC BLOOD PRESSURE: 79 MMHG | BODY MASS INDEX: 35.16 KG/M2 | HEIGHT: 65 IN | SYSTOLIC BLOOD PRESSURE: 136 MMHG | WEIGHT: 211 LBS

## 2024-05-06 DIAGNOSIS — R07.89 ATYPICAL CHEST PAIN: ICD-10-CM

## 2024-05-06 DIAGNOSIS — I10 ESSENTIAL HYPERTENSION: ICD-10-CM

## 2024-05-06 DIAGNOSIS — Z78.9 STATIN INTOLERANCE: ICD-10-CM

## 2024-05-06 DIAGNOSIS — Z92.21 HISTORY OF CHEMOTHERAPY: ICD-10-CM

## 2024-05-06 DIAGNOSIS — I97.89 POSTOPERATIVE ATRIAL FIBRILLATION (HCC): Primary | ICD-10-CM

## 2024-05-06 DIAGNOSIS — Z85.43 HISTORY OF OVARIAN CANCER: ICD-10-CM

## 2024-05-06 DIAGNOSIS — I48.91 POSTOPERATIVE ATRIAL FIBRILLATION (HCC): Primary | ICD-10-CM

## 2024-05-06 DIAGNOSIS — E78.5 DYSLIPIDEMIA: ICD-10-CM

## 2024-05-06 PROCEDURE — 93000 ELECTROCARDIOGRAM COMPLETE: CPT | Performed by: INTERNAL MEDICINE

## 2024-05-06 PROCEDURE — 1036F TOBACCO NON-USER: CPT | Performed by: INTERNAL MEDICINE

## 2024-05-06 PROCEDURE — 3075F SYST BP GE 130 - 139MM HG: CPT | Performed by: INTERNAL MEDICINE

## 2024-05-06 PROCEDURE — G8427 DOCREV CUR MEDS BY ELIG CLIN: HCPCS | Performed by: INTERNAL MEDICINE

## 2024-05-06 PROCEDURE — 3078F DIAST BP <80 MM HG: CPT | Performed by: INTERNAL MEDICINE

## 2024-05-06 PROCEDURE — G8400 PT W/DXA NO RESULTS DOC: HCPCS | Performed by: INTERNAL MEDICINE

## 2024-05-06 PROCEDURE — 3017F COLORECTAL CA SCREEN DOC REV: CPT | Performed by: INTERNAL MEDICINE

## 2024-05-06 PROCEDURE — G8417 CALC BMI ABV UP PARAM F/U: HCPCS | Performed by: INTERNAL MEDICINE

## 2024-05-06 PROCEDURE — 1090F PRES/ABSN URINE INCON ASSESS: CPT | Performed by: INTERNAL MEDICINE

## 2024-05-06 PROCEDURE — 1123F ACP DISCUSS/DSCN MKR DOCD: CPT | Performed by: INTERNAL MEDICINE

## 2024-05-06 PROCEDURE — 99214 OFFICE O/P EST MOD 30 MIN: CPT | Performed by: INTERNAL MEDICINE

## 2024-05-06 RX ORDER — HYDROCHLOROTHIAZIDE 12.5 MG/1
12.5 CAPSULE, GELATIN COATED ORAL EVERY MORNING
Qty: 90 CAPSULE | Refills: 3 | Status: SHIPPED | OUTPATIENT
Start: 2024-05-06

## 2024-05-06 NOTE — PATIENT INSTRUCTIONS
Your provider has ordered testing for further evaluation.  An order/prescription has been included in your paper work.   To schedule outpatient testing, contact Central Scheduling by calling [employer] ([phone]).   1. Overall, patient appears to be stable from a cardiovascular standpoint.  2. Will continue aspirin 81 mg daily and hydrochlorothiazide 12.5 mg daily  3. Start Repatha 140 mg/ml subcutaneous injection every 14 days ~ dyslipidemia, statin intolerance.  4. Discussed cardiac calcium score will not ultimately change plan of care based on results  5. Continue to encourage heart healthy, low sodium diet and regular physical exercise for at least 30 minutes a minimum of 3-5 times per week.   6. EKG reviewed  7. Order echocardiogram ~ given chemotherapy treatment to assess heart strength  8. May proceed with colonoscopy as planned.   9. Follow-up with me in one year.

## 2024-06-13 ENCOUNTER — TELEPHONE (OUTPATIENT)
Dept: CARDIOLOGY CLINIC | Age: 64
End: 2024-06-13

## 2024-06-13 NOTE — TELEPHONE ENCOUNTER
Attempted to call pt, no answer, LVM for pt to call back. Please let patient know:    ----- Message from Tyson Henry, DO sent at 6/13/2024  8:01 AM EDT -----  Please let patient know that overall heart pumping function is normal.  There is mild leakiness of her aortic, mitral and tricuspid valves which does not require additional treatment at this time.

## 2024-08-27 ENCOUNTER — TELEPHONE (OUTPATIENT)
Dept: CARDIOLOGY CLINIC | Age: 64
End: 2024-08-27

## 2024-08-27 NOTE — TELEPHONE ENCOUNTER
[facility] please advise.    Patient is having generalized aches noticeably in hands, legs, joints, as well as, head aches since starting repatha and her symptoms are getting worse. (She has done a total of four injections).  She has also noticed her blood pressure trending up to 140s when she goes in for her Avastin treatments for her cancer and was not sure if this could all be related. She \"just does not feel the same since starting repatha\".

## 2024-08-27 NOTE — TELEPHONE ENCOUNTER
Pt called and stated she does not like repatha and unsure if she wants to continue medication. Stated she has very bad joint pain,. Requesting returned call from RN to discuss

## 2024-08-28 NOTE — TELEPHONE ENCOUNTER
Called and spoke with patient. Relayed [facility] instructions she VU. She will call office in 2 weeks with update and hold Repatha

## 2024-08-28 NOTE — TELEPHONE ENCOUNTER
Lets please have patient stop taking Repatha and see if symptoms improve.  Please have her notify us in 2 weeks if symptoms have improved.

## 2024-09-24 NOTE — PROGRESS NOTES
days  Patient not taking: Reported on 9/25/2024 5/6/24   Tyson Henry DO   aspirin 81 MG chewable tablet Take 1 tablet by mouth daily  Patient not taking: Reported on 9/25/2024 3/24/23   Ari Sen MD          Allergies:  Amlodipine, Crestor [rosuvastatin], Lipitor, Penicillins, and Percocet [oxycodone-acetaminophen]     Review of Systems:   Negative except as noted above.     Objective:   PHYSICAL EXAM:    Vitals:    09/25/24 1626   BP: 128/76   Pulse: 77   SpO2: 96%     General: Adult female in no acute distress. Pleasant and interactive on exam.  HEENT: Normocephalic, atraumatic, non-icteric, hearing intact, nares normal, mucous membranes moist.  Neck: No JVD.  Heart: Regular rate and rhythm. Normal S1 and S2. No murmurs, gallops or rubs.  Lungs: Normal respiratory effort. Clear to auscultation bilaterally. No wheezes, rales, or rhonchi.  Skin: No rashes, wounds, or lesions.  Pulses: Radial pulses 2+.  Bilateral dorsalis pedis pulses are 2+.  Extremities: No clubbing, cyanosis, or edema.  Psych: Normal mood and affect.  Neuro: Alert and oriented to person, place, and time. No focal deficits noted.       LABS   CBC:   Lab Results   Component Value Date/Time    WBC 4.0 04/11/2024 08:42 AM    RBC 4.62 04/11/2024 08:42 AM    HGB 13.8 04/11/2024 08:42 AM    HCT 42.1 04/11/2024 08:42 AM    MCV 91.2 04/11/2024 08:42 AM    RDW 13.7 04/11/2024 08:42 AM     04/11/2024 08:42 AM     CMP:  Lab Results   Component Value Date/Time     04/11/2024 08:42 AM    K 4.1 04/11/2024 08:42 AM    K 4.2 03/23/2023 05:45 AM     04/11/2024 08:42 AM    CO2 26 04/11/2024 08:42 AM    BUN 22 04/11/2024 08:42 AM    CREATININE 1.0 08/26/2024 03:57 PM    GFRAA >60 12/13/2021 07:52 AM    GFRAA >60 01/18/2013 09:18 AM    AGRATIO 1.1 04/11/2024 08:42 AM    LABGLOM 71 04/11/2024 08:42 AM    GLUCOSE 89 04/11/2024 08:42 AM    CALCIUM 9.8 04/11/2024 08:42 AM    BILITOT 0.3 04/11/2024 08:42 AM    ALKPHOS 74 04/11/2024 08:42 AM

## 2024-09-25 ENCOUNTER — OFFICE VISIT (OUTPATIENT)
Dept: CARDIOLOGY CLINIC | Age: 64
End: 2024-09-25

## 2024-09-25 VITALS
WEIGHT: 216 LBS | DIASTOLIC BLOOD PRESSURE: 76 MMHG | SYSTOLIC BLOOD PRESSURE: 128 MMHG | HEART RATE: 77 BPM | OXYGEN SATURATION: 96 % | BODY MASS INDEX: 35.99 KG/M2 | HEIGHT: 65 IN

## 2024-09-25 DIAGNOSIS — E78.5 HYPERLIPIDEMIA, UNSPECIFIED HYPERLIPIDEMIA TYPE: ICD-10-CM

## 2024-09-25 DIAGNOSIS — I10 ESSENTIAL HYPERTENSION: Primary | ICD-10-CM

## 2024-09-25 DIAGNOSIS — M79.10 MYALGIA: ICD-10-CM

## 2024-09-25 DIAGNOSIS — R94.39 ABNORMAL STRESS TEST: ICD-10-CM

## 2024-09-25 DIAGNOSIS — Z85.43 HISTORY OF OVARIAN CANCER: ICD-10-CM

## 2024-09-25 DIAGNOSIS — I97.89 POSTOPERATIVE ATRIAL FIBRILLATION (HCC): ICD-10-CM

## 2024-09-25 DIAGNOSIS — M79.604 RIGHT LEG PAIN: ICD-10-CM

## 2024-09-25 DIAGNOSIS — I48.91 POSTOPERATIVE ATRIAL FIBRILLATION (HCC): ICD-10-CM

## 2024-09-25 DIAGNOSIS — Z86.718 HISTORY OF DVT (DEEP VEIN THROMBOSIS): ICD-10-CM

## 2024-09-25 RX ORDER — SIMVASTATIN 10 MG
10 TABLET ORAL NIGHTLY
Qty: 90 TABLET | Refills: 1 | Status: SHIPPED | OUTPATIENT
Start: 2024-09-25

## 2024-09-25 NOTE — PATIENT INSTRUCTIONS
Continue aspirin 81 mg daily and hydrochlorothiazide 12.5 mg daily.  Stay off repatha  Start taking Simvastatin 10 mg daily, and let us know how you are feeling.  Repeat blood work BMP and magnesium level in one week after starting simvastatin.  Talk to your oncologist about leqvio to help better control your cholesterol.  Ordered Vascular ultrasound of lower extremity to assess for blood clots.  Call [employer] scheduling at [phone] to schedule testing.  Follow-up with me in 6 months.

## 2024-10-16 ENCOUNTER — HOSPITAL ENCOUNTER (OUTPATIENT)
Dept: VASCULAR LAB | Age: 64
Discharge: HOME | End: 2024-10-18
Attending: INTERNAL MEDICINE
Payer: MEDICARE

## 2024-10-16 ENCOUNTER — HOSPITAL ENCOUNTER (OUTPATIENT)
Age: 64
Discharge: HOME | End: 2024-10-16
Payer: MEDICARE

## 2024-10-16 DIAGNOSIS — M79.604 RIGHT LEG PAIN: ICD-10-CM

## 2024-10-16 DIAGNOSIS — M79.10 MYALGIA: ICD-10-CM

## 2024-10-16 LAB
ANION GAP SERPL CALCULATED.3IONS-SCNC: 12 MMOL/L (ref 3–16)
BUN SERPL-MCNC: 19 MG/DL (ref 7–20)
CALCIUM SERPL-MCNC: 9.7 MG/DL (ref 8.3–10.6)
CHLORIDE SERPL-SCNC: 102 MMOL/L (ref 99–110)
CO2 SERPL-SCNC: 28 MMOL/L (ref 21–32)
CREAT SERPL-MCNC: 0.9 MG/DL (ref 0.6–1.2)
GFR SERPLBLD CREATININE-BSD FMLA CKD-EPI: 70 ML/MIN/{1.73_M2}
GLUCOSE SERPL-MCNC: 86 MG/DL (ref 70–99)
MAGNESIUM SERPL-MCNC: 1.72 MG/DL (ref 1.8–2.4)
POTASSIUM SERPL-SCNC: 4.1 MMOL/L (ref 3.5–5.1)
SODIUM SERPL-SCNC: 142 MMOL/L (ref 136–145)
VAS LEFT ABI: 1.02
VAS LEFT ARM BP: 153 MMHG
VAS LEFT ATA DIST PSV: 78.3 CM/S
VAS LEFT CFA DIST PSV: 95.6 CM/S
VAS LEFT CFA PROX PSV: 107 CM/S
VAS LEFT DORSALIS PEDIS BP: 140 MMHG
VAS LEFT PERONEAL MID PSV: 51.4 CM/S
VAS LEFT PFA PROX PSV: 75.8 CM/S
VAS LEFT POP A DIST PSV: 73.9 CM/S
VAS LEFT POP A PROX PSV: 76.4 CM/S
VAS LEFT POP A PROX VEL RATIO: 0.81
VAS LEFT PTA BP: 160 MMHG
VAS LEFT PTA DIST PSV: 58 CM/S
VAS LEFT PTA MID PSV: 67.6 CM/S
VAS LEFT SFA DIST PSV: 94.4 CM/S
VAS LEFT SFA DIST VEL RATIO: 0.83
VAS LEFT SFA MID PSV: 114 CM/S
VAS LEFT SFA MID VEL RATIO: 1.13
VAS LEFT SFA PROX PSV: 101 CM/S
VAS LEFT SFA PROX VEL RATIO: 0.94
VAS RIGHT ABI: 1.02
VAS RIGHT ARM BP: 157 MMHG
VAS RIGHT ATA DIST PSV: 118 CM/S
VAS RIGHT CFA DIST PSV: 84.5 CM/S
VAS RIGHT CFA PROX PSV: 111 CM/S
VAS RIGHT DORSALIS PEDIS BP: 160 MMHG
VAS RIGHT PERONEAL MID PSV: 50.5 CM/S
VAS RIGHT PFA PROX PSV: 76.4 CM/S
VAS RIGHT POP A DIST PSV: 75.2 CM/S
VAS RIGHT POP A PROX PSV: 74.5 CM/S
VAS RIGHT POP A PROX VEL RATIO: 0.94
VAS RIGHT PTA BP: 138 MMHG
VAS RIGHT PTA DIST PSV: 61.5 CM/S
VAS RIGHT PTA MID PSV: 72.4 CM/S
VAS RIGHT SFA DIST PSV: 79.5 CM/S
VAS RIGHT SFA DIST VEL RATIO: 0.8
VAS RIGHT SFA MID PSV: 100 CM/S
VAS RIGHT SFA MID VEL RATIO: 1.2
VAS RIGHT SFA PROX PSV: 86.4 CM/S
VAS RIGHT SFA PROX VEL RATIO: 0.8

## 2024-10-16 PROCEDURE — 93971 EXTREMITY STUDY: CPT

## 2024-10-16 PROCEDURE — 83735 ASSAY OF MAGNESIUM: CPT

## 2024-10-16 PROCEDURE — 36415 COLL VENOUS BLD VENIPUNCTURE: CPT

## 2024-10-16 PROCEDURE — 93925 LOWER EXTREMITY STUDY: CPT

## 2024-10-16 PROCEDURE — 80048 BASIC METABOLIC PNL TOTAL CA: CPT

## 2024-10-24 DIAGNOSIS — E83.42 HYPOMAGNESEMIA: Primary | ICD-10-CM

## 2024-10-24 RX ORDER — MAGNESIUM OXIDE 400 MG/1
400 TABLET ORAL DAILY
Qty: 30 TABLET | Refills: 1
Start: 2024-10-24

## 2024-11-27 ENCOUNTER — HOSPITAL ENCOUNTER (OUTPATIENT)
Age: 64
Discharge: HOME | End: 2024-11-27
Payer: MEDICARE

## 2024-11-27 DIAGNOSIS — E83.42 HYPOMAGNESEMIA: ICD-10-CM

## 2024-11-27 LAB — MAGNESIUM SERPL-MCNC: 1.93 MG/DL (ref 1.8–2.4)

## 2024-11-27 PROCEDURE — 36415 COLL VENOUS BLD VENIPUNCTURE: CPT

## 2024-11-27 PROCEDURE — 83735 ASSAY OF MAGNESIUM: CPT

## 2025-01-21 ENCOUNTER — NURSE ONLY (OUTPATIENT)
Dept: INTERNAL MEDICINE CLINIC | Age: 65
End: 2025-01-21

## 2025-03-11 ENCOUNTER — TELEPHONE (OUTPATIENT)
Dept: INTERNAL MEDICINE CLINIC | Age: 65
End: 2025-03-11

## 2025-03-11 DIAGNOSIS — E78.5 DYSLIPIDEMIA: Primary | ICD-10-CM

## 2025-03-11 DIAGNOSIS — I10 ESSENTIAL HYPERTENSION: ICD-10-CM

## 2025-04-14 DIAGNOSIS — I10 ESSENTIAL HYPERTENSION: ICD-10-CM

## 2025-04-14 DIAGNOSIS — E78.5 DYSLIPIDEMIA: ICD-10-CM

## 2025-04-15 ENCOUNTER — RESULTS FOLLOW-UP (OUTPATIENT)
Dept: INTERNAL MEDICINE CLINIC | Age: 65
End: 2025-04-15

## 2025-04-15 LAB
ALBUMIN SERPL-MCNC: 4.1 G/DL (ref 3.4–5)
ALBUMIN/GLOB SERPL: 1.5 {RATIO} (ref 1.1–2.2)
ALP SERPL-CCNC: 95 U/L (ref 40–129)
ALT SERPL-CCNC: 68 U/L (ref 10–40)
ANION GAP SERPL CALCULATED.3IONS-SCNC: 13 MMOL/L (ref 3–16)
AST SERPL-CCNC: 68 U/L (ref 15–37)
BILIRUB SERPL-MCNC: 0.6 MG/DL (ref 0–1)
BUN SERPL-MCNC: 21 MG/DL (ref 7–20)
CALCIUM SERPL-MCNC: 9 MG/DL (ref 8.3–10.6)
CHLORIDE SERPL-SCNC: 101 MMOL/L (ref 99–110)
CHOLEST SERPL-MCNC: 201 MG/DL (ref 0–199)
CO2 SERPL-SCNC: 26 MMOL/L (ref 21–32)
CREAT SERPL-MCNC: 0.8 MG/DL (ref 0.6–1.2)
GFR SERPLBLD CREATININE-BSD FMLA CKD-EPI: 81 ML/MIN/{1.73_M2}
GLUCOSE SERPL-MCNC: 88 MG/DL (ref 70–99)
HDLC SERPL-MCNC: 56 MG/DL (ref 40–60)
LDLC SERPL CALC-MCNC: 123 MG/DL
POTASSIUM SERPL-SCNC: 3.7 MMOL/L (ref 3.5–5.1)
PROT SERPL-MCNC: 6.9 G/DL (ref 6.4–8.2)
SODIUM SERPL-SCNC: 140 MMOL/L (ref 136–145)
TRIGL SERPL-MCNC: 112 MG/DL (ref 0–150)
VLDLC SERPL CALC-MCNC: 22 MG/DL

## 2025-04-17 ENCOUNTER — OFFICE VISIT (OUTPATIENT)
Dept: INTERNAL MEDICINE CLINIC | Age: 65
End: 2025-04-17
Payer: MEDICARE

## 2025-04-17 VITALS
DIASTOLIC BLOOD PRESSURE: 88 MMHG | SYSTOLIC BLOOD PRESSURE: 137 MMHG | HEIGHT: 65 IN | OXYGEN SATURATION: 95 % | BODY MASS INDEX: 36.32 KG/M2 | WEIGHT: 218 LBS | HEART RATE: 66 BPM

## 2025-04-17 DIAGNOSIS — E78.5 HYPERLIPIDEMIA, UNSPECIFIED HYPERLIPIDEMIA TYPE: ICD-10-CM

## 2025-04-17 DIAGNOSIS — I10 ESSENTIAL HYPERTENSION: ICD-10-CM

## 2025-04-17 DIAGNOSIS — I48.91 POSTOPERATIVE ATRIAL FIBRILLATION (HCC): ICD-10-CM

## 2025-04-17 DIAGNOSIS — I97.89 POSTOPERATIVE ATRIAL FIBRILLATION (HCC): ICD-10-CM

## 2025-04-17 DIAGNOSIS — F41.9 ANXIETY: ICD-10-CM

## 2025-04-17 DIAGNOSIS — Z00.00 INITIAL MEDICARE ANNUAL WELLNESS VISIT: Primary | ICD-10-CM

## 2025-04-17 DIAGNOSIS — Z78.0 ASYMPTOMATIC MENOPAUSE: ICD-10-CM

## 2025-04-17 PROCEDURE — 3017F COLORECTAL CA SCREEN DOC REV: CPT | Performed by: NURSE PRACTITIONER

## 2025-04-17 PROCEDURE — 3075F SYST BP GE 130 - 139MM HG: CPT | Performed by: NURSE PRACTITIONER

## 2025-04-17 PROCEDURE — 3079F DIAST BP 80-89 MM HG: CPT | Performed by: NURSE PRACTITIONER

## 2025-04-17 PROCEDURE — 1159F MED LIST DOCD IN RCRD: CPT | Performed by: NURSE PRACTITIONER

## 2025-04-17 PROCEDURE — 1160F RVW MEDS BY RX/DR IN RCRD: CPT | Performed by: NURSE PRACTITIONER

## 2025-04-17 PROCEDURE — 1123F ACP DISCUSS/DSCN MKR DOCD: CPT | Performed by: NURSE PRACTITIONER

## 2025-04-17 PROCEDURE — G0438 PPPS, INITIAL VISIT: HCPCS | Performed by: NURSE PRACTITIONER

## 2025-04-17 RX ORDER — HYDROCHLOROTHIAZIDE 12.5 MG/1
12.5 CAPSULE ORAL EVERY MORNING
Qty: 90 CAPSULE | Refills: 3 | Status: SHIPPED | OUTPATIENT
Start: 2025-04-17

## 2025-04-17 RX ORDER — ALPRAZOLAM 0.25 MG
0.25 TABLET ORAL NIGHTLY PRN
Qty: 10 TABLET | Refills: 0 | Status: SHIPPED | OUTPATIENT
Start: 2025-04-17 | End: 2025-05-17

## 2025-04-17 RX ORDER — SIMVASTATIN 10 MG
10 TABLET ORAL NIGHTLY
Qty: 90 TABLET | Refills: 1 | Status: SHIPPED | OUTPATIENT
Start: 2025-04-17

## 2025-04-17 SDOH — ECONOMIC STABILITY: FOOD INSECURITY: WITHIN THE PAST 12 MONTHS, YOU WORRIED THAT YOUR FOOD WOULD RUN OUT BEFORE YOU GOT MONEY TO BUY MORE.: NEVER TRUE

## 2025-04-17 SDOH — ECONOMIC STABILITY: FOOD INSECURITY: WITHIN THE PAST 12 MONTHS, THE FOOD YOU BOUGHT JUST DIDN'T LAST AND YOU DIDN'T HAVE MONEY TO GET MORE.: NEVER TRUE

## 2025-04-17 ASSESSMENT — LIFESTYLE VARIABLES
HOW MANY STANDARD DRINKS CONTAINING ALCOHOL DO YOU HAVE ON A TYPICAL DAY: PATIENT DOES NOT DRINK
HOW OFTEN DO YOU HAVE A DRINK CONTAINING ALCOHOL: NEVER

## 2025-04-17 ASSESSMENT — PATIENT HEALTH QUESTIONNAIRE - PHQ9
SUM OF ALL RESPONSES TO PHQ QUESTIONS 1-9: 0
2. FEELING DOWN, DEPRESSED OR HOPELESS: NOT AT ALL

## 2025-04-17 NOTE — PATIENT INSTRUCTIONS
Learning About Being Active as an Older Adult  Why is being active important as you get older?     Being active is one of the best things you can do for your health. And it's never too late to start. Being active--or getting active, if you aren't already--has definite benefits. It can:  Give you more energy,  Keep your mind sharp.  Improve balance to reduce your risk of falls.  Help you manage chronic illness with fewer medicines.  No matter how old you are, how fit you are, or what health problems you have, there is a form of activity that will work for you. And the more physical activity you can do, the better your overall health will be.  What kinds of activity can help you stay healthy?  Being more active will make your daily activities easier. Physical activity includes planned exercise and things you do in daily life. There are four types of activity:  Aerobic.  Doing aerobic activity makes your heart and lungs strong.  Includes walking, dancing, and gardening.  Aim for at least 2½ hours spread throughout the week.  It improves your energy and can help you sleep better.  Muscle-strengthening.  This type of activity can help maintain muscle and strengthen bones.  Includes climbing stairs, using resistance bands, and lifting or carrying heavy loads.  Aim for at least twice a week.  It can help protect the knees and other joints.  Stretching.  Stretching gives you better range of motion in joints and muscles.  Includes upper arm stretches, calf stretches, and gentle yoga.  Aim for at least twice a week, preferably after your muscles are warmed up from other activities.  It can help you function better in daily life.  Balancing.  This helps you stay coordinated and have good posture.  Includes heel-to-toe walking, deejay chi, and certain types of yoga.  Aim for at least 3 days a week.  It can reduce your risk of falling.  Even if you have a hard time meeting the recommendations, it's better to be more active

## 2025-04-17 NOTE — PROGRESS NOTES
Medicare Annual Wellness Visit    Iris Becerra is here for Medicare AWV    Assessment & Plan   Initial Medicare annual wellness visit    Consider RSV and PCV20 (pneumonia) vaccine at local pharmacy  Schedule DXA scan with Mammogram  Colonoscopy 5/2025    Anxiety   - CSM reviewed. Due med contract, will perform at next OV  -     ALPRAZolam (XANAX) 0.25 MG tablet; Take 1 tablet by mouth nightly as needed for Sleep for up to 30 days. Only as needed Max Daily Amount: 0.25 mg, Disp-10 tablet, R-0Normal    Postoperative atrial fibrillation (HCC)   - No treatment performed in 2018. Stable    Essential hypertension  -     hydroCHLOROthiazide 12.5 MG capsule; Take 1 capsule by mouth every morning, Disp-90 capsule, R-3Normal    Hyperlipidemia, unspecified hyperlipidemia type  -     simvastatin (ZOCOR) 10 MG tablet; Take 1 tablet by mouth nightly, Disp-90 tablet, R-1Normal    Asymptomatic menopause  -     DEXA BONE DENSITY AXIAL SKELETON; Future       Return in about 1 year (around 4/17/2026) for Medicare wellness.     Subjective       2 on, 1 off for chemo currently. Relapse 1/2025. Changed medications d/t neuropathy. Dr Hernandez q 3 months. Dr Vo ([facility]). CT scan 4/2025.   Considers retiring 1/2026. May be able to go part-time.   Sleep is great but does fall asleep in the chair and moves to bed.     HLD. Repatha side effects self d/t 2024, pre syncope and cramps. Simvastatin 10mg daily since 9/2024 by Dr Henry. Crestor/Lipitor side effects.     Migraines. Excedrin migraine PRN. Not using Triptan.     Elevated LFTs. [facility] monitoring.     Colonoscopy, May 2025.     Patient's complete Health Risk Assessment and screening values have been reviewed and are found in Flowsheets. The following problems were reviewed today and where indicated follow up appointments were made and/or referrals ordered.    Positive Risk Factor Screenings with Interventions:              Inactivity:  On average, how many days per week do you engage in

## 2025-06-04 ENCOUNTER — APPOINTMENT (OUTPATIENT)
Dept: CT IMAGING | Age: 65
DRG: 233 | End: 2025-06-04
Attending: EMERGENCY MEDICINE
Payer: MEDICARE

## 2025-06-04 ENCOUNTER — APPOINTMENT (OUTPATIENT)
Dept: GENERAL RADIOLOGY | Age: 65
DRG: 233 | End: 2025-06-04
Payer: MEDICARE

## 2025-06-04 ENCOUNTER — HOSPITAL ENCOUNTER (INPATIENT)
Age: 65
LOS: 8 days | Discharge: HOME | DRG: 233 | End: 2025-06-12
Attending: EMERGENCY MEDICINE | Admitting: THORACIC SURGERY (CARDIOTHORACIC VASCULAR SURGERY)
Payer: MEDICARE

## 2025-06-04 DIAGNOSIS — I25.10 CAD (CORONARY ARTERY DISEASE): ICD-10-CM

## 2025-06-04 DIAGNOSIS — I25.10 MULTIPLE VESSEL CORONARY ARTERY DISEASE: ICD-10-CM

## 2025-06-04 DIAGNOSIS — I21.4 NSTEMI (NON-ST ELEVATED MYOCARDIAL INFARCTION) (HCC): Primary | ICD-10-CM

## 2025-06-04 DIAGNOSIS — R07.9 CHEST PAIN, UNSPECIFIED TYPE: ICD-10-CM

## 2025-06-04 LAB
ALBUMIN SERPL-MCNC: 4.2 G/DL (ref 3.4–5)
ALBUMIN/GLOB SERPL: 1.2 {RATIO} (ref 1.1–2.2)
ALP SERPL-CCNC: 102 U/L (ref 40–129)
ALT SERPL-CCNC: 32 U/L (ref 10–40)
ANION GAP SERPL CALCULATED.3IONS-SCNC: 15 MMOL/L (ref 3–16)
APTT BLD: 79.8 SEC (ref 22.1–36.4)
AST SERPL-CCNC: 34 U/L (ref 15–37)
BASOPHILS # BLD: 0.1 K/UL (ref 0–0.2)
BASOPHILS NFR BLD: 1.9 %
BILIRUB SERPL-MCNC: 0.3 MG/DL (ref 0–1)
BUN SERPL-MCNC: 29 MG/DL (ref 7–20)
CALCIUM SERPL-MCNC: 9.6 MG/DL (ref 8.3–10.6)
CHLORIDE SERPL-SCNC: 105 MMOL/L (ref 99–110)
CO2 SERPL-SCNC: 23 MMOL/L (ref 21–32)
CREAT SERPL-MCNC: 1.1 MG/DL (ref 0.6–1.2)
DEPRECATED RDW RBC AUTO: 20 % (ref 12.4–15.4)
DEPRECATED RDW RBC AUTO: 20.3 % (ref 12.4–15.4)
EKG ATRIAL RATE: 56 BPM
EKG ATRIAL RATE: 60 BPM
EKG ATRIAL RATE: 79 BPM
EKG DIAGNOSIS: NORMAL
EKG P AXIS: 44 DEGREES
EKG P AXIS: 50 DEGREES
EKG P AXIS: 52 DEGREES
EKG P-R INTERVAL: 136 MS
EKG P-R INTERVAL: 142 MS
EKG P-R INTERVAL: 144 MS
EKG Q-T INTERVAL: 348 MS
EKG Q-T INTERVAL: 412 MS
EKG Q-T INTERVAL: 414 MS
EKG QRS DURATION: 78 MS
EKG QRS DURATION: 80 MS
EKG QRS DURATION: 82 MS
EKG QTC CALCULATION (BAZETT): 399 MS
EKG QTC CALCULATION (BAZETT): 399 MS
EKG QTC CALCULATION (BAZETT): 412 MS
EKG R AXIS: 3 DEGREES
EKG R AXIS: 5 DEGREES
EKG R AXIS: 6 DEGREES
EKG T AXIS: 204 DEGREES
EKG T AXIS: 75 DEGREES
EKG T AXIS: 87 DEGREES
EKG VENTRICULAR RATE: 56 BPM
EKG VENTRICULAR RATE: 60 BPM
EKG VENTRICULAR RATE: 79 BPM
EOSINOPHIL # BLD: 0 K/UL (ref 0–0.6)
EOSINOPHIL NFR BLD: 1.1 %
GFR SERPLBLD CREATININE-BSD FMLA CKD-EPI: 55 ML/MIN/{1.73_M2}
GLUCOSE SERPL-MCNC: 94 MG/DL (ref 70–99)
HCT VFR BLD AUTO: 30.6 % (ref 36–48)
HCT VFR BLD AUTO: 34.9 % (ref 36–48)
HGB BLD-MCNC: 10.1 G/DL (ref 12–16)
HGB BLD-MCNC: 11.5 G/DL (ref 12–16)
INR PPP: 1.14 (ref 0.85–1.15)
LYMPHOCYTES # BLD: 0.9 K/UL (ref 1–5.1)
LYMPHOCYTES NFR BLD: 27.3 %
MCH RBC QN AUTO: 32.7 PG (ref 26–34)
MCH RBC QN AUTO: 33.1 PG (ref 26–34)
MCHC RBC AUTO-ENTMCNC: 32.9 G/DL (ref 31–36)
MCHC RBC AUTO-ENTMCNC: 33 G/DL (ref 31–36)
MCV RBC AUTO: 100.5 FL (ref 80–100)
MCV RBC AUTO: 99.3 FL (ref 80–100)
MONOCYTES # BLD: 0.1 K/UL (ref 0–1.3)
MONOCYTES NFR BLD: 3.8 %
NEUTROPHILS # BLD: 2.2 K/UL (ref 1.7–7.7)
NEUTROPHILS NFR BLD: 65.9 %
PLATELET # BLD AUTO: 152 K/UL (ref 135–450)
PLATELET # BLD AUTO: 199 K/UL (ref 135–450)
PMV BLD AUTO: 8.2 FL (ref 5–10.5)
PMV BLD AUTO: 8.7 FL (ref 5–10.5)
POTASSIUM SERPL-SCNC: 4.6 MMOL/L (ref 3.5–5.1)
PROT SERPL-MCNC: 7.7 G/DL (ref 6.4–8.2)
PROTHROMBIN TIME: 14.8 SEC (ref 11.9–14.9)
RBC # BLD AUTO: 3.08 M/UL (ref 4–5.2)
RBC # BLD AUTO: 3.48 M/UL (ref 4–5.2)
SODIUM SERPL-SCNC: 143 MMOL/L (ref 136–145)
TROPONIN, HIGH SENSITIVITY: 43 NG/L (ref 0–14)
TROPONIN, HIGH SENSITIVITY: 45 NG/L (ref 0–14)
TROPONIN, HIGH SENSITIVITY: 46 NG/L (ref 0–14)
WBC # BLD AUTO: 3 K/UL (ref 4–11)
WBC # BLD AUTO: 3.4 K/UL (ref 4–11)

## 2025-06-04 PROCEDURE — 5A02210 ASSISTANCE WITH CARDIAC OUTPUT USING BALLOON PUMP, CONTINUOUS: ICD-10-PCS | Performed by: INTERNAL MEDICINE

## 2025-06-04 PROCEDURE — B211YZZ FLUOROSCOPY OF MULTIPLE CORONARY ARTERIES USING OTHER CONTRAST: ICD-10-PCS | Performed by: INTERNAL MEDICINE

## 2025-06-04 PROCEDURE — 99291 CRITICAL CARE FIRST HOUR: CPT | Performed by: INTERNAL MEDICINE

## 2025-06-04 PROCEDURE — 6360000002 HC RX W HCPCS

## 2025-06-04 PROCEDURE — 6370000000 HC RX 637 (ALT 250 FOR IP): Performed by: INTERNAL MEDICINE

## 2025-06-04 PROCEDURE — 2500000003 HC RX 250 WO HCPCS: Performed by: INTERNAL MEDICINE

## 2025-06-04 PROCEDURE — 6360000002 HC RX W HCPCS: Performed by: INTERNAL MEDICINE

## 2025-06-04 PROCEDURE — 85027 COMPLETE CBC AUTOMATED: CPT

## 2025-06-04 PROCEDURE — 51702 INSERT TEMP BLADDER CATH: CPT

## 2025-06-04 PROCEDURE — 6360000002 HC RX W HCPCS: Performed by: EMERGENCY MEDICINE

## 2025-06-04 PROCEDURE — 93458 L HRT ARTERY/VENTRICLE ANGIO: CPT | Performed by: INTERNAL MEDICINE

## 2025-06-04 PROCEDURE — 6360000004 HC RX CONTRAST MEDICATION: Performed by: EMERGENCY MEDICINE

## 2025-06-04 PROCEDURE — 7100000010 HC PHASE II RECOVERY - FIRST 15 MIN: Performed by: INTERNAL MEDICINE

## 2025-06-04 PROCEDURE — 71260 CT THORAX DX C+: CPT

## 2025-06-04 PROCEDURE — 80053 COMPREHEN METABOLIC PANEL: CPT

## 2025-06-04 PROCEDURE — 36415 COLL VENOUS BLD VENIPUNCTURE: CPT

## 2025-06-04 PROCEDURE — C1894 INTRO/SHEATH, NON-LASER: HCPCS | Performed by: INTERNAL MEDICINE

## 2025-06-04 PROCEDURE — C1725 CATH, TRANSLUMIN NON-LASER: HCPCS | Performed by: INTERNAL MEDICINE

## 2025-06-04 PROCEDURE — 4A023N7 MEASUREMENT OF CARDIAC SAMPLING AND PRESSURE, LEFT HEART, PERCUTANEOUS APPROACH: ICD-10-PCS | Performed by: INTERNAL MEDICINE

## 2025-06-04 PROCEDURE — 85025 COMPLETE CBC W/AUTO DIFF WBC: CPT

## 2025-06-04 PROCEDURE — 99285 EMERGENCY DEPT VISIT HI MDM: CPT

## 2025-06-04 PROCEDURE — 6360000004 HC RX CONTRAST MEDICATION: Performed by: INTERNAL MEDICINE

## 2025-06-04 PROCEDURE — 84484 ASSAY OF TROPONIN QUANT: CPT

## 2025-06-04 PROCEDURE — 99152 MOD SED SAME PHYS/QHP 5/>YRS: CPT | Performed by: INTERNAL MEDICINE

## 2025-06-04 PROCEDURE — 85730 THROMBOPLASTIN TIME PARTIAL: CPT

## 2025-06-04 PROCEDURE — 33970 AORTIC CIRCULATION ASSIST: CPT | Performed by: INTERNAL MEDICINE

## 2025-06-04 PROCEDURE — 2709999900 HC NON-CHARGEABLE SUPPLY: Performed by: INTERNAL MEDICINE

## 2025-06-04 PROCEDURE — 99222 1ST HOSP IP/OBS MODERATE 55: CPT

## 2025-06-04 PROCEDURE — 6370000000 HC RX 637 (ALT 250 FOR IP): Performed by: EMERGENCY MEDICINE

## 2025-06-04 PROCEDURE — 71046 X-RAY EXAM CHEST 2 VIEWS: CPT

## 2025-06-04 PROCEDURE — 93010 ELECTROCARDIOGRAM REPORT: CPT | Performed by: INTERNAL MEDICINE

## 2025-06-04 PROCEDURE — 99153 MOD SED SAME PHYS/QHP EA: CPT | Performed by: INTERNAL MEDICINE

## 2025-06-04 PROCEDURE — 96374 THER/PROPH/DIAG INJ IV PUSH: CPT

## 2025-06-04 PROCEDURE — C1769 GUIDE WIRE: HCPCS | Performed by: INTERNAL MEDICINE

## 2025-06-04 PROCEDURE — 7100000011 HC PHASE II RECOVERY - ADDTL 15 MIN: Performed by: INTERNAL MEDICINE

## 2025-06-04 PROCEDURE — B215YZZ FLUOROSCOPY OF LEFT HEART USING OTHER CONTRAST: ICD-10-PCS | Performed by: INTERNAL MEDICINE

## 2025-06-04 PROCEDURE — 93005 ELECTROCARDIOGRAM TRACING: CPT | Performed by: EMERGENCY MEDICINE

## 2025-06-04 PROCEDURE — 2100000000 HC CCU R&B

## 2025-06-04 PROCEDURE — 85610 PROTHROMBIN TIME: CPT

## 2025-06-04 PROCEDURE — 51798 US URINE CAPACITY MEASURE: CPT

## 2025-06-04 RX ORDER — HYDRALAZINE HYDROCHLORIDE 20 MG/ML
10 INJECTION INTRAMUSCULAR; INTRAVENOUS EVERY 6 HOURS PRN
Status: DISCONTINUED | OUTPATIENT
Start: 2025-06-04 | End: 2025-06-07

## 2025-06-04 RX ORDER — OLANZAPINE 2.5 MG/1
2.5 TABLET, FILM COATED ORAL NIGHTLY
Status: ON HOLD | COMMUNITY
Start: 2025-01-23 | End: 2025-06-11 | Stop reason: CLARIF

## 2025-06-04 RX ORDER — ACETAMINOPHEN 325 MG/1
650 TABLET ORAL EVERY 4 HOURS PRN
Status: DISCONTINUED | OUTPATIENT
Start: 2025-06-04 | End: 2025-06-07

## 2025-06-04 RX ORDER — NITROGLYCERIN 20 MG/100ML
5-200 INJECTION INTRAVENOUS CONTINUOUS
Status: DISCONTINUED | OUTPATIENT
Start: 2025-06-04 | End: 2025-06-09

## 2025-06-04 RX ORDER — POTASSIUM CHLORIDE 7.45 MG/ML
10 INJECTION INTRAVENOUS PRN
Status: DISCONTINUED | OUTPATIENT
Start: 2025-06-04 | End: 2025-06-07

## 2025-06-04 RX ORDER — SODIUM CHLORIDE 9 MG/ML
INJECTION, SOLUTION INTRAVENOUS PRN
Status: DISCONTINUED | OUTPATIENT
Start: 2025-06-04 | End: 2025-06-04 | Stop reason: HOSPADM

## 2025-06-04 RX ORDER — HEPARIN SODIUM 1000 [USP'U]/ML
2000 INJECTION, SOLUTION INTRAVENOUS; SUBCUTANEOUS PRN
Status: DISCONTINUED | OUTPATIENT
Start: 2025-06-04 | End: 2025-06-08

## 2025-06-04 RX ORDER — HEPARIN SODIUM 10000 [USP'U]/100ML
5-30 INJECTION, SOLUTION INTRAVENOUS CONTINUOUS
Status: DISPENSED | OUTPATIENT
Start: 2025-06-04 | End: 2025-06-06

## 2025-06-04 RX ORDER — ONDANSETRON 4 MG/1
4 TABLET, ORALLY DISINTEGRATING ORAL EVERY 8 HOURS PRN
Status: DISCONTINUED | OUTPATIENT
Start: 2025-06-04 | End: 2025-06-05

## 2025-06-04 RX ORDER — ONDANSETRON 8 MG/1
8 TABLET, FILM COATED ORAL EVERY 8 HOURS PRN
COMMUNITY
Start: 2025-01-23

## 2025-06-04 RX ORDER — ENOXAPARIN SODIUM 100 MG/ML
40 INJECTION SUBCUTANEOUS DAILY
Status: DISCONTINUED | OUTPATIENT
Start: 2025-06-04 | End: 2025-06-05

## 2025-06-04 RX ORDER — LORAZEPAM 0.5 MG/1
0.5 TABLET ORAL
Status: DISCONTINUED | OUTPATIENT
Start: 2025-06-04 | End: 2025-06-04 | Stop reason: HOSPADM

## 2025-06-04 RX ORDER — SODIUM CHLORIDE 0.9 % (FLUSH) 0.9 %
5-40 SYRINGE (ML) INJECTION EVERY 12 HOURS SCHEDULED
Status: DISCONTINUED | OUTPATIENT
Start: 2025-06-04 | End: 2025-06-05

## 2025-06-04 RX ORDER — HEPARIN SODIUM 1000 [USP'U]/ML
4000 INJECTION, SOLUTION INTRAVENOUS; SUBCUTANEOUS PRN
Status: DISCONTINUED | OUTPATIENT
Start: 2025-06-04 | End: 2025-06-08

## 2025-06-04 RX ORDER — SODIUM CHLORIDE 0.9 % (FLUSH) 0.9 %
5-40 SYRINGE (ML) INJECTION PRN
Status: DISCONTINUED | OUTPATIENT
Start: 2025-06-04 | End: 2025-06-05

## 2025-06-04 RX ORDER — ONDANSETRON 2 MG/ML
4 INJECTION INTRAMUSCULAR; INTRAVENOUS EVERY 6 HOURS PRN
Status: DISCONTINUED | OUTPATIENT
Start: 2025-06-04 | End: 2025-06-04 | Stop reason: HOSPADM

## 2025-06-04 RX ORDER — SODIUM CHLORIDE 0.9 % (FLUSH) 0.9 %
5-40 SYRINGE (ML) INJECTION EVERY 12 HOURS SCHEDULED
Status: DISCONTINUED | OUTPATIENT
Start: 2025-06-04 | End: 2025-06-04 | Stop reason: HOSPADM

## 2025-06-04 RX ORDER — SODIUM CHLORIDE 0.9 % (FLUSH) 0.9 %
5-40 SYRINGE (ML) INJECTION PRN
Status: DISCONTINUED | OUTPATIENT
Start: 2025-06-04 | End: 2025-06-07

## 2025-06-04 RX ORDER — IOPAMIDOL 755 MG/ML
75 INJECTION, SOLUTION INTRAVASCULAR
Status: COMPLETED | OUTPATIENT
Start: 2025-06-04 | End: 2025-06-04

## 2025-06-04 RX ORDER — HYDROCODONE BITARTRATE AND ACETAMINOPHEN 5; 325 MG/1; MG/1
1 TABLET ORAL EVERY 6 HOURS PRN
Status: DISCONTINUED | OUTPATIENT
Start: 2025-06-04 | End: 2025-06-05

## 2025-06-04 RX ORDER — SODIUM CHLORIDE 9 MG/ML
INJECTION, SOLUTION INTRAVENOUS PRN
Status: DISCONTINUED | OUTPATIENT
Start: 2025-06-04 | End: 2025-06-07

## 2025-06-04 RX ORDER — POLYETHYLENE GLYCOL 3350 17 G/17G
17 POWDER, FOR SOLUTION ORAL DAILY PRN
Status: DISCONTINUED | OUTPATIENT
Start: 2025-06-04 | End: 2025-06-07

## 2025-06-04 RX ORDER — NITROGLYCERIN 0.4 MG/1
0.4 TABLET SUBLINGUAL EVERY 5 MIN PRN
Status: DISCONTINUED | OUTPATIENT
Start: 2025-06-04 | End: 2025-06-10

## 2025-06-04 RX ORDER — ASPIRIN 325 MG
325 TABLET ORAL ONCE
Status: COMPLETED | OUTPATIENT
Start: 2025-06-04 | End: 2025-06-04

## 2025-06-04 RX ORDER — ACETAMINOPHEN 650 MG/1
650 SUPPOSITORY RECTAL EVERY 6 HOURS PRN
Status: DISCONTINUED | OUTPATIENT
Start: 2025-06-04 | End: 2025-06-06

## 2025-06-04 RX ORDER — PANTOPRAZOLE SODIUM 40 MG/1
40 TABLET, DELAYED RELEASE ORAL
Status: DISCONTINUED | OUTPATIENT
Start: 2025-06-05 | End: 2025-06-07

## 2025-06-04 RX ORDER — NITROGLYCERIN 20 MG/100ML
5-200 INJECTION INTRAVENOUS CONTINUOUS
Status: DISCONTINUED | OUTPATIENT
Start: 2025-06-04 | End: 2025-06-04

## 2025-06-04 RX ORDER — FENTANYL CITRATE 50 UG/ML
INJECTION, SOLUTION INTRAMUSCULAR; INTRAVENOUS PRN
Status: DISCONTINUED | OUTPATIENT
Start: 2025-06-04 | End: 2025-06-04 | Stop reason: HOSPADM

## 2025-06-04 RX ORDER — ALBUTEROL SULFATE 0.83 MG/ML
2.5 SOLUTION RESPIRATORY (INHALATION) EVERY 4 HOURS PRN
Status: DISCONTINUED | OUTPATIENT
Start: 2025-06-04 | End: 2025-06-07

## 2025-06-04 RX ORDER — ONDANSETRON 2 MG/ML
4 INJECTION INTRAMUSCULAR; INTRAVENOUS EVERY 6 HOURS PRN
Status: DISCONTINUED | OUTPATIENT
Start: 2025-06-04 | End: 2025-06-05

## 2025-06-04 RX ORDER — IOPAMIDOL 755 MG/ML
INJECTION, SOLUTION INTRAVASCULAR PRN
Status: DISCONTINUED | OUTPATIENT
Start: 2025-06-04 | End: 2025-06-04 | Stop reason: HOSPADM

## 2025-06-04 RX ORDER — HEPARIN SODIUM 1000 [USP'U]/ML
INJECTION, SOLUTION INTRAVENOUS; SUBCUTANEOUS PRN
Status: DISCONTINUED | OUTPATIENT
Start: 2025-06-04 | End: 2025-06-04 | Stop reason: HOSPADM

## 2025-06-04 RX ORDER — SODIUM CHLORIDE 0.9 % (FLUSH) 0.9 %
5-40 SYRINGE (ML) INJECTION PRN
Status: DISCONTINUED | OUTPATIENT
Start: 2025-06-04 | End: 2025-06-04 | Stop reason: HOSPADM

## 2025-06-04 RX ORDER — SODIUM CHLORIDE 0.9 % (FLUSH) 0.9 %
5-40 SYRINGE (ML) INJECTION EVERY 12 HOURS SCHEDULED
Status: DISCONTINUED | OUTPATIENT
Start: 2025-06-04 | End: 2025-06-07

## 2025-06-04 RX ORDER — ACETAMINOPHEN 325 MG/1
650 TABLET ORAL EVERY 6 HOURS PRN
Status: DISCONTINUED | OUTPATIENT
Start: 2025-06-04 | End: 2025-06-06

## 2025-06-04 RX ORDER — MAGNESIUM SULFATE IN WATER 40 MG/ML
2000 INJECTION, SOLUTION INTRAVENOUS PRN
Status: DISCONTINUED | OUTPATIENT
Start: 2025-06-04 | End: 2025-06-07

## 2025-06-04 RX ORDER — MIDAZOLAM HYDROCHLORIDE 1 MG/ML
INJECTION, SOLUTION INTRAMUSCULAR; INTRAVENOUS PRN
Status: DISCONTINUED | OUTPATIENT
Start: 2025-06-04 | End: 2025-06-04 | Stop reason: HOSPADM

## 2025-06-04 RX ORDER — ASPIRIN 81 MG/1
81 TABLET, CHEWABLE ORAL DAILY
Status: DISCONTINUED | OUTPATIENT
Start: 2025-06-04 | End: 2025-06-05

## 2025-06-04 RX ORDER — POTASSIUM CHLORIDE 1500 MG/1
40 TABLET, EXTENDED RELEASE ORAL PRN
Status: DISCONTINUED | OUTPATIENT
Start: 2025-06-04 | End: 2025-06-07

## 2025-06-04 RX ORDER — HEPARIN SODIUM 1000 [USP'U]/ML
4000 INJECTION, SOLUTION INTRAVENOUS; SUBCUTANEOUS ONCE
Status: COMPLETED | OUTPATIENT
Start: 2025-06-04 | End: 2025-06-04

## 2025-06-04 RX ADMIN — IOPAMIDOL 75 ML: 755 INJECTION, SOLUTION INTRAVENOUS at 09:39

## 2025-06-04 RX ADMIN — ASPIRIN 325 MG: 325 TABLET ORAL at 09:19

## 2025-06-04 RX ADMIN — HEPARIN SODIUM 4000 UNITS: 1000 INJECTION INTRAVENOUS; SUBCUTANEOUS at 21:01

## 2025-06-04 RX ADMIN — Medication 10 ML: at 21:33

## 2025-06-04 RX ADMIN — HEPARIN SODIUM 10 UNITS/KG/HR: 10000 INJECTION, SOLUTION INTRAVENOUS at 21:01

## 2025-06-04 RX ADMIN — HYDROCODONE BITARTRATE AND ACETAMINOPHEN 1 TABLET: 5; 325 TABLET ORAL at 21:32

## 2025-06-04 RX ADMIN — NITROGLYCERIN 5 MCG/MIN: 20 INJECTION INTRAVENOUS at 11:20

## 2025-06-04 RX ADMIN — NITROGLYCERIN 45 MCG/MIN: 20 INJECTION INTRAVENOUS at 18:50

## 2025-06-04 ASSESSMENT — PAIN SCALES - GENERAL
PAINLEVEL_OUTOF10: 4
PAINLEVEL_OUTOF10: 0
PAINLEVEL_OUTOF10: 0
PAINLEVEL_OUTOF10: 2

## 2025-06-04 ASSESSMENT — PAIN DESCRIPTION - DESCRIPTORS: DESCRIPTORS: ACHING

## 2025-06-04 ASSESSMENT — PAIN DESCRIPTION - LOCATION: LOCATION: BACK

## 2025-06-04 NOTE — ED PROVIDER NOTES
Emergency Department Provider Note  Location: [facility] EMERGENCY DEPARTMENT  6/4/2025     Patient Identification  Iris Becerra is a 64 y.o. female    Chief Complaint  Chest Pain (Pt has been on chemotherapy for ovarian cancer. Over the past couple of days she's been having episodes of chest pain. Pt has known blood clots in one of her lungs but pt is unsure which lung. Reports the PA told her the pains could be a side effect from the chemo but wasn't sure. When pt ambulates longer than fifteen to twenty feet she has to stop and rest to catch her breath due to the pain. Pt is due to schedule an echo but doesn't see cardiology until August. Episodes have been coming more frequent)          HPI  (History provided by patient)  Patient is a 64-year-old female who has a history of ovarian cancer is currently receiving chemotherapy and is on Eliquis for her known pulmonary embolism found about 6 weeks ago who presents with 1 month of episodes of exertional dyspnea and now having exertional chest tightness and pressure.  Will get episodes of chest tightness that goes across her chest and into the arms bilaterally down to about the elbow level.  No back pain.  This morning she had an episode at rest when she woke up which was different.  She reports that she feels a bit clammy during these episodes.  Intermittent shortness of breath that is exertional sometimes she can walk without any symptoms and other times she has to stop and is symptomatic.  She took Eliquis this morning at 8 AM.      Nursing Notes were all reviewed and agreed with, or any disagreements were addressed in the HPI:  Allergies:   Allergies   Allergen Reactions    Amlodipine Other (See Comments)     swelling    Crestor [Rosuvastatin] Other (See Comments)     swelling    Lipitor Other (See Comments)     MUSCLE ACHES?    Penicillins      Unsure?    Percocet [Oxycodone-Acetaminophen] Nausea And Vomiting       Past medical history:  has a past

## 2025-06-04 NOTE — PLAN OF CARE
Problem: Pain  Goal: Verbalizes/displays adequate comfort level or baseline comfort level  Outcome: Progressing     Problem: Discharge Planning  Goal: Discharge to home or other facility with appropriate resources  Outcome: Progressing     Problem: Safety - Adult  Goal: Free from fall injury  Outcome: Progressing     Problem: Skin/Tissue Integrity  Goal: Skin integrity remains intact  Description: 1.  Monitor for areas of redness and/or skin breakdown2.  Assess vascular access sites hourly3.  Every 4-6 hours minimum:  Change oxygen saturation probe site4.  Every 4-6 hours:  If on nasal continuous positive airway pressure, respiratory therapy assess nares and determine need for appliance change or resting period  Outcome: Progressing     Problem: ABCDS Injury Assessment  Goal: Absence of physical injury  Outcome: Progressing

## 2025-06-04 NOTE — FLOWSHEET NOTE
Pt arrived to cath lab via transportation from ER 3.  Pt in good spirits,  at bedside.  Nitro infusing at 20 mcg/min to right subclavian port.  No c/o voiced.  Denies pain.  States voided prior to coming over here.

## 2025-06-04 NOTE — ED NOTES
@1023 called cardiology per  Delfino Manzo MD    Re@  NSTEMI, cath?  @1043 Dr. Olvera called back and spoke with Dr. Manzo

## 2025-06-04 NOTE — H&P
sounds.  Musculoskeletal:  No clubbing, cyanosis or edema bilaterally.  Full range of motion without deformity.  Neurologic:  Neurovascularly intact without any focal sensory/motor deficits.   Psychiatric:  Alert and oriented, thought content appropriate, normal insight  Skin:  Skin color, texture, turgor normal.  No rashes or lesions.  Capillary Refill:  Brisk,< 3 seconds   Peripheral Pulses:  +2 palpable, equal bilaterally     BP (!) 152/84   Pulse 57   Temp 98 °F (36.7 °C) (Oral)   Resp 11   Ht 1.651 m (5' 5\")   Wt 98.2 kg (216 lb 6.4 oz)   LMP  (LMP Unknown)   SpO2 98%   BMI 36.01 kg/m²     Diet: []Adult/General  []Cardiac  []Diabetic  []Low Fat  [x]NPO  []NPO after Midnight  []Other     DVT Prophylaxis: PPX dose LMWH    Code status: [x]Full  []DNR/CCA  []Limited (DNR/CCA with Do Not Intubate)  []DNRCC    Surrogate Decision Maker:   Name Home Phone Work Phone Mobile Phone Relationship Lgl ERVIN Garcia [phone] Spouse    JONES SANDOVAL [phone]   Child         PT/OT Eval Status: Not yet ordered    Anticipated Discharge Day/Date:  6/6/25    Anticipated Discharge Location: Home    Consults:      IP CONSULT TO CARDIOLOGY  IP CONSULT TO CARDIOLOGY  IP CONSULT TO HOSPITALIST  IP CONSULT TO ONCOLOGY  IP CONSULT TO PALLIATIVE CARE    I personally have obtained, updated and/or reviewed the patient’s medication list on 6/4/2025   --------------------------------------------------------------------------------------------------------------------------------------------------------------------    Completed 17 minutes of advanced care planning for goals of care and code status discussion. Full code. Palliative care team consulted in setting of ovarian cancer and multiple comorbid conditions. Family, patient look forward to completion of discussion with team.    Imaging:     CT CHEST PULMONARY EMBOLISM W CONTRAST  Result Date: 6/4/2025  EXAM: CT CHEST PULMONARY EMBOLISM W

## 2025-06-05 ENCOUNTER — APPOINTMENT (OUTPATIENT)
Dept: VASCULAR LAB | Age: 65
DRG: 233 | End: 2025-06-05
Payer: MEDICARE

## 2025-06-05 ENCOUNTER — APPOINTMENT (OUTPATIENT)
Age: 65
DRG: 233 | End: 2025-06-05
Payer: MEDICARE

## 2025-06-05 LAB
ABO/RH: NORMAL
ANION GAP SERPL CALCULATED.3IONS-SCNC: 11 MMOL/L (ref 3–16)
ANTIBODY SCREEN: NORMAL
APTT BLD: 83.6 SEC (ref 22.1–36.4)
APTT BLD: 85.8 SEC (ref 22.1–36.4)
BACTERIA URNS QL MICRO: ABNORMAL /HPF
BASE EXCESS BLDA CALC-SCNC: 3 MMOL/L (ref -3–3)
BASOPHILS # BLD: 0 K/UL (ref 0–0.2)
BASOPHILS NFR BLD: 1 %
BILIRUB UR QL STRIP.AUTO: NEGATIVE
BUN SERPL-MCNC: 21 MG/DL (ref 7–20)
CA-I BLD-SCNC: 1.2 MMOL/L (ref 1.12–1.32)
CALCIUM SERPL-MCNC: 8.5 MG/DL (ref 8.3–10.6)
CHLORIDE SERPL-SCNC: 101 MMOL/L (ref 99–110)
CLARITY UR: CLEAR
CO2 BLDA-SCNC: 28 MMOL/L
CO2 SERPL-SCNC: 25 MMOL/L (ref 21–32)
COLOR UR: YELLOW
CREAT SERPL-MCNC: 0.9 MG/DL (ref 0.6–1.2)
DEPRECATED RDW RBC AUTO: 20.1 % (ref 12.4–15.4)
ECHO AO ASC DIAM: 3 CM
ECHO AO ASCENDING AORTA INDEX: 1.46 CM/M2
ECHO AO ROOT DIAM: 2.7 CM
ECHO AO ROOT INDEX: 1.32 CM/M2
ECHO AV AREA PEAK VELOCITY: 1.8 CM2
ECHO AV AREA VTI: 1.6 CM2
ECHO AV AREA/BSA PEAK VELOCITY: 0.9 CM2/M2
ECHO AV AREA/BSA VTI: 0.8 CM2/M2
ECHO AV MEAN GRADIENT: 10 MMHG
ECHO AV MEAN VELOCITY: 1.4 M/S
ECHO AV PEAK GRADIENT: 20 MMHG
ECHO AV PEAK VELOCITY: 2.2 M/S
ECHO AV VELOCITY RATIO: 0.64
ECHO AV VTI: 43.3 CM
ECHO BSA: 2.12 M2
ECHO LA AREA 2C: 19.2 CM2
ECHO LA AREA 4C: 21.8 CM2
ECHO LA DIAMETER INDEX: 1.95 CM/M2
ECHO LA DIAMETER: 4 CM
ECHO LA MAJOR AXIS: 7.2 CM
ECHO LA MINOR AXIS: 6.4 CM
ECHO LA TO AORTIC ROOT RATIO: 1.48
ECHO LA VOL BP: 53 ML (ref 22–52)
ECHO LA VOL MOD A2C: 47 ML (ref 22–52)
ECHO LA VOL MOD A4C: 53 ML (ref 22–52)
ECHO LA VOL/BSA BIPLANE: 26 ML/M2 (ref 16–34)
ECHO LA VOLUME INDEX MOD A2C: 23 ML/M2 (ref 16–34)
ECHO LA VOLUME INDEX MOD A4C: 26 ML/M2 (ref 16–34)
ECHO LV E' LATERAL VELOCITY: 7.89 CM/S
ECHO LV E' SEPTAL VELOCITY: 5.26 CM/S
ECHO LV EF PHYSICIAN: 60 %
ECHO LV FRACTIONAL SHORTENING: 42 % (ref 28–44)
ECHO LV GLOBAL LONGITUDINAL STRAIN (GLS): -13.4 %
ECHO LV INTERNAL DIMENSION DIASTOLE INDEX: 2.34 CM/M2
ECHO LV INTERNAL DIMENSION DIASTOLIC: 4.8 CM (ref 3.9–5.3)
ECHO LV INTERNAL DIMENSION SYSTOLIC INDEX: 1.37 CM/M2
ECHO LV INTERNAL DIMENSION SYSTOLIC: 2.8 CM
ECHO LV IVSD: 0.9 CM (ref 0.6–0.9)
ECHO LV MASS 2D: 147.8 G (ref 67–162)
ECHO LV MASS INDEX 2D: 72.1 G/M2 (ref 43–95)
ECHO LV POSTERIOR WALL DIASTOLIC: 0.9 CM (ref 0.6–0.9)
ECHO LV RELATIVE WALL THICKNESS RATIO: 0.38
ECHO LVOT AREA: 2.8 CM2
ECHO LVOT AV VTI INDEX: 0.58
ECHO LVOT DIAM: 1.9 CM
ECHO LVOT MEAN GRADIENT: 4 MMHG
ECHO LVOT PEAK GRADIENT: 8 MMHG
ECHO LVOT PEAK VELOCITY: 1.4 M/S
ECHO LVOT STROKE VOLUME INDEX: 34.6 ML/M2
ECHO LVOT SV: 70.8 ML
ECHO LVOT VTI: 25 CM
ECHO MV A VELOCITY: 1.04 M/S
ECHO MV E DECELERATION TIME (DT): 243 MS
ECHO MV E VELOCITY: 0.7 M/S
ECHO MV E/A RATIO: 0.67
ECHO MV E/E' LATERAL: 8.87
ECHO MV E/E' RATIO (AVERAGED): 11.09
ECHO MV E/E' SEPTAL: 13.31
ECHO PV MAX VELOCITY: 1.5 M/S
ECHO PV PEAK GRADIENT: 8 MMHG
ECHO RA AREA 4C: 10.5 CM2
ECHO RA END SYSTOLIC VOLUME APICAL 4 CHAMBER INDEX BSA: 8 ML/M2
ECHO RA VOLUME: 17 ML
ECHO RV BASAL DIMENSION: 3.4 CM
ECHO RV FREE WALL PEAK S': 19.3 CM/S
ECHO RV LONGITUDINAL DIMENSION: 8.1 CM
ECHO RV MID DIMENSION: 3.2 CM
ECHO RV TAPSE: 2.5 CM (ref 1.7–?)
EOSINOPHIL # BLD: 0 K/UL (ref 0–0.6)
EOSINOPHIL NFR BLD: 1.2 %
EPI CELLS #/AREA URNS HPF: ABNORMAL /HPF (ref 0–5)
GFR SERPLBLD CREATININE-BSD FMLA CKD-EPI: 70 ML/MIN/{1.73_M2}
GLUCOSE BLD-MCNC: 114 MG/DL (ref 70–99)
GLUCOSE SERPL-MCNC: 110 MG/DL (ref 70–99)
GLUCOSE UR STRIP.AUTO-MCNC: NEGATIVE MG/DL
HCO3 BLDA-SCNC: 27.1 MMOL/L (ref 21–29)
HCT VFR BLD AUTO: 28 % (ref 36–48)
HCT VFR BLD AUTO: 28 % (ref 36–48)
HGB BLD CALC-MCNC: 9.6 GM/DL (ref 12–16)
HGB BLD-MCNC: 9.4 G/DL (ref 12–16)
HGB UR QL STRIP.AUTO: NEGATIVE
KETONES UR STRIP.AUTO-MCNC: NEGATIVE MG/DL
LACTATE BLD-SCNC: 1.39 MMOL/L (ref 0.4–2)
LEUKOCYTE ESTERASE UR QL STRIP.AUTO: ABNORMAL
LYMPHOCYTES # BLD: 0.6 K/UL (ref 1–5.1)
LYMPHOCYTES NFR BLD: 22.4 %
MCH RBC QN AUTO: 32.9 PG (ref 26–34)
MCHC RBC AUTO-ENTMCNC: 33.4 G/DL (ref 31–36)
MCV RBC AUTO: 98.3 FL (ref 80–100)
MONOCYTES # BLD: 0.2 K/UL (ref 0–1.3)
MONOCYTES NFR BLD: 7.4 %
NEUTROPHILS # BLD: 2 K/UL (ref 1.7–7.7)
NEUTROPHILS NFR BLD: 68 %
NITRITE UR QL STRIP.AUTO: NEGATIVE
PCO2 BLDA: 38.5 MM HG (ref 35–45)
PERFORMED ON: ABNORMAL
PH BLDA: 7.46 [PH] (ref 7.35–7.45)
PH UR STRIP.AUTO: 6.5 [PH] (ref 5–8)
PLATELET # BLD AUTO: 140 K/UL (ref 135–450)
PMV BLD AUTO: 8.1 FL (ref 5–10.5)
PO2 BLDA: 78 MM HG (ref 75–108)
POC SAMPLE TYPE: ABNORMAL
POTASSIUM BLD-SCNC: 4.2 MMOL/L (ref 3.5–5.1)
POTASSIUM SERPL-SCNC: 4.5 MMOL/L (ref 3.5–5.1)
PROT UR STRIP.AUTO-MCNC: NEGATIVE MG/DL
RBC # BLD AUTO: 2.85 M/UL (ref 4–5.2)
RBC #/AREA URNS HPF: ABNORMAL /HPF (ref 0–4)
SAO2 % BLDA: 96 % (ref 93–100)
SODIUM BLD-SCNC: 141 MMOL/L (ref 136–145)
SODIUM SERPL-SCNC: 137 MMOL/L (ref 136–145)
SP GR UR STRIP.AUTO: 1.01 (ref 1–1.03)
UA COMPLETE W REFLEX CULTURE PNL UR: ABNORMAL
UA DIPSTICK W REFLEX MICRO PNL UR: YES
URN SPEC COLLECT METH UR: ABNORMAL
UROBILINOGEN UR STRIP-ACNC: 0.2 E.U./DL
VAS LEFT CCA DIST EDV: 26 CM/S
VAS LEFT CCA DIST PSV: 72.4 CM/S
VAS LEFT CCA MID EDV: 22.8 CM/S
VAS LEFT CCA MID PSV: 96 CM/S
VAS LEFT CCA PROX EDV: 19.7 CM/S
VAS LEFT CCA PROX PSV: 86.6 CM/S
VAS LEFT ECA EDV: 15.8 CM/S
VAS LEFT ECA PSV: 113 CM/S
VAS LEFT GSV ANKLE DIAM: 1.43 MM
VAS LEFT GSV AT KNEE DIAM: 4.01 MM
VAS LEFT GSV BK DIST DIAM: 1.74 MM
VAS LEFT GSV BK MID DIAM: 1.46 MM
VAS LEFT GSV BK PROX DIAM: 1.46 MM
VAS LEFT GSV JUNC DIAM: 4.31 MM
VAS LEFT GSV THIGH DIST DIAM: 3.65 MM
VAS LEFT GSV THIGH MID DIAM: 4.08 MM
VAS LEFT GSV THIGH PROX DIAM: 3.47 MM
VAS LEFT ICA DIST EDV: 42.3 CM/S
VAS LEFT ICA DIST PSV: 80.7 CM/S
VAS LEFT ICA MID EDV: 33.1 CM/S
VAS LEFT ICA MID PSV: 71.9 CM/S
VAS LEFT ICA PROX EDV: 22.9 CM/S
VAS LEFT ICA PROX PSV: 59.2 CM/S
VAS LEFT ICA/CCA PSV: 0.84
VAS LEFT SUBCLAVIAN PROX EDV: 0 CM/S
VAS LEFT SUBCLAVIAN PROX PSV: 201 CM/S
VAS LEFT VERTEBRAL EDV: 18 CM/S
VAS LEFT VERTEBRAL PSV: 42.6 CM/S
VAS RIGHT CCA DIST EDV: 23.6 CM/S
VAS RIGHT CCA DIST PSV: 63.4 CM/S
VAS RIGHT CCA MID EDV: 21.7 CM/S
VAS RIGHT CCA MID PSV: 76.4 CM/S
VAS RIGHT CCA PROX EDV: 21.7 CM/S
VAS RIGHT CCA PROX PSV: 110 CM/S
VAS RIGHT ECA EDV: 11 CM/S
VAS RIGHT ECA PSV: 110 CM/S
VAS RIGHT GSV ANKLE DIAM: 1.83 MM
VAS RIGHT GSV AT KNEE DIAM: 4.01 MM
VAS RIGHT GSV BK DIST DIAM: 1.6 MM
VAS RIGHT GSV BK MID DIAM: 2.1 MM
VAS RIGHT GSV BK PROX DIAM: 1.91 MM
VAS RIGHT GSV JUNC DIAM: 9.4 MM
VAS RIGHT GSV THIGH DIST DIAM: 2.67 MM
VAS RIGHT GSV THIGH MID DIAM: 3.28 MM
VAS RIGHT GSV THIGH PROX DIAM: 3.3 MM
VAS RIGHT ICA DIST EDV: 26.7 CM/S
VAS RIGHT ICA DIST PSV: 65.4 CM/S
VAS RIGHT ICA MID EDV: 23.9 CM/S
VAS RIGHT ICA MID PSV: 49.5 CM/S
VAS RIGHT ICA PROX EDV: 19.3 CM/S
VAS RIGHT ICA PROX PSV: 67.7 CM/S
VAS RIGHT ICA/CCA PSV: 0.89
VAS RIGHT SUBCLAVIAN PROX EDV: 0 CM/S
VAS RIGHT SUBCLAVIAN PROX PSV: 176 CM/S
VAS RIGHT VERTEBRAL EDV: 14.3 CM/S
VAS RIGHT VERTEBRAL PSV: 29.3 CM/S
WBC # BLD AUTO: 2.9 K/UL (ref 4–11)
WBC #/AREA URNS HPF: ABNORMAL /HPF (ref 0–5)

## 2025-06-05 PROCEDURE — 84132 ASSAY OF SERUM POTASSIUM: CPT

## 2025-06-05 PROCEDURE — 86901 BLOOD TYPING SEROLOGIC RH(D): CPT

## 2025-06-05 PROCEDURE — 85025 COMPLETE CBC W/AUTO DIFF WBC: CPT

## 2025-06-05 PROCEDURE — 30233N1 TRANSFUSION OF NONAUTOLOGOUS RED BLOOD CELLS INTO PERIPHERAL VEIN, PERCUTANEOUS APPROACH: ICD-10-PCS

## 2025-06-05 PROCEDURE — 93880 EXTRACRANIAL BILAT STUDY: CPT | Performed by: SURGERY

## 2025-06-05 PROCEDURE — 83605 ASSAY OF LACTIC ACID: CPT

## 2025-06-05 PROCEDURE — 76376 3D RENDER W/INTRP POSTPROCES: CPT | Performed by: INTERNAL MEDICINE

## 2025-06-05 PROCEDURE — 86850 RBC ANTIBODY SCREEN: CPT

## 2025-06-05 PROCEDURE — 93880 EXTRACRANIAL BILAT STUDY: CPT

## 2025-06-05 PROCEDURE — 85014 HEMATOCRIT: CPT

## 2025-06-05 PROCEDURE — 82803 BLOOD GASES ANY COMBINATION: CPT

## 2025-06-05 PROCEDURE — P9016 RBC LEUKOCYTES REDUCED: HCPCS

## 2025-06-05 PROCEDURE — 86923 COMPATIBILITY TEST ELECTRIC: CPT

## 2025-06-05 PROCEDURE — 6370000000 HC RX 637 (ALT 250 FOR IP)

## 2025-06-05 PROCEDURE — 82947 ASSAY GLUCOSE BLOOD QUANT: CPT

## 2025-06-05 PROCEDURE — 36592 COLLECT BLOOD FROM PICC: CPT

## 2025-06-05 PROCEDURE — 6360000002 HC RX W HCPCS

## 2025-06-05 PROCEDURE — 82330 ASSAY OF CALCIUM: CPT

## 2025-06-05 PROCEDURE — 84295 ASSAY OF SERUM SODIUM: CPT

## 2025-06-05 PROCEDURE — 2500000003 HC RX 250 WO HCPCS

## 2025-06-05 PROCEDURE — 6370000000 HC RX 637 (ALT 250 FOR IP): Performed by: HOSPITALIST

## 2025-06-05 PROCEDURE — 6360000002 HC RX W HCPCS: Performed by: THORACIC SURGERY (CARDIOTHORACIC VASCULAR SURGERY)

## 2025-06-05 PROCEDURE — 86900 BLOOD TYPING SEROLOGIC ABO: CPT

## 2025-06-05 PROCEDURE — 83036 HEMOGLOBIN GLYCOSYLATED A1C: CPT

## 2025-06-05 PROCEDURE — 2100000000 HC CCU R&B

## 2025-06-05 PROCEDURE — 81001 URINALYSIS AUTO W/SCOPE: CPT

## 2025-06-05 PROCEDURE — 85730 THROMBOPLASTIN TIME PARTIAL: CPT

## 2025-06-05 PROCEDURE — 93970 EXTREMITY STUDY: CPT | Performed by: SURGERY

## 2025-06-05 PROCEDURE — 93970 EXTREMITY STUDY: CPT

## 2025-06-05 PROCEDURE — 93306 TTE W/DOPPLER COMPLETE: CPT | Performed by: INTERNAL MEDICINE

## 2025-06-05 PROCEDURE — 99291 CRITICAL CARE FIRST HOUR: CPT | Performed by: INTERNAL MEDICINE

## 2025-06-05 PROCEDURE — 99233 SBSQ HOSP IP/OBS HIGH 50: CPT

## 2025-06-05 PROCEDURE — 80048 BASIC METABOLIC PNL TOTAL CA: CPT

## 2025-06-05 PROCEDURE — P9035 PLATELET PHERES LEUKOREDUCED: HCPCS

## 2025-06-05 PROCEDURE — 76376 3D RENDER W/INTRP POSTPROCES: CPT

## 2025-06-05 PROCEDURE — 93356 MYOCRD STRAIN IMG SPCKL TRCK: CPT | Performed by: INTERNAL MEDICINE

## 2025-06-05 RX ORDER — OXYCODONE HYDROCHLORIDE 5 MG/1
5 TABLET ORAL EVERY 4 HOURS PRN
Refills: 0 | Status: DISCONTINUED | OUTPATIENT
Start: 2025-06-05 | End: 2025-06-07

## 2025-06-05 RX ORDER — SODIUM CHLORIDE 0.9 % (FLUSH) 0.9 %
5-40 SYRINGE (ML) INJECTION EVERY 12 HOURS SCHEDULED
Status: DISCONTINUED | OUTPATIENT
Start: 2025-06-05 | End: 2025-06-05

## 2025-06-05 RX ORDER — MUPIROCIN 2 %
OINTMENT (GRAM) TOPICAL 2 TIMES DAILY
Status: DISCONTINUED | OUTPATIENT
Start: 2025-06-05 | End: 2025-06-06 | Stop reason: SDUPTHER

## 2025-06-05 RX ORDER — PROCHLORPERAZINE EDISYLATE 5 MG/ML
10 INJECTION INTRAMUSCULAR; INTRAVENOUS EVERY 6 HOURS PRN
Status: DISCONTINUED | OUTPATIENT
Start: 2025-06-05 | End: 2025-06-07

## 2025-06-05 RX ORDER — ONDANSETRON 4 MG/1
4 TABLET, ORALLY DISINTEGRATING ORAL EVERY 6 HOURS PRN
Status: DISCONTINUED | OUTPATIENT
Start: 2025-06-05 | End: 2025-06-07

## 2025-06-05 RX ORDER — OXYCODONE HYDROCHLORIDE 5 MG/1
10 TABLET ORAL EVERY 4 HOURS PRN
Refills: 0 | Status: DISCONTINUED | OUTPATIENT
Start: 2025-06-05 | End: 2025-06-07

## 2025-06-05 RX ORDER — ACETAMINOPHEN 500 MG
1000 TABLET ORAL ONCE
Status: COMPLETED | OUTPATIENT
Start: 2025-06-06 | End: 2025-06-06

## 2025-06-05 RX ORDER — HEPARIN 100 UNIT/ML
100 SYRINGE INTRAVENOUS PRN
Status: DISCONTINUED | OUTPATIENT
Start: 2025-06-05 | End: 2025-06-12 | Stop reason: HOSPADM

## 2025-06-05 RX ORDER — SODIUM CHLORIDE, SODIUM LACTATE, POTASSIUM CHLORIDE, CALCIUM CHLORIDE 600; 310; 30; 20 MG/100ML; MG/100ML; MG/100ML; MG/100ML
INJECTION, SOLUTION INTRAVENOUS CONTINUOUS
Status: DISCONTINUED | OUTPATIENT
Start: 2025-06-06 | End: 2025-06-06 | Stop reason: SDUPTHER

## 2025-06-05 RX ORDER — ONDANSETRON 2 MG/ML
4 INJECTION INTRAMUSCULAR; INTRAVENOUS EVERY 6 HOURS PRN
Status: DISCONTINUED | OUTPATIENT
Start: 2025-06-05 | End: 2025-06-07

## 2025-06-05 RX ORDER — ASPIRIN 81 MG/1
81 TABLET ORAL ONCE
Status: DISCONTINUED | OUTPATIENT
Start: 2025-06-05 | End: 2025-06-05

## 2025-06-05 RX ADMIN — HEPARIN SODIUM 10 UNITS/KG/HR: 10000 INJECTION, SOLUTION INTRAVENOUS at 22:38

## 2025-06-05 RX ADMIN — ONDANSETRON 4 MG: 2 INJECTION, SOLUTION INTRAMUSCULAR; INTRAVENOUS at 03:11

## 2025-06-05 RX ADMIN — NITROGLYCERIN 50 MCG/MIN: 20 INJECTION INTRAVENOUS at 22:39

## 2025-06-05 RX ADMIN — Medication 10 ML: at 21:07

## 2025-06-05 RX ADMIN — MUPIROCIN: 20 OINTMENT TOPICAL at 21:07

## 2025-06-05 RX ADMIN — ONDANSETRON 4 MG: 2 INJECTION, SOLUTION INTRAMUSCULAR; INTRAVENOUS at 16:18

## 2025-06-05 RX ADMIN — Medication 10 ML: at 08:18

## 2025-06-05 RX ADMIN — OXYCODONE 10 MG: 5 TABLET ORAL at 16:23

## 2025-06-05 RX ADMIN — NITROGLYCERIN 55 MCG/MIN: 20 INJECTION INTRAVENOUS at 06:04

## 2025-06-05 RX ADMIN — MUPIROCIN: 20 OINTMENT TOPICAL at 10:55

## 2025-06-05 RX ADMIN — OXYCODONE 5 MG: 5 TABLET ORAL at 03:11

## 2025-06-05 RX ADMIN — OXYCODONE 5 MG: 5 TABLET ORAL at 21:18

## 2025-06-05 RX ADMIN — ASPIRIN 81 MG: 81 TABLET, CHEWABLE ORAL at 10:55

## 2025-06-05 RX ADMIN — PANTOPRAZOLE SODIUM 40 MG: 40 TABLET, DELAYED RELEASE ORAL at 10:55

## 2025-06-05 ASSESSMENT — PAIN SCALES - GENERAL
PAINLEVEL_OUTOF10: 2
PAINLEVEL_OUTOF10: 4
PAINLEVEL_OUTOF10: 5
PAINLEVEL_OUTOF10: 8
PAINLEVEL_OUTOF10: 6
PAINLEVEL_OUTOF10: 1

## 2025-06-05 ASSESSMENT — PAIN DESCRIPTION - DESCRIPTORS
DESCRIPTORS: ACHING;SORE
DESCRIPTORS: ACHING;SORE
DESCRIPTORS: ACHING

## 2025-06-05 ASSESSMENT — PAIN DESCRIPTION - ORIENTATION
ORIENTATION: LOWER;MID
ORIENTATION: LOWER;MID

## 2025-06-05 ASSESSMENT — PAIN DESCRIPTION - LOCATION
LOCATION: HEAD
LOCATION: BACK

## 2025-06-05 ASSESSMENT — PAIN - FUNCTIONAL ASSESSMENT: PAIN_FUNCTIONAL_ASSESSMENT: ACTIVITIES ARE NOT PREVENTED

## 2025-06-05 NOTE — PLAN OF CARE
Problem: Pain  Goal: Verbalizes/displays adequate comfort level or baseline comfort level  6/5/2025 0737 by Tr Collins RN  Outcome: Progressing  6/4/2025 2008 by Griffin Burton RN  Outcome: Progressing  6/4/2025 1813 by Tr Collins RN  Outcome: Progressing     Problem: Discharge Planning  Goal: Discharge to home or other facility with appropriate resources  6/5/2025 0737 by Tr Collins RN  Outcome: Progressing  6/4/2025 2008 by Griffin Burton RN  Outcome: Progressing  Flowsheets (Taken 6/4/2025 2000)  Discharge to home or other facility with appropriate resources: Identify barriers to discharge with patient and caregiver  6/4/2025 1813 by Tr Collins RN  Outcome: Progressing     Problem: Safety - Adult  Goal: Free from fall injury  6/5/2025 0737 by Tr Collins RN  Outcome: Progressing  6/4/2025 2008 by Griffin Burton RN  Outcome: Progressing  6/4/2025 1813 by Tr Collins RN  Outcome: Progressing     Problem: Skin/Tissue Integrity  Goal: Skin integrity remains intact  Description: 1.  Monitor for areas of redness and/or skin breakdown2.  Assess vascular access sites hourly3.  Every 4-6 hours minimum:  Change oxygen saturation probe site4.  Every 4-6 hours:  If on nasal continuous positive airway pressure, respiratory therapy assess nares and determine need for appliance change or resting period  6/5/2025 0737 by Tr Collins RN  Outcome: Progressing  6/4/2025 2008 by Griffin Burton RN  Outcome: Progressing  Flowsheets (Taken 6/4/2025 2000)  Skin Integrity Remains Intact:   Monitor for areas of redness and/or skin breakdown   Assess vascular access sites hourly   Every 4-6 hours minimum:  Change oxygen saturation probe site   Turn and reposition as indicated   Assess need for specialty bed   Positioning devices   Pressure redistribution bed/mattress (bed type)   Check visual cues for pain   Monitor skin under medical devices  6/4/2025 1813 by Tr Collins RN  Outcome: Progressing

## 2025-06-05 NOTE — CONSULTS
Palliative Care Initial Note  Palliative Care Admit date: 6/5/25    ACP/palcare referral for GOC per hospitalist.  Chart reviewed, s/p LHC w/ balloon pump, CT surgery evaluating for CABG.  Pt w/ relapse of ovarian cancer w/ subsequent resumption of treatment.  Noted in care everywhere mention of palliative care but could not discern if community palliative care is actually involved w/ pt.  No AD's in this EMR.  Will plan f/u w/ pt/spouse to assess their support needs, can coordinate community palcare, if desired, and arrange AD completion if pt hasn't completed PTA.    
    Pharmacy to Manage Heparin Infusion per Hospital Nomogram    Dx: CAD/STEMI/NSTEMI/UA,AFib  Pt wt = 98.2 kg (actual weight)  Baseline aPTT = 79.8 sec at  1730    Oral factor Xa-inhibitors may alter and elevate anti-Xa levels used for unfractionated heparin monitoring. As a result, anti-Xa monitoring is not accurate while Xa-inhibitor activity is detectable. Utilize aPTT monitoring when patient received an oral factor Xa-inhibitor (apixaban, betrixaban, edoxaban or rivaroxaban) within 72 hours prior to admission (please document last administration time). The goal is to allow a washout of oral factor Xa-inhibitors by using aPTT for 72 hours, then change to ant-Xa levels for UFH.     PT received a dose of Apixaban 6/4/25 at 0900  Will obtain aPTT for heparin infusion adjustment until 6/7/25 after 0900.    Heparin (weight-based) Infusion: CAD/STEMI/NSTEMI/UA/AFib)   Heparin 60 units/kg IVP bolus (max 4,000 units) followed by Heparin infusion at 10 units/kg/hr (recommended max initial rate: 1000 units/hr).  Recheck anti-Xa (unless aPTT being used) in 6 hours.  Goal aPTT =  seconds.    Linnea Garzon [facility]    ---------------------------------------  6/5 0345  Low-Dose Heparin Drip  Current Rate: 10 units/kg/hr  6/5 @ 0305 aPTT Level= 83.6  Plan: Per pharmacy dosing, we will not make any changes at this time    Next aPTT Level: 6/5 @ 0900  Angela Francois, PharmD 6/5/2025 3:44 AM    aPTT 85.8 seconds at 0859.  Heparin at 10 units/kg/hr.  Recheck aPTT tomorrow AM.  Dani JacintoD, BCPS   6/5/2025 9:41 AM    aPTT 87.8 seconds at 0615.  Heparin at 10 units/kg/hr  aPTT tomorrow AM  Dani JacintoD, BCPS   6/6/2025 9:01 AM    6/6  - OR was rescheduled to 6/7am , will order aPTT X1 now, resume drip at previous rate 10units/kg/hr , bolus based on aPTT.  - Stop drip at 10am on 6/7am.  - PLT= 98K    Ya Alfonso/[facility]. 6/6/25 3:24 PM EDT    ---------------------------------  6/7 0505  Low-Dose Heparin 
ONC CONSULT PLACED @ 4718 6-4-25  
exam deferred at this time due to patient on cath lab table at time of consult     Vitals  Afebrile  HR 54-81 NSR  -196  SPO2 94-99% on room air    Ht 165.1 cm  Wt 98.2 kg  BMI 36.01    Home cardiac meds:   Eliquis 5 mg BID  HCTZ 12.5 mg daily  Simvastatin 10 mg nightly    Home non cardiac meds:   Zyprexa 2.5 mg nightly  Sumatriptan     Inpatient  Cardiac Meds  bASA    Non Cardiac Meds  Lovenox  protonix    Data  CBC:   Recent Labs     06/04/25  0851   WBC 3.4*   HGB 11.5*   HCT 34.9*   .5*        BMP:   Recent Labs     06/04/25  0851      K 4.6      CO2 23   BUN 29*   CREATININE 1.1   GLUCOSE 94     CXR 6/4/25    IMPRESSION:  No acute cardiopulmonary abnormality.    EKG 6/4/25 0834- NSR w/ inferior and anterolateral ST depression    Echo 5/28/24    Left Ventricle: Normal left ventricular systolic function with a visually estimated EF of 55 - 60%. Left ventricle size is normal. Normal wall thickness. Normal wall motion. Normal diastolic function.    Aortic Valve: Mild regurgitation.    Mitral Valve: Trace regurgitation.    Tricuspid Valve: Trace-mild regurgitation. The estimated RVSP is 35 mmHg.    CT Chest PE 6/4/25  Heart/Great Vessels: Normal.   1. Small volume bilateral pulmonary emboli without sequelae of right heart  strain.  2. Groundglass opacities bilaterally which may represent sequelae of air  trapping or mild edema.    [facility] 6/4/25 w/ Dr. Perry: severe ostial left main disease    Assessment and Plan  CHADS-VASC score: 5  Age 1  Sex: 1  CHF Hx: 0  HTN Hx: 1  Stroke/TIA/VTE Hx: 2  Vascular disease Hx: 0  DM Hx: 0    STS surgical risk score  Procedure Type: Isolated CABG   Perioperative Outcome Estimate %   Operative Mortality 1.81%   Morbidity & Mortality 7.31%   Stroke 0.89%   Renal Failure 1.36%   Reoperation 1.81%   Prolonged Ventilation 3.68%   Deep Sternal Wound Infection 0.426%   Long Hospital Stay (>14 days) 3.72%   Short Hospital Stay (<6 days)* 40.5%     Clinical 
education: Not on file    Highest education level: Not on file   Occupational History    Not on file   Tobacco Use    Smoking status: Never    Smokeless tobacco: Never   Vaping Use    Vaping status: Never Used   Substance and Sexual Activity    Alcohol use: No    Drug use: No    Sexual activity: Not on file   Other Topics Concern    Not on file   Social History Narrative    Not on file     Social Drivers of Health     Financial Resource Strain: Low Risk  (4/17/2024)    Overall Financial Resource Strain (CARDIA)     Difficulty of Paying Living Expenses: Not hard at all   Food Insecurity: No Food Insecurity (6/4/2025)    Hunger Vital Sign     Worried About Running Out of Food in the Last Year: Never true     Ran Out of Food in the Last Year: Never true   Transportation Needs: No Transportation Needs (6/4/2025)    PRAPARE - Transportation     Lack of Transportation (Medical): No     Lack of Transportation (Non-Medical): No   Physical Activity: Inactive (4/17/2025)    Exercise Vital Sign     Days of Exercise per Week: 0 days     Minutes of Exercise per Session: 0 min   Stress: Not on file   Social Connections: Not on file   Intimate Partner Violence: Unknown (1/19/2024)    Received from [facility], [facility]    Interpersonal Safety     Feel physically or emotionally unsafe where currently live: Not on file     Harm by anyone: Not on file     Emotionally Harmed: Not on file   Housing Stability: Low Risk  (6/4/2025)    Housing Stability Vital Sign     Unable to Pay for Housing in the Last Year: No     Number of Times Moved in the Last Year: 0     Homeless in the Last Year: No          Family History:  Family History   Problem Relation Age of Onset    Diabetes Mother     High Blood Pressure Mother     Cancer Mother         UTERINE W/METS BRAIN    COPD Father     Asthma Sister     Thyroid Disease Sister     Heart Disease Sister     Sleep Apnea Sister     Thyroid 
treatment were discussed.     Tobacco use was discussed with the patient and educated on the negative effects.    Thank you for allowing to us to participate in the care or Iris Becerra. Please call with questions.    Due to the high probability of clinically significant life threating deterioration of the patient's condition that required my urgent intervention, a total critical care time of 38 minutes was used. This time excludes any time that may have been spent performing procedures. This includes but not limited to vital sign monitoring, telemetry monitoring, continuous pulse oximety, IV medication, clinical response to the IV medications, documentation time , consultation time, interpretation of lab data, review of nursing notes and old record review.      Gomez Perry MD, FAC, [facility]  Interventional Cardiology  [facility]  6/4/2025  [phone]      Inadvertent computerized transcription errors may be present

## 2025-06-05 NOTE — CARE COORDINATION
Case Management Assessment  Initial Evaluation    Date/Time of Evaluation: 6/5/2025 11:51 AM  Assessment Completed by: Henna Flowers RN    If patient is discharged prior to next notation, then this note serves as note for discharge by case management.    Patient Name: Iris Becerra                   YOB: 1960  Diagnosis: Chest pain [R07.9]  CAD (coronary artery disease) [I25.10]  NSTEMI (non-ST elevated myocardial infarction) (HCC) [I21.4]  Chest pain, unspecified type [R07.9]                   Date / Time: 6/4/2025  8:35 AM    Patient Admission Status: Inpatient   Readmission Risk (Low < 19, Mod (19-27), High > 27): Readmission Risk Score: 11.7    Current PCP: Samir Hope APRN - CNP  PCP verified by CM? Yes    Chart Reviewed: Yes      History Provided by: Patient  Patient Orientation: Alert and Oriented    Patient Cognition: Alert    Hospitalization in the last 30 days (Readmission):  No    If yes, Readmission Assessment in  Navigator will be completed.    Advance Directives:      Code Status: Full Code   Patient's Primary Decision Maker is: Legal Next of Kin    Primary Decision Maker: Yousif Becerra - Spouse - [phone]    Secondary Decision Maker: Diana Field - Child - [phone]    Secondary Decision Maker: Roger Becerra - Child - [phone]    Discharge Planning:    Patient lives with: Spouse/Significant Other Type of Home: House  Primary Care Giver: Self  Patient Support Systems include: Spouse/Significant Other   Current Financial resources: Medicare  Current community resources: None  Current services prior to admission: None            Current DME:              Type of Home Care services:  None    ADLS  Prior functional level: Independent in ADLs/IADLs  Current functional level: Assistance with the following:, Shopping, Housework, Cooking    PT AM-PAC:   /24  OT AM-PAC:   /24    Family can provide assistance at DC: Yes  Would you like Case Management to discuss the discharge plan

## 2025-06-06 ENCOUNTER — APPOINTMENT (OUTPATIENT)
Dept: GENERAL RADIOLOGY | Age: 65
DRG: 233 | End: 2025-06-06
Payer: MEDICARE

## 2025-06-06 PROBLEM — I20.0 UNSTABLE ANGINA (HCC): Status: ACTIVE | Noted: 2025-06-06

## 2025-06-06 LAB
ALBUMIN SERPL-MCNC: 3.8 G/DL (ref 3.4–5)
ALP SERPL-CCNC: 64 U/L (ref 40–129)
ALT SERPL-CCNC: 20 U/L (ref 10–40)
ANION GAP SERPL CALCULATED.3IONS-SCNC: 9 MMOL/L (ref 3–16)
APTT BLD: 87.8 SEC (ref 22.1–36.4)
AST SERPL-CCNC: 21 U/L (ref 15–37)
BASOPHILS # BLD: 0 K/UL (ref 0–0.2)
BASOPHILS NFR BLD: 0.6 %
BILIRUB DIRECT SERPL-MCNC: <0.1 MG/DL (ref 0–0.3)
BILIRUB INDIRECT SERPL-MCNC: 0.2 MG/DL (ref 0–1)
BILIRUB SERPL-MCNC: 0.3 MG/DL (ref 0–1)
BUN SERPL-MCNC: 20 MG/DL (ref 7–20)
CALCIUM SERPL-MCNC: 8.7 MG/DL (ref 8.3–10.6)
CHLORIDE SERPL-SCNC: 102 MMOL/L (ref 99–110)
CHOLEST SERPL-MCNC: 164 MG/DL (ref 0–199)
CO2 SERPL-SCNC: 25 MMOL/L (ref 21–32)
CREAT SERPL-MCNC: 1 MG/DL (ref 0.6–1.2)
DEPRECATED RDW RBC AUTO: 19.7 % (ref 12.4–15.4)
DEPRECATED RDW RBC AUTO: 19.7 % (ref 12.4–15.4)
EOSINOPHIL # BLD: 0.1 K/UL (ref 0–0.6)
EOSINOPHIL NFR BLD: 2.2 %
EST. AVERAGE GLUCOSE BLD GHB EST-MCNC: 102.5 MG/DL
GFR SERPLBLD CREATININE-BSD FMLA CKD-EPI: 62 ML/MIN/{1.73_M2}
GLUCOSE SERPL-MCNC: 98 MG/DL (ref 70–99)
HBA1C MFR BLD: 5.2 %
HCT VFR BLD AUTO: 27 % (ref 36–48)
HCT VFR BLD AUTO: 27.1 % (ref 36–48)
HDLC SERPL-MCNC: 60 MG/DL (ref 40–60)
HGB BLD-MCNC: 9 G/DL (ref 12–16)
HGB BLD-MCNC: 9 G/DL (ref 12–16)
INR PPP: 1.06 (ref 0.85–1.15)
LDLC SERPL CALC-MCNC: 86 MG/DL
LYMPHOCYTES # BLD: 0.9 K/UL (ref 1–5.1)
LYMPHOCYTES NFR BLD: 21.6 %
MAGNESIUM SERPL-MCNC: 1.32 MG/DL (ref 1.8–2.4)
MCH RBC QN AUTO: 32.8 PG (ref 26–34)
MCH RBC QN AUTO: 33.1 PG (ref 26–34)
MCHC RBC AUTO-ENTMCNC: 33.2 G/DL (ref 31–36)
MCHC RBC AUTO-ENTMCNC: 33.2 G/DL (ref 31–36)
MCV RBC AUTO: 98.6 FL (ref 80–100)
MCV RBC AUTO: 99.6 FL (ref 80–100)
MONOCYTES # BLD: 0.7 K/UL (ref 0–1.3)
MONOCYTES NFR BLD: 17.1 %
NEUTROPHILS # BLD: 2.3 K/UL (ref 1.7–7.7)
NEUTROPHILS NFR BLD: 58.5 %
PLATELET # BLD AUTO: 104 K/UL (ref 135–450)
PLATELET # BLD AUTO: 98 K/UL (ref 135–450)
PLATELET BLD QL SMEAR: ABNORMAL
PMV BLD AUTO: 8 FL (ref 5–10.5)
PMV BLD AUTO: 8.2 FL (ref 5–10.5)
POTASSIUM SERPL-SCNC: 4.2 MMOL/L (ref 3.5–5.1)
PROT SERPL-MCNC: 6.4 G/DL (ref 6.4–8.2)
PROTHROMBIN TIME: 14 SEC (ref 11.9–14.9)
RBC # BLD AUTO: 2.71 M/UL (ref 4–5.2)
RBC # BLD AUTO: 2.74 M/UL (ref 4–5.2)
SLIDE REVIEW: ABNORMAL
SODIUM SERPL-SCNC: 136 MMOL/L (ref 136–145)
TRIGL SERPL-MCNC: 88 MG/DL (ref 0–150)
VLDLC SERPL CALC-MCNC: 18 MG/DL
WBC # BLD AUTO: 3.9 K/UL (ref 4–11)
WBC # BLD AUTO: 4 K/UL (ref 4–11)

## 2025-06-06 PROCEDURE — 6370000000 HC RX 637 (ALT 250 FOR IP): Performed by: INTERNAL MEDICINE

## 2025-06-06 PROCEDURE — 36592 COLLECT BLOOD FROM PICC: CPT

## 2025-06-06 PROCEDURE — 6370000000 HC RX 637 (ALT 250 FOR IP): Performed by: HOSPITALIST

## 2025-06-06 PROCEDURE — 80076 HEPATIC FUNCTION PANEL: CPT

## 2025-06-06 PROCEDURE — 99291 CRITICAL CARE FIRST HOUR: CPT | Performed by: INTERNAL MEDICINE

## 2025-06-06 PROCEDURE — 6360000002 HC RX W HCPCS: Performed by: THORACIC SURGERY (CARDIOTHORACIC VASCULAR SURGERY)

## 2025-06-06 PROCEDURE — 71045 X-RAY EXAM CHEST 1 VIEW: CPT

## 2025-06-06 PROCEDURE — 80061 LIPID PANEL: CPT

## 2025-06-06 PROCEDURE — 2100000000 HC CCU R&B

## 2025-06-06 PROCEDURE — 80048 BASIC METABOLIC PNL TOTAL CA: CPT

## 2025-06-06 PROCEDURE — 83735 ASSAY OF MAGNESIUM: CPT

## 2025-06-06 PROCEDURE — 6370000000 HC RX 637 (ALT 250 FOR IP)

## 2025-06-06 PROCEDURE — 6360000002 HC RX W HCPCS

## 2025-06-06 PROCEDURE — 99232 SBSQ HOSP IP/OBS MODERATE 35: CPT

## 2025-06-06 PROCEDURE — 85025 COMPLETE CBC W/AUTO DIFF WBC: CPT

## 2025-06-06 PROCEDURE — 85027 COMPLETE CBC AUTOMATED: CPT

## 2025-06-06 PROCEDURE — 2580000003 HC RX 258

## 2025-06-06 PROCEDURE — 85730 THROMBOPLASTIN TIME PARTIAL: CPT

## 2025-06-06 PROCEDURE — 85610 PROTHROMBIN TIME: CPT

## 2025-06-06 DEVICE — CABLE STRNL TAPR 3 BLNT 1 CUT EDGE NDL SS: Type: IMPLANTABLE DEVICE | Site: CHEST  WALL | Status: FUNCTIONAL

## 2025-06-06 RX ORDER — CHLORHEXIDINE GLUCONATE ORAL RINSE 1.2 MG/ML
15 SOLUTION DENTAL ONCE
Status: COMPLETED | OUTPATIENT
Start: 2025-06-07 | End: 2025-06-07

## 2025-06-06 RX ORDER — CHLORHEXIDINE GLUCONATE ORAL RINSE 1.2 MG/ML
15 SOLUTION DENTAL ONCE
Status: DISCONTINUED | OUTPATIENT
Start: 2025-06-06 | End: 2025-06-06 | Stop reason: SDUPTHER

## 2025-06-06 RX ORDER — SODIUM CHLORIDE, SODIUM LACTATE, POTASSIUM CHLORIDE, CALCIUM CHLORIDE 600; 310; 30; 20 MG/100ML; MG/100ML; MG/100ML; MG/100ML
INJECTION, SOLUTION INTRAVENOUS CONTINUOUS
Status: DISCONTINUED | OUTPATIENT
Start: 2025-06-07 | End: 2025-06-09

## 2025-06-06 RX ORDER — METHOCARBAMOL 500 MG/1
500 TABLET, FILM COATED ORAL DAILY PRN
Status: DISCONTINUED | OUTPATIENT
Start: 2025-06-06 | End: 2025-06-07

## 2025-06-06 RX ORDER — BISACODYL 10 MG
10 SUPPOSITORY, RECTAL RECTAL ONCE
Status: COMPLETED | OUTPATIENT
Start: 2025-06-06 | End: 2025-06-06

## 2025-06-06 RX ORDER — ASPIRIN 81 MG/1
81 TABLET ORAL ONCE
Status: COMPLETED | OUTPATIENT
Start: 2025-06-07 | End: 2025-06-07

## 2025-06-06 RX ORDER — CHLORHEXIDINE GLUCONATE 40 MG/ML
SOLUTION TOPICAL 2 TIMES DAILY
Status: DISCONTINUED | OUTPATIENT
Start: 2025-06-06 | End: 2025-06-07 | Stop reason: HOSPADM

## 2025-06-06 RX ORDER — NOREPINEPHRINE BITARTRATE 0.06 MG/ML
1-100 INJECTION, SOLUTION INTRAVENOUS CONTINUOUS
Status: DISCONTINUED | OUTPATIENT
Start: 2025-06-06 | End: 2025-06-07 | Stop reason: HOSPADM

## 2025-06-06 RX ORDER — DEXMEDETOMIDINE HYDROCHLORIDE 4 UG/ML
.1-1.5 INJECTION, SOLUTION INTRAVENOUS CONTINUOUS
Status: DISCONTINUED | OUTPATIENT
Start: 2025-06-06 | End: 2025-06-07 | Stop reason: HOSPADM

## 2025-06-06 RX ORDER — MUPIROCIN 2 %
OINTMENT (GRAM) TOPICAL 2 TIMES DAILY
Status: DISCONTINUED | OUTPATIENT
Start: 2025-06-06 | End: 2025-06-06 | Stop reason: SDUPTHER

## 2025-06-06 RX ORDER — ACETAMINOPHEN 500 MG
1000 TABLET ORAL ONCE
Status: COMPLETED | OUTPATIENT
Start: 2025-06-07 | End: 2025-06-07

## 2025-06-06 RX ORDER — CHLORHEXIDINE GLUCONATE 40 MG/ML
SOLUTION TOPICAL 2 TIMES DAILY
Status: DISCONTINUED | OUTPATIENT
Start: 2025-06-06 | End: 2025-06-06 | Stop reason: SDUPTHER

## 2025-06-06 RX ORDER — METOPROLOL TARTRATE 25 MG/1
12.5 TABLET, FILM COATED ORAL ONCE
Status: DISCONTINUED | OUTPATIENT
Start: 2025-06-06 | End: 2025-06-06 | Stop reason: SDUPTHER

## 2025-06-06 RX ORDER — METOPROLOL TARTRATE 25 MG/1
12.5 TABLET, FILM COATED ORAL ONCE
Status: DISCONTINUED | OUTPATIENT
Start: 2025-06-07 | End: 2025-06-07 | Stop reason: HOSPADM

## 2025-06-06 RX ORDER — HEPARIN SODIUM 10000 [USP'U]/100ML
5-30 INJECTION, SOLUTION INTRAVENOUS CONTINUOUS
Status: ACTIVE | OUTPATIENT
Start: 2025-06-06 | End: 2025-06-07

## 2025-06-06 RX ORDER — MUPIROCIN 2 %
OINTMENT (GRAM) TOPICAL 2 TIMES DAILY
Status: DISCONTINUED | OUTPATIENT
Start: 2025-06-06 | End: 2025-06-07 | Stop reason: HOSPADM

## 2025-06-06 RX ADMIN — MUPIROCIN: 20 OINTMENT TOPICAL at 21:03

## 2025-06-06 RX ADMIN — SODIUM CHLORIDE, SODIUM LACTATE, POTASSIUM CHLORIDE, AND CALCIUM CHLORIDE: .6; .31; .03; .02 INJECTION, SOLUTION INTRAVENOUS at 08:17

## 2025-06-06 RX ADMIN — CHLORHEXIDINE GLUCONATE 118 ML: 40 SOLUTION TOPICAL at 21:02

## 2025-06-06 RX ADMIN — MUPIROCIN: 20 OINTMENT TOPICAL at 08:07

## 2025-06-06 RX ADMIN — HEPARIN SODIUM 10 UNITS/KG/HR: 10000 INJECTION, SOLUTION INTRAVENOUS at 09:15

## 2025-06-06 RX ADMIN — METHOCARBAMOL 500 MG: 500 TABLET ORAL at 21:02

## 2025-06-06 RX ADMIN — OXYCODONE 10 MG: 5 TABLET ORAL at 21:02

## 2025-06-06 RX ADMIN — SODIUM CHLORIDE, SODIUM LACTATE, POTASSIUM CHLORIDE, AND CALCIUM CHLORIDE: .6; .31; .03; .02 INJECTION, SOLUTION INTRAVENOUS at 21:30

## 2025-06-06 RX ADMIN — ACETAMINOPHEN 650 MG: 325 TABLET ORAL at 21:01

## 2025-06-06 RX ADMIN — MAGNESIUM SULFATE HEPTAHYDRATE 2000 MG: 40 INJECTION, SOLUTION INTRAVENOUS at 08:15

## 2025-06-06 RX ADMIN — BISACODYL 10 MG: 10 SUPPOSITORY RECTAL at 21:00

## 2025-06-06 RX ADMIN — ACETAMINOPHEN 1000 MG: 500 TABLET ORAL at 08:07

## 2025-06-06 RX ADMIN — MAGNESIUM SULFATE HEPTAHYDRATE 2000 MG: 40 INJECTION, SOLUTION INTRAVENOUS at 05:42

## 2025-06-06 ASSESSMENT — PAIN SCALES - GENERAL
PAINLEVEL_OUTOF10: 0
PAINLEVEL_OUTOF10: 4
PAINLEVEL_OUTOF10: 0
PAINLEVEL_OUTOF10: 0

## 2025-06-06 NOTE — CARE COORDINATION
Chart reviewed. LOS day # 2. Admitted as inpatient. Followed by IM, CTS, and Cards. S/p LHC, now with balloon pump. CTS planning urgent CABG. Pt is from home with sp. IPTA. Uses no dme at baseline. Current ovarian cancer patient receiving chemo at this time. Will follow for post op needs.

## 2025-06-06 NOTE — PLAN OF CARE
Problem: Pain  Goal: Verbalizes/displays adequate comfort level or baseline comfort level  Outcome: Progressing  Flowsheets (Taken 6/5/2025 2000)  Verbalizes/displays adequate comfort level or baseline comfort level:   Encourage patient to monitor pain and request assistance   Assess pain using appropriate pain scale   Administer analgesics based on type and severity of pain and evaluate response   Implement non-pharmacological measures as appropriate and evaluate response   Consider cultural and social influences on pain and pain management   Notify Licensed Independent Practitioner if interventions unsuccessful or patient reports new pain     Problem: Discharge Planning  Goal: Discharge to home or other facility with appropriate resources  Outcome: Progressing  Flowsheets (Taken 6/5/2025 2000)  Discharge to home or other facility with appropriate resources:   Identify barriers to discharge with patient and caregiver   Arrange for needed discharge resources and transportation as appropriate   Identify discharge learning needs (meds, wound care, etc)   Refer to discharge planning if patient needs post-hospital services based on physician order or complex needs related to functional status, cognitive ability or social support system     Problem: Safety - Adult  Goal: Free from fall injury  Outcome: Progressing     Problem: Skin/Tissue Integrity  Goal: Skin integrity remains intact  Description: 1.  Monitor for areas of redness and/or skin breakdown2.  Assess vascular access sites hourly3.  Every 4-6 hours minimum:  Change oxygen saturation probe site4.  Every 4-6 hours:  If on nasal continuous positive airway pressure, respiratory therapy assess nares and determine need for appliance change or resting period  Outcome: Progressing  Flowsheets (Taken 6/5/2025 2000)  Skin Integrity Remains Intact:   Monitor for areas of redness and/or skin breakdown   Assess vascular access sites hourly   Every 4-6 hours minimum:

## 2025-06-07 ENCOUNTER — APPOINTMENT (OUTPATIENT)
Dept: GENERAL RADIOLOGY | Age: 65
DRG: 233 | End: 2025-06-07
Payer: MEDICARE

## 2025-06-07 ENCOUNTER — ANESTHESIA EVENT (OUTPATIENT)
Dept: OPERATING ROOM | Age: 65
End: 2025-06-07
Payer: MEDICARE

## 2025-06-07 ENCOUNTER — ANESTHESIA (OUTPATIENT)
Dept: OPERATING ROOM | Age: 65
End: 2025-06-07
Payer: MEDICARE

## 2025-06-07 PROBLEM — I25.10 MULTIPLE VESSEL CORONARY ARTERY DISEASE: Status: ACTIVE | Noted: 2025-06-07

## 2025-06-07 LAB
ANION GAP SERPL CALCULATED.3IONS-SCNC: 7 MMOL/L (ref 3–16)
APTT BLD: 28.6 SEC (ref 22.1–36.4)
APTT BLD: 58.1 SEC (ref 22.1–36.4)
BASE EXCESS BLDA CALC-SCNC: -1 MMOL/L (ref -3–3)
BASE EXCESS BLDA CALC-SCNC: -2 MMOL/L (ref -3–3)
BASE EXCESS BLDA CALC-SCNC: -3 MMOL/L (ref -3–3)
BASE EXCESS BLDA CALC-SCNC: 1 MMOL/L (ref -3–3)
BASOPHILS # BLD: 0 K/UL (ref 0–0.2)
BASOPHILS NFR BLD: 0.6 %
BLOOD BANK DISPENSE STATUS: NORMAL
BLOOD BANK DISPENSE STATUS: NORMAL
BLOOD BANK PRODUCT CODE: NORMAL
BLOOD BANK PRODUCT CODE: NORMAL
BPU ID: NORMAL
BPU ID: NORMAL
BUN SERPL-MCNC: 19 MG/DL (ref 7–20)
CA-I BLD-SCNC: 1.1 MMOL/L (ref 1.12–1.32)
CA-I BLD-SCNC: 1.12 MMOL/L (ref 1.12–1.32)
CA-I BLD-SCNC: 1.13 MMOL/L (ref 1.12–1.32)
CA-I BLD-SCNC: 1.17 MMOL/L (ref 1.12–1.32)
CA-I BLD-SCNC: 1.17 MMOL/L (ref 1.12–1.32)
CA-I BLD-SCNC: 1.18 MMOL/L (ref 1.12–1.32)
CA-I BLD-SCNC: 1.18 MMOL/L (ref 1.12–1.32)
CA-I BLD-SCNC: 1.19 MMOL/L (ref 1.12–1.32)
CA-I BLD-SCNC: 1.19 MMOL/L (ref 1.12–1.32)
CA-I BLD-SCNC: 1.2 MMOL/L (ref 1.12–1.32)
CA-I BLD-SCNC: 1.23 MMOL/L (ref 1.12–1.32)
CALCIUM SERPL-MCNC: 8.9 MG/DL (ref 8.3–10.6)
CHLORIDE SERPL-SCNC: 103 MMOL/L (ref 99–110)
CHOLEST SERPL-MCNC: 168 MG/DL (ref 0–199)
CO2 BLDA-SCNC: 24 MMOL/L
CO2 BLDA-SCNC: 24 MMOL/L
CO2 BLDA-SCNC: 25 MMOL/L
CO2 BLDA-SCNC: 27 MMOL/L
CO2 SERPL-SCNC: 26 MMOL/L (ref 21–32)
CREAT SERPL-MCNC: 0.9 MG/DL (ref 0.6–1.2)
DEPRECATED RDW RBC AUTO: 19.8 % (ref 12.4–15.4)
DESCRIPTION BLOOD BANK: NORMAL
DESCRIPTION BLOOD BANK: NORMAL
EOSINOPHIL # BLD: 0.1 K/UL (ref 0–0.6)
EOSINOPHIL NFR BLD: 1.8 %
GFR SERPLBLD CREATININE-BSD FMLA CKD-EPI: 70 ML/MIN/{1.73_M2}
GLUCOSE BLD-MCNC: 111 MG/DL (ref 70–99)
GLUCOSE BLD-MCNC: 115 MG/DL (ref 70–99)
GLUCOSE BLD-MCNC: 123 MG/DL (ref 70–99)
GLUCOSE BLD-MCNC: 124 MG/DL (ref 70–99)
GLUCOSE BLD-MCNC: 124 MG/DL (ref 70–99)
GLUCOSE BLD-MCNC: 129 MG/DL (ref 70–99)
GLUCOSE BLD-MCNC: 134 MG/DL (ref 70–99)
GLUCOSE BLD-MCNC: 147 MG/DL (ref 70–99)
GLUCOSE BLD-MCNC: 84 MG/DL (ref 70–99)
GLUCOSE BLD-MCNC: 85 MG/DL (ref 70–99)
GLUCOSE BLD-MCNC: 87 MG/DL (ref 70–99)
GLUCOSE BLD-MCNC: 91 MG/DL (ref 70–99)
GLUCOSE SERPL-MCNC: 94 MG/DL (ref 70–99)
HCO3 BLDA-SCNC: 22.9 MMOL/L (ref 21–29)
HCO3 BLDA-SCNC: 23 MMOL/L (ref 21–29)
HCO3 BLDA-SCNC: 23.2 MMOL/L (ref 21–29)
HCO3 BLDA-SCNC: 23.6 MMOL/L (ref 21–29)
HCO3 BLDA-SCNC: 23.7 MMOL/L (ref 21–29)
HCO3 BLDA-SCNC: 23.9 MMOL/L (ref 21–29)
HCO3 BLDA-SCNC: 23.9 MMOL/L (ref 21–29)
HCO3 BLDA-SCNC: 24.9 MMOL/L (ref 21–29)
HCO3 BLDA-SCNC: 25.2 MMOL/L (ref 21–29)
HCO3 BLDA-SCNC: 25.3 MMOL/L (ref 21–29)
HCO3 BLDA-SCNC: 25.6 MMOL/L (ref 21–29)
HCT VFR BLD AUTO: 24 % (ref 36–48)
HCT VFR BLD AUTO: 25 % (ref 36–48)
HCT VFR BLD AUTO: 25.8 % (ref 36–48)
HCT VFR BLD AUTO: 27 % (ref 36–48)
HCT VFR BLD AUTO: 28 % (ref 36–48)
HCT VFR BLD AUTO: 28 % (ref 36–48)
HCT VFR BLD AUTO: 29 % (ref 36–48)
HCT VFR BLD AUTO: 29 % (ref 36–48)
HCT VFR BLD AUTO: 30 % (ref 36–48)
HCT VFR BLD AUTO: 30 % (ref 36–48)
HDLC SERPL-MCNC: 58 MG/DL (ref 40–60)
HGB BLD CALC-MCNC: 10.1 GM/DL (ref 12–16)
HGB BLD CALC-MCNC: 10.1 GM/DL (ref 12–16)
HGB BLD CALC-MCNC: 8.1 GM/DL (ref 12–16)
HGB BLD CALC-MCNC: 8.3 GM/DL (ref 12–16)
HGB BLD CALC-MCNC: 8.3 GM/DL (ref 12–16)
HGB BLD CALC-MCNC: 8.6 GM/DL (ref 12–16)
HGB BLD CALC-MCNC: 9.3 GM/DL (ref 12–16)
HGB BLD CALC-MCNC: 9.4 GM/DL (ref 12–16)
HGB BLD CALC-MCNC: 9.7 GM/DL (ref 12–16)
HGB BLD CALC-MCNC: 9.7 GM/DL (ref 12–16)
HGB BLD CALC-MCNC: 9.9 GM/DL (ref 12–16)
HGB BLD-MCNC: 8.8 G/DL (ref 12–16)
INR PPP: 1.03 (ref 0.85–1.15)
LACTATE BLD-SCNC: 0.53 MMOL/L (ref 0.4–2)
LACTATE BLD-SCNC: 1.37 MMOL/L (ref 0.4–2)
LACTATE BLD-SCNC: 1.46 MMOL/L (ref 0.4–2)
LACTATE BLD-SCNC: 1.66 MMOL/L (ref 0.4–2)
LACTATE BLD-SCNC: 1.94 MMOL/L (ref 0.4–2)
LACTATE BLD-SCNC: 2.15 MMOL/L (ref 0.4–2)
LACTATE BLD-SCNC: 2.39 MMOL/L (ref 0.4–2)
LACTATE BLD-SCNC: <0.3 MMOL/L (ref 0.4–2)
LDLC SERPL CALC-MCNC: 92 MG/DL
LYMPHOCYTES # BLD: 0.8 K/UL (ref 1–5.1)
LYMPHOCYTES NFR BLD: 19.5 %
MAGNESIUM SERPL-MCNC: 2.14 MG/DL (ref 1.8–2.4)
MCH RBC QN AUTO: 33.9 PG (ref 26–34)
MCHC RBC AUTO-ENTMCNC: 34.3 G/DL (ref 31–36)
MCV RBC AUTO: 99 FL (ref 80–100)
MONOCYTES # BLD: 0.8 K/UL (ref 0–1.3)
MONOCYTES NFR BLD: 18.6 %
NEUTROPHILS # BLD: 2.5 K/UL (ref 1.7–7.7)
NEUTROPHILS NFR BLD: 59.5 %
PCO2 BLDA: 36.3 MM HG (ref 35–45)
PCO2 BLDA: 36.8 MM HG (ref 35–45)
PCO2 BLDA: 36.9 MM HG (ref 35–45)
PCO2 BLDA: 39.7 MM HG (ref 35–45)
PCO2 BLDA: 39.8 MM HG (ref 35–45)
PCO2 BLDA: 42.7 MM HG (ref 35–45)
PCO2 BLDA: 42.8 MM HG (ref 35–45)
PCO2 BLDA: 43.4 MM HG (ref 35–45)
PCO2 BLDA: 46.3 MM HG (ref 35–45)
PCO2 BLDA: 48.5 MM HG (ref 35–45)
PCO2 BLDA: 53.5 MM HG (ref 35–45)
PERFORMED ON: ABNORMAL
PH BLDA: 7.28 [PH] (ref 7.35–7.45)
PH BLDA: 7.33 [PH] (ref 7.35–7.45)
PH BLDA: 7.34 [PH] (ref 7.35–7.45)
PH BLDA: 7.34 [PH] (ref 7.35–7.45)
PH BLDA: 7.35 [PH] (ref 7.35–7.45)
PH BLDA: 7.37 [PH] (ref 7.35–7.45)
PH BLDA: 7.38 [PH] (ref 7.35–7.45)
PH BLDA: 7.39 [PH] (ref 7.35–7.45)
PH BLDA: 7.41 [PH] (ref 7.35–7.45)
PH BLDA: 7.42 [PH] (ref 7.35–7.45)
PH BLDA: 7.42 [PH] (ref 7.35–7.45)
PHOSPHATE SERPL-MCNC: 3.8 MG/DL (ref 2.5–4.9)
PLATELET # BLD AUTO: 79 K/UL (ref 135–450)
PMV BLD AUTO: 8.2 FL (ref 5–10.5)
PO2 BLDA: 121.8 MM HG (ref 75–108)
PO2 BLDA: 135.9 MM HG (ref 75–108)
PO2 BLDA: 188.5 MM HG (ref 75–108)
PO2 BLDA: 372.3 MM HG (ref 75–108)
PO2 BLDA: 383.2 MM HG (ref 75–108)
PO2 BLDA: 410.8 MM HG (ref 75–108)
PO2 BLDA: 426.9 MM HG (ref 75–108)
PO2 BLDA: 56 MM HG (ref 75–108)
PO2 BLDA: 60.4 MM HG (ref 75–108)
PO2 BLDA: 61.6 MM HG (ref 75–108)
PO2 BLDA: 76.7 MM HG (ref 75–108)
POC SAMPLE TYPE: ABNORMAL
POTASSIUM BLD-SCNC: 3.8 MMOL/L (ref 3.5–5.1)
POTASSIUM BLD-SCNC: 4 MMOL/L (ref 3.5–5.1)
POTASSIUM BLD-SCNC: 4 MMOL/L (ref 3.5–5.1)
POTASSIUM BLD-SCNC: 4.1 MMOL/L (ref 3.5–5.1)
POTASSIUM BLD-SCNC: 4.2 MMOL/L (ref 3.5–5.1)
POTASSIUM BLD-SCNC: 4.4 MMOL/L (ref 3.5–5.1)
POTASSIUM BLD-SCNC: 4.5 MMOL/L (ref 3.5–5.1)
POTASSIUM BLD-SCNC: 4.5 MMOL/L (ref 3.5–5.1)
POTASSIUM BLD-SCNC: 5.4 MMOL/L (ref 3.5–5.1)
POTASSIUM SERPL-SCNC: 4.7 MMOL/L (ref 3.5–5.1)
PROTHROMBIN TIME: 13.7 SEC (ref 11.9–14.9)
RBC # BLD AUTO: 2.61 M/UL (ref 4–5.2)
REASON FOR REJECTION: NORMAL
REJECTED TEST: NORMAL
SAO2 % BLDA: 100 % (ref 93–100)
SAO2 % BLDA: 89 % (ref 93–100)
SAO2 % BLDA: 90 % (ref 93–100)
SAO2 % BLDA: 91 % (ref 93–100)
SAO2 % BLDA: 94 % (ref 93–100)
SAO2 % BLDA: 99 % (ref 93–100)
SAO2 % BLDA: 99 % (ref 93–100)
SODIUM BLD-SCNC: 140 MMOL/L (ref 136–145)
SODIUM BLD-SCNC: 142 MMOL/L (ref 136–145)
SODIUM BLD-SCNC: 144 MMOL/L (ref 136–145)
SODIUM BLD-SCNC: 144 MMOL/L (ref 136–145)
SODIUM BLD-SCNC: 145 MMOL/L (ref 136–145)
SODIUM BLD-SCNC: 145 MMOL/L (ref 136–145)
SODIUM BLD-SCNC: 146 MMOL/L (ref 136–145)
SODIUM BLD-SCNC: 147 MMOL/L (ref 136–145)
SODIUM SERPL-SCNC: 136 MMOL/L (ref 136–145)
TRIGL SERPL-MCNC: 92 MG/DL (ref 0–150)
VLDLC SERPL CALC-MCNC: 18 MG/DL
WBC # BLD AUTO: 4.2 K/UL (ref 4–11)

## 2025-06-07 PROCEDURE — 85014 HEMATOCRIT: CPT

## 2025-06-07 PROCEDURE — 02100A8 BYPASS CORONARY ARTERY, ONE ARTERY FROM RIGHT INTERNAL MAMMARY WITH AUTOLOGOUS ARTERIAL TISSUE, OPEN APPROACH: ICD-10-PCS | Performed by: THORACIC SURGERY (CARDIOTHORACIC VASCULAR SURGERY)

## 2025-06-07 PROCEDURE — 83735 ASSAY OF MAGNESIUM: CPT

## 2025-06-07 PROCEDURE — 3700000001 HC ADD 15 MINUTES (ANESTHESIA): Performed by: THORACIC SURGERY (CARDIOTHORACIC VASCULAR SURGERY)

## 2025-06-07 PROCEDURE — 2720000010 HC SURG SUPPLY STERILE: Performed by: THORACIC SURGERY (CARDIOTHORACIC VASCULAR SURGERY)

## 2025-06-07 PROCEDURE — 6370000000 HC RX 637 (ALT 250 FOR IP)

## 2025-06-07 PROCEDURE — 85610 PROTHROMBIN TIME: CPT

## 2025-06-07 PROCEDURE — 85347 COAGULATION TIME ACTIVATED: CPT

## 2025-06-07 PROCEDURE — 36430 TRANSFUSION BLD/BLD COMPNT: CPT

## 2025-06-07 PROCEDURE — 6360000002 HC RX W HCPCS: Performed by: ANESTHESIOLOGY

## 2025-06-07 PROCEDURE — 2580000003 HC RX 258: Performed by: THORACIC SURGERY (CARDIOTHORACIC VASCULAR SURGERY)

## 2025-06-07 PROCEDURE — 83605 ASSAY OF LACTIC ACID: CPT

## 2025-06-07 PROCEDURE — 6360000002 HC RX W HCPCS: Performed by: THORACIC SURGERY (CARDIOTHORACIC VASCULAR SURGERY)

## 2025-06-07 PROCEDURE — 84295 ASSAY OF SERUM SODIUM: CPT

## 2025-06-07 PROCEDURE — 3700000000 HC ANESTHESIA ATTENDED CARE: Performed by: THORACIC SURGERY (CARDIOTHORACIC VASCULAR SURGERY)

## 2025-06-07 PROCEDURE — 2100000000 HC CCU R&B

## 2025-06-07 PROCEDURE — 71045 X-RAY EXAM CHEST 1 VIEW: CPT

## 2025-06-07 PROCEDURE — 2500000003 HC RX 250 WO HCPCS

## 2025-06-07 PROCEDURE — 82803 BLOOD GASES ANY COMBINATION: CPT

## 2025-06-07 PROCEDURE — C1751 CATH, INF, PER/CENT/MIDLINE: HCPCS | Performed by: THORACIC SURGERY (CARDIOTHORACIC VASCULAR SURGERY)

## 2025-06-07 PROCEDURE — 83036 HEMOGLOBIN GLYCOSYLATED A1C: CPT

## 2025-06-07 PROCEDURE — 6370000000 HC RX 637 (ALT 250 FOR IP): Performed by: THORACIC SURGERY (CARDIOTHORACIC VASCULAR SURGERY)

## 2025-06-07 PROCEDURE — 82330 ASSAY OF CALCIUM: CPT

## 2025-06-07 PROCEDURE — 3600000008 HC SURGERY OHS BASE: Performed by: THORACIC SURGERY (CARDIOTHORACIC VASCULAR SURGERY)

## 2025-06-07 PROCEDURE — 84100 ASSAY OF PHOSPHORUS: CPT

## 2025-06-07 PROCEDURE — 6360000002 HC RX W HCPCS

## 2025-06-07 PROCEDURE — 2500000003 HC RX 250 WO HCPCS: Performed by: THORACIC SURGERY (CARDIOTHORACIC VASCULAR SURGERY)

## 2025-06-07 PROCEDURE — 80048 BASIC METABOLIC PNL TOTAL CA: CPT

## 2025-06-07 PROCEDURE — 80061 LIPID PANEL: CPT

## 2025-06-07 PROCEDURE — 2580000003 HC RX 258

## 2025-06-07 PROCEDURE — 2709999900 HC NON-CHARGEABLE SUPPLY: Performed by: THORACIC SURGERY (CARDIOTHORACIC VASCULAR SURGERY)

## 2025-06-07 PROCEDURE — 6360000002 HC RX W HCPCS: Performed by: INTERNAL MEDICINE

## 2025-06-07 PROCEDURE — 94761 N-INVAS EAR/PLS OXIMETRY MLT: CPT

## 2025-06-07 PROCEDURE — 76942 ECHO GUIDE FOR BIOPSY: CPT | Performed by: ANESTHESIOLOGY

## 2025-06-07 PROCEDURE — 2700000000 HC OXYGEN THERAPY PER DAY

## 2025-06-07 PROCEDURE — 33534 CABG ARTERIAL TWO: CPT | Performed by: THORACIC SURGERY (CARDIOTHORACIC VASCULAR SURGERY)

## 2025-06-07 PROCEDURE — 2580000003 HC RX 258: Performed by: ANESTHESIOLOGY

## 2025-06-07 PROCEDURE — 2500000003 HC RX 250 WO HCPCS: Performed by: ANESTHESIOLOGY

## 2025-06-07 PROCEDURE — 3600000018 HC SURGERY OHS ADDTL 15MIN: Performed by: THORACIC SURGERY (CARDIOTHORACIC VASCULAR SURGERY)

## 2025-06-07 PROCEDURE — 94002 VENT MGMT INPAT INIT DAY: CPT

## 2025-06-07 PROCEDURE — P9045 ALBUMIN (HUMAN), 5%, 250 ML: HCPCS

## 2025-06-07 PROCEDURE — 94640 AIRWAY INHALATION TREATMENT: CPT

## 2025-06-07 PROCEDURE — 84132 ASSAY OF SERUM POTASSIUM: CPT

## 2025-06-07 PROCEDURE — 85730 THROMBOPLASTIN TIME PARTIAL: CPT

## 2025-06-07 PROCEDURE — 7100000010 HC PHASE II RECOVERY - FIRST 15 MIN

## 2025-06-07 PROCEDURE — 02100A9 BYPASS CORONARY ARTERY, ONE ARTERY FROM LEFT INTERNAL MAMMARY WITH AUTOLOGOUS ARTERIAL TISSUE, OPEN APPROACH: ICD-10-PCS | Performed by: THORACIC SURGERY (CARDIOTHORACIC VASCULAR SURGERY)

## 2025-06-07 PROCEDURE — C1729 CATH, DRAINAGE: HCPCS | Performed by: THORACIC SURGERY (CARDIOTHORACIC VASCULAR SURGERY)

## 2025-06-07 PROCEDURE — 82947 ASSAY GLUCOSE BLOOD QUANT: CPT

## 2025-06-07 PROCEDURE — C1713 ANCHOR/SCREW BN/BN,TIS/BN: HCPCS | Performed by: THORACIC SURGERY (CARDIOTHORACIC VASCULAR SURGERY)

## 2025-06-07 PROCEDURE — 7100000011 HC PHASE II RECOVERY - ADDTL 15 MIN

## 2025-06-07 PROCEDURE — 85025 COMPLETE CBC W/AUTO DIFF WBC: CPT

## 2025-06-07 RX ORDER — CHLORHEXIDINE GLUCONATE ORAL RINSE 1.2 MG/ML
15 SOLUTION DENTAL 2 TIMES DAILY
Status: DISCONTINUED | OUTPATIENT
Start: 2025-06-07 | End: 2025-06-12 | Stop reason: HOSPADM

## 2025-06-07 RX ORDER — ETOMIDATE 2 MG/ML
INJECTION INTRAVENOUS
Status: DISCONTINUED | OUTPATIENT
Start: 2025-06-07 | End: 2025-06-07 | Stop reason: SDUPTHER

## 2025-06-07 RX ORDER — ALBUMIN HUMAN 50 G/1000ML
25 SOLUTION INTRAVENOUS PRN
Status: ACTIVE | OUTPATIENT
Start: 2025-06-07 | End: 2025-06-09

## 2025-06-07 RX ORDER — ASPIRIN 81 MG/1
81 TABLET ORAL DAILY
Status: CANCELLED | OUTPATIENT
Start: 2025-06-08

## 2025-06-07 RX ORDER — SODIUM CHLORIDE 9 MG/ML
INJECTION, SOLUTION INTRAVENOUS PRN
Status: DISCONTINUED | OUTPATIENT
Start: 2025-06-07 | End: 2025-06-10

## 2025-06-07 RX ORDER — SENNA AND DOCUSATE SODIUM 50; 8.6 MG/1; MG/1
1 TABLET, FILM COATED ORAL 2 TIMES DAILY
Status: DISCONTINUED | OUTPATIENT
Start: 2025-06-07 | End: 2025-06-12 | Stop reason: HOSPADM

## 2025-06-07 RX ORDER — BISACODYL 10 MG
10 SUPPOSITORY, RECTAL RECTAL DAILY PRN
Status: DISCONTINUED | OUTPATIENT
Start: 2025-06-07 | End: 2025-06-12 | Stop reason: HOSPADM

## 2025-06-07 RX ORDER — INDOMETHACIN 25 MG/1
50 CAPSULE ORAL EVERY 30 MIN PRN
Status: ACTIVE | OUTPATIENT
Start: 2025-06-07 | End: 2025-06-08

## 2025-06-07 RX ORDER — ALBUTEROL SULFATE 0.83 MG/ML
2.5 SOLUTION RESPIRATORY (INHALATION)
Status: DISCONTINUED | OUTPATIENT
Start: 2025-06-07 | End: 2025-06-12 | Stop reason: HOSPADM

## 2025-06-07 RX ORDER — HEPARIN SODIUM 1000 [USP'U]/ML
INJECTION, SOLUTION INTRAVENOUS; SUBCUTANEOUS
Status: DISCONTINUED | OUTPATIENT
Start: 2025-06-07 | End: 2025-06-07 | Stop reason: SDUPTHER

## 2025-06-07 RX ORDER — PROTAMINE SULFATE 10 MG/ML
INJECTION, SOLUTION INTRAVENOUS
Status: DISCONTINUED | OUTPATIENT
Start: 2025-06-07 | End: 2025-06-07 | Stop reason: SDUPTHER

## 2025-06-07 RX ORDER — DEXTROSE MONOHYDRATE 100 MG/ML
INJECTION, SOLUTION INTRAVENOUS CONTINUOUS PRN
Status: DISCONTINUED | OUTPATIENT
Start: 2025-06-07 | End: 2025-06-12 | Stop reason: HOSPADM

## 2025-06-07 RX ORDER — SENNOSIDES 8.6 MG
325 CAPSULE ORAL ONCE
Status: DISCONTINUED | OUTPATIENT
Start: 2025-06-07 | End: 2025-06-07

## 2025-06-07 RX ORDER — ASPIRIN 300 MG/1
300 SUPPOSITORY RECTAL ONCE
Status: COMPLETED | OUTPATIENT
Start: 2025-06-07 | End: 2025-06-07

## 2025-06-07 RX ORDER — NOREPINEPHRINE BITARTRATE 0.06 MG/ML
1-30 INJECTION, SOLUTION INTRAVENOUS CONTINUOUS PRN
Status: DISCONTINUED | OUTPATIENT
Start: 2025-06-07 | End: 2025-06-10

## 2025-06-07 RX ORDER — SODIUM CHLORIDE, SODIUM LACTATE, POTASSIUM CHLORIDE, CALCIUM CHLORIDE 600; 310; 30; 20 MG/100ML; MG/100ML; MG/100ML; MG/100ML
INJECTION, SOLUTION INTRAVENOUS
Status: DISCONTINUED | OUTPATIENT
Start: 2025-06-07 | End: 2025-06-07 | Stop reason: SDUPTHER

## 2025-06-07 RX ORDER — ONDANSETRON 2 MG/ML
4 INJECTION INTRAMUSCULAR; INTRAVENOUS EVERY 6 HOURS PRN
Status: DISCONTINUED | OUTPATIENT
Start: 2025-06-07 | End: 2025-06-12 | Stop reason: HOSPADM

## 2025-06-07 RX ORDER — MEPERIDINE HYDROCHLORIDE 50 MG/ML
25 INJECTION INTRAMUSCULAR; INTRAVENOUS; SUBCUTANEOUS
Status: DISCONTINUED | OUTPATIENT
Start: 2025-06-07 | End: 2025-06-10

## 2025-06-07 RX ORDER — MAGNESIUM HYDROXIDE 1200 MG/15ML
LIQUID ORAL CONTINUOUS PRN
Status: COMPLETED | OUTPATIENT
Start: 2025-06-07 | End: 2025-06-07

## 2025-06-07 RX ORDER — MUPIROCIN 2 %
OINTMENT (GRAM) TOPICAL 2 TIMES DAILY
Status: COMPLETED | OUTPATIENT
Start: 2025-06-07 | End: 2025-06-12

## 2025-06-07 RX ORDER — PANTOPRAZOLE SODIUM 40 MG/1
40 TABLET, DELAYED RELEASE ORAL DAILY
Status: DISCONTINUED | OUTPATIENT
Start: 2025-06-07 | End: 2025-06-12 | Stop reason: HOSPADM

## 2025-06-07 RX ORDER — ROCURONIUM BROMIDE 10 MG/ML
INJECTION, SOLUTION INTRAVENOUS
Status: DISCONTINUED | OUTPATIENT
Start: 2025-06-07 | End: 2025-06-07 | Stop reason: SDUPTHER

## 2025-06-07 RX ORDER — NEOSTIGMINE METHYLSULFATE 1 MG/ML
INJECTION, SOLUTION INTRAVENOUS
Status: DISCONTINUED | OUTPATIENT
Start: 2025-06-07 | End: 2025-06-07 | Stop reason: SDUPTHER

## 2025-06-07 RX ORDER — SODIUM CHLORIDE 0.9 % (FLUSH) 0.9 %
5-40 SYRINGE (ML) INJECTION PRN
Status: DISCONTINUED | OUTPATIENT
Start: 2025-06-07 | End: 2025-06-12 | Stop reason: HOSPADM

## 2025-06-07 RX ORDER — ONDANSETRON 2 MG/ML
INJECTION INTRAMUSCULAR; INTRAVENOUS
Status: DISCONTINUED | OUTPATIENT
Start: 2025-06-07 | End: 2025-06-07 | Stop reason: SDUPTHER

## 2025-06-07 RX ORDER — TAMSULOSIN HYDROCHLORIDE 0.4 MG/1
0.4 CAPSULE ORAL DAILY
Status: CANCELLED | OUTPATIENT
Start: 2025-06-07

## 2025-06-07 RX ORDER — NITROGLYCERIN 5 MG/ML
INJECTION, SOLUTION INTRAVENOUS
Status: DISCONTINUED | OUTPATIENT
Start: 2025-06-07 | End: 2025-06-07 | Stop reason: SDUPTHER

## 2025-06-07 RX ORDER — GLUCAGON 1 MG/ML
1 KIT INJECTION PRN
Status: DISCONTINUED | OUTPATIENT
Start: 2025-06-07 | End: 2025-06-12 | Stop reason: HOSPADM

## 2025-06-07 RX ORDER — METOPROLOL TARTRATE 1 MG/ML
2.5 INJECTION, SOLUTION INTRAVENOUS EVERY 10 MIN PRN
Status: DISCONTINUED | OUTPATIENT
Start: 2025-06-07 | End: 2025-06-12 | Stop reason: HOSPADM

## 2025-06-07 RX ORDER — FENTANYL CITRATE 50 UG/ML
25 INJECTION, SOLUTION INTRAMUSCULAR; INTRAVENOUS
Status: DISCONTINUED | OUTPATIENT
Start: 2025-06-07 | End: 2025-06-10

## 2025-06-07 RX ORDER — 3% SODIUM CHLORIDE 3 G/100ML
25 INJECTION, SOLUTION INTRAVENOUS CONTINUOUS
Status: DISCONTINUED | OUTPATIENT
Start: 2025-06-07 | End: 2025-06-07

## 2025-06-07 RX ORDER — OXYCODONE HYDROCHLORIDE 5 MG/1
5 TABLET ORAL EVERY 4 HOURS PRN
Status: DISCONTINUED | OUTPATIENT
Start: 2025-06-07 | End: 2025-06-12 | Stop reason: HOSPADM

## 2025-06-07 RX ORDER — 3% SODIUM CHLORIDE 3 G/100ML
25 INJECTION, SOLUTION INTRAVENOUS ONCE
Status: DISCONTINUED | OUTPATIENT
Start: 2025-06-07 | End: 2025-06-07

## 2025-06-07 RX ORDER — PROTAMINE SULFATE 10 MG/ML
50 INJECTION, SOLUTION INTRAVENOUS
Status: DISCONTINUED | OUTPATIENT
Start: 2025-06-07 | End: 2025-06-10

## 2025-06-07 RX ORDER — FENTANYL CITRATE 50 UG/ML
50 INJECTION, SOLUTION INTRAMUSCULAR; INTRAVENOUS
Status: DISCONTINUED | OUTPATIENT
Start: 2025-06-07 | End: 2025-06-10

## 2025-06-07 RX ORDER — MIDAZOLAM HYDROCHLORIDE 1 MG/ML
INJECTION, SOLUTION INTRAMUSCULAR; INTRAVENOUS
Status: DISCONTINUED | OUTPATIENT
Start: 2025-06-07 | End: 2025-06-07 | Stop reason: SDUPTHER

## 2025-06-07 RX ORDER — CEFAZOLIN SODIUM 1 G/3ML
INJECTION, POWDER, FOR SOLUTION INTRAMUSCULAR; INTRAVENOUS PRN
Status: DISCONTINUED | OUTPATIENT
Start: 2025-06-07 | End: 2025-06-07 | Stop reason: ALTCHOICE

## 2025-06-07 RX ORDER — POLYETHYLENE GLYCOL 3350 17 G/17G
17 POWDER, FOR SOLUTION ORAL DAILY
Status: DISCONTINUED | OUTPATIENT
Start: 2025-06-08 | End: 2025-06-12 | Stop reason: HOSPADM

## 2025-06-07 RX ORDER — ENOXAPARIN SODIUM 100 MG/ML
40 INJECTION SUBCUTANEOUS DAILY
Status: DISCONTINUED | OUTPATIENT
Start: 2025-06-08 | End: 2025-06-12 | Stop reason: HOSPADM

## 2025-06-07 RX ORDER — SODIUM CHLORIDE 9 MG/ML
INJECTION, SOLUTION INTRAVENOUS
Status: DISCONTINUED | OUTPATIENT
Start: 2025-06-07 | End: 2025-06-07 | Stop reason: SDUPTHER

## 2025-06-07 RX ORDER — DEXMEDETOMIDINE HYDROCHLORIDE 4 UG/ML
.1-1.5 INJECTION, SOLUTION INTRAVENOUS CONTINUOUS
Status: DISCONTINUED | OUTPATIENT
Start: 2025-06-07 | End: 2025-06-09

## 2025-06-07 RX ORDER — BUPIVACAINE HYDROCHLORIDE 5 MG/ML
INJECTION, SOLUTION EPIDURAL; INTRACAUDAL; PERINEURAL
Status: COMPLETED | OUTPATIENT
Start: 2025-06-07 | End: 2025-06-07

## 2025-06-07 RX ORDER — ONDANSETRON 4 MG/1
4 TABLET, ORALLY DISINTEGRATING ORAL EVERY 8 HOURS PRN
Status: DISCONTINUED | OUTPATIENT
Start: 2025-06-07 | End: 2025-06-12 | Stop reason: HOSPADM

## 2025-06-07 RX ORDER — GLYCOPYRROLATE 0.2 MG/ML
INJECTION INTRAMUSCULAR; INTRAVENOUS
Status: DISCONTINUED | OUTPATIENT
Start: 2025-06-07 | End: 2025-06-07 | Stop reason: SDUPTHER

## 2025-06-07 RX ORDER — SODIUM CHLORIDE 0.9 % (FLUSH) 0.9 %
5-40 SYRINGE (ML) INJECTION EVERY 12 HOURS SCHEDULED
Status: DISCONTINUED | OUTPATIENT
Start: 2025-06-07 | End: 2025-06-12 | Stop reason: HOSPADM

## 2025-06-07 RX ORDER — INSULIN LISPRO 100 [IU]/ML
0-12 INJECTION, SOLUTION INTRAVENOUS; SUBCUTANEOUS
Status: DISCONTINUED | OUTPATIENT
Start: 2025-06-10 | End: 2025-06-12 | Stop reason: HOSPADM

## 2025-06-07 RX ORDER — SODIUM CHLORIDE 9 MG/ML
INJECTION, SOLUTION INTRAVENOUS PRN
Status: DISCONTINUED | OUTPATIENT
Start: 2025-06-07 | End: 2025-06-12 | Stop reason: HOSPADM

## 2025-06-07 RX ORDER — FUROSEMIDE 10 MG/ML
20 INJECTION INTRAMUSCULAR; INTRAVENOUS ONCE
Status: COMPLETED | OUTPATIENT
Start: 2025-06-07 | End: 2025-06-07

## 2025-06-07 RX ORDER — CEFAZOLIN SODIUM 1 G/3ML
INJECTION, POWDER, FOR SOLUTION INTRAMUSCULAR; INTRAVENOUS
Status: DISCONTINUED | OUTPATIENT
Start: 2025-06-07 | End: 2025-06-07 | Stop reason: SDUPTHER

## 2025-06-07 RX ORDER — MAGNESIUM SULFATE IN WATER 40 MG/ML
2000 INJECTION, SOLUTION INTRAVENOUS PRN
Status: DISCONTINUED | OUTPATIENT
Start: 2025-06-07 | End: 2025-06-12 | Stop reason: HOSPADM

## 2025-06-07 RX ORDER — FENTANYL CITRATE 0.05 MG/ML
INJECTION, SOLUTION INTRAMUSCULAR; INTRAVENOUS
Status: DISCONTINUED | OUTPATIENT
Start: 2025-06-07 | End: 2025-06-07 | Stop reason: SDUPTHER

## 2025-06-07 RX ORDER — MIDAZOLAM HYDROCHLORIDE 1 MG/ML
1 INJECTION, SOLUTION INTRAMUSCULAR; INTRAVENOUS
Status: DISPENSED | OUTPATIENT
Start: 2025-06-07 | End: 2025-06-08

## 2025-06-07 RX ORDER — ACETAMINOPHEN 325 MG/1
650 TABLET ORAL EVERY 6 HOURS SCHEDULED
Status: DISCONTINUED | OUTPATIENT
Start: 2025-06-08 | End: 2025-06-12 | Stop reason: HOSPADM

## 2025-06-07 RX ORDER — OXYCODONE HYDROCHLORIDE 5 MG/1
10 TABLET ORAL EVERY 4 HOURS PRN
Status: DISCONTINUED | OUTPATIENT
Start: 2025-06-07 | End: 2025-06-12 | Stop reason: HOSPADM

## 2025-06-07 RX ORDER — HYDRALAZINE HYDROCHLORIDE 20 MG/ML
5 INJECTION INTRAMUSCULAR; INTRAVENOUS EVERY 5 MIN PRN
Status: DISCONTINUED | OUTPATIENT
Start: 2025-06-07 | End: 2025-06-12 | Stop reason: HOSPADM

## 2025-06-07 RX ORDER — POTASSIUM CHLORIDE 29.8 MG/ML
20 INJECTION INTRAVENOUS PRN
Status: DISCONTINUED | OUTPATIENT
Start: 2025-06-07 | End: 2025-06-12 | Stop reason: HOSPADM

## 2025-06-07 RX ORDER — OLANZAPINE 5 MG/1
2.5 TABLET, FILM COATED ORAL NIGHTLY
Status: CANCELLED | OUTPATIENT
Start: 2025-06-07

## 2025-06-07 RX ADMIN — Medication 2 AMPULE: at 09:22

## 2025-06-07 RX ADMIN — FENTANYL CITRATE 100 MCG: 50 INJECTION, SOLUTION INTRAMUSCULAR; INTRAVENOUS at 11:18

## 2025-06-07 RX ADMIN — Medication 10 ML: at 21:16

## 2025-06-07 RX ADMIN — FENTANYL CITRATE 100 MCG: 50 INJECTION, SOLUTION INTRAMUSCULAR; INTRAVENOUS at 10:26

## 2025-06-07 RX ADMIN — Medication 1500 MG: at 10:04

## 2025-06-07 RX ADMIN — WATER 2000 MG: 1 INJECTION INTRAMUSCULAR; INTRAVENOUS; SUBCUTANEOUS at 21:09

## 2025-06-07 RX ADMIN — ALBUMIN (HUMAN) 25 G: 12.5 INJECTION, SOLUTION INTRAVENOUS at 16:22

## 2025-06-07 RX ADMIN — SODIUM CHLORIDE, SODIUM LACTATE, POTASSIUM CHLORIDE, AND CALCIUM CHLORIDE: .6; .31; .03; .02 INJECTION, SOLUTION INTRAVENOUS at 09:49

## 2025-06-07 RX ADMIN — MUPIROCIN: 20 OINTMENT TOPICAL at 21:09

## 2025-06-07 RX ADMIN — FENTANYL CITRATE 100 MCG: 50 INJECTION, SOLUTION INTRAMUSCULAR; INTRAVENOUS at 12:27

## 2025-06-07 RX ADMIN — BUPIVACAINE 20 ML: 13.3 INJECTION, SUSPENSION, LIPOSOMAL INFILTRATION at 10:08

## 2025-06-07 RX ADMIN — MIDAZOLAM 2 MG: 1 INJECTION INTRAMUSCULAR; INTRAVENOUS at 11:55

## 2025-06-07 RX ADMIN — AMINOCAPROIC ACID 10000 MG/HR: 250 INJECTION, SOLUTION INTRAVENOUS at 10:20

## 2025-06-07 RX ADMIN — POTASSIUM CHLORIDE 20 MEQ: 29.8 INJECTION, SOLUTION INTRAVENOUS at 16:39

## 2025-06-07 RX ADMIN — PANTOPRAZOLE SODIUM 40 MG: 40 TABLET, DELAYED RELEASE ORAL at 05:16

## 2025-06-07 RX ADMIN — NITROGLYCERIN 50 MCG/MIN: 5 INJECTION, SOLUTION INTRAVENOUS at 09:49

## 2025-06-07 RX ADMIN — HEPARIN SODIUM 5000 UNITS: 1000 INJECTION, SOLUTION INTRAVENOUS; SUBCUTANEOUS at 11:52

## 2025-06-07 RX ADMIN — FENTANYL CITRATE 150 MCG: 50 INJECTION, SOLUTION INTRAMUSCULAR; INTRAVENOUS at 09:58

## 2025-06-07 RX ADMIN — Medication 1500 MG: at 22:11

## 2025-06-07 RX ADMIN — Medication 50 MG: at 14:00

## 2025-06-07 RX ADMIN — CHLORHEXIDINE GLUCONATE 15 ML: 1.2 RINSE ORAL at 05:16

## 2025-06-07 RX ADMIN — MUPIROCIN: 20 OINTMENT TOPICAL at 08:15

## 2025-06-07 RX ADMIN — FENTANYL CITRATE 50 MCG: 50 INJECTION, SOLUTION INTRAMUSCULAR; INTRAVENOUS at 11:55

## 2025-06-07 RX ADMIN — ASPIRIN 300 MG: 300 SUPPOSITORY RECTAL at 22:25

## 2025-06-07 RX ADMIN — SODIUM CHLORIDE 2.5 MG/HR: 9 INJECTION, SOLUTION INTRAVENOUS at 15:56

## 2025-06-07 RX ADMIN — ACETAMINOPHEN 1000 MG: 500 TABLET ORAL at 05:16

## 2025-06-07 RX ADMIN — FENTANYL CITRATE 100 MCG: 50 INJECTION, SOLUTION INTRAMUSCULAR; INTRAVENOUS at 11:15

## 2025-06-07 RX ADMIN — HEPARIN SODIUM 13000 UNITS: 1000 INJECTION, SOLUTION INTRAVENOUS; SUBCUTANEOUS at 12:31

## 2025-06-07 RX ADMIN — PROTAMINE SULFATE 150 MG: 10 INJECTION, SOLUTION INTRAVENOUS at 13:28

## 2025-06-07 RX ADMIN — ALBUTEROL SULFATE 2.5 MG: 2.5 SOLUTION RESPIRATORY (INHALATION) at 19:22

## 2025-06-07 RX ADMIN — NOREPINEPHRINE BITARTRATE 2 MCG/MIN: 64 SOLUTION INTRAVENOUS at 14:05

## 2025-06-07 RX ADMIN — MIDAZOLAM HYDROCHLORIDE 1 MG: 2 INJECTION, SOLUTION INTRAMUSCULAR; INTRAVENOUS at 19:53

## 2025-06-07 RX ADMIN — FENTANYL CITRATE 150 MCG: 50 INJECTION, SOLUTION INTRAMUSCULAR; INTRAVENOUS at 13:53

## 2025-06-07 RX ADMIN — BUPIVACAINE HYDROCHLORIDE 20 ML: 5 INJECTION, SOLUTION EPIDURAL; INTRACAUDAL; PERINEURAL at 10:08

## 2025-06-07 RX ADMIN — WATER 2000 MG: 1 INJECTION INTRAMUSCULAR; INTRAVENOUS; SUBCUTANEOUS at 08:14

## 2025-06-07 RX ADMIN — CEFAZOLIN 2 G: 1 INJECTION, POWDER, FOR SOLUTION INTRAMUSCULAR; INTRAVENOUS at 13:49

## 2025-06-07 RX ADMIN — MIDAZOLAM 2 MG: 1 INJECTION INTRAMUSCULAR; INTRAVENOUS at 09:57

## 2025-06-07 RX ADMIN — SODIUM CHLORIDE: 9 INJECTION, SOLUTION INTRAVENOUS at 10:20

## 2025-06-07 RX ADMIN — GLYCOPYRROLATE 0.4 MG: 0.2 INJECTION, SOLUTION INTRAMUSCULAR; INTRAVENOUS at 15:04

## 2025-06-07 RX ADMIN — ONDANSETRON 4 MG: 2 INJECTION INTRAMUSCULAR; INTRAVENOUS at 14:09

## 2025-06-07 RX ADMIN — SODIUM CHLORIDE 10 MG/HR: 9 INJECTION, SOLUTION INTRAVENOUS at 20:58

## 2025-06-07 RX ADMIN — Medication 0.4 MCG/KG/HR: at 11:42

## 2025-06-07 RX ADMIN — Medication 0.2 MCG/KG/HR: at 20:31

## 2025-06-07 RX ADMIN — Medication 10 ML: at 05:15

## 2025-06-07 RX ADMIN — FENTANYL CITRATE 150 MCG: 50 INJECTION, SOLUTION INTRAMUSCULAR; INTRAVENOUS at 11:09

## 2025-06-07 RX ADMIN — ETOMIDATE 20 MG: 2 INJECTION INTRAVENOUS at 09:58

## 2025-06-07 RX ADMIN — Medication 3 MG: at 15:04

## 2025-06-07 RX ADMIN — CHLORHEXIDINE GLUCONATE 15 ML: 1.2 RINSE ORAL at 21:16

## 2025-06-07 RX ADMIN — ROCURONIUM BROMIDE 50 MG: 50 INJECTION, SOLUTION INTRAVENOUS at 11:55

## 2025-06-07 RX ADMIN — SODIUM CHLORIDE 0.5 UNITS/HR: 9 INJECTION, SOLUTION INTRAVENOUS at 15:35

## 2025-06-07 RX ADMIN — MIDAZOLAM 2 MG: 1 INJECTION INTRAMUSCULAR; INTRAVENOUS at 14:00

## 2025-06-07 RX ADMIN — ASPIRIN 81 MG: 81 TABLET, COATED ORAL at 05:16

## 2025-06-07 RX ADMIN — ROCURONIUM BROMIDE 100 MG: 50 INJECTION, SOLUTION INTRAVENOUS at 09:58

## 2025-06-07 RX ADMIN — FUROSEMIDE 20 MG: 10 INJECTION, SOLUTION INTRAMUSCULAR; INTRAVENOUS at 20:47

## 2025-06-07 RX ADMIN — HEPARIN SODIUM 5000 UNITS: 1000 INJECTION, SOLUTION INTRAVENOUS; SUBCUTANEOUS at 13:09

## 2025-06-07 RX ADMIN — FENTANYL CITRATE 100 MCG: 50 INJECTION, SOLUTION INTRAMUSCULAR; INTRAVENOUS at 13:40

## 2025-06-07 RX ADMIN — HEPARIN SODIUM 4000 UNITS: 1000 INJECTION INTRAVENOUS; SUBCUTANEOUS at 05:18

## 2025-06-07 RX ADMIN — Medication 50 MG: at 09:58

## 2025-06-07 RX ADMIN — FENTANYL CITRATE 25 MCG: 50 INJECTION, SOLUTION INTRAMUSCULAR; INTRAVENOUS at 18:50

## 2025-06-07 ASSESSMENT — PULMONARY FUNCTION TESTS
PIF_VALUE: 35
PIF_VALUE: 33
PIF_VALUE: 28
PIF_VALUE: 24
PIF_VALUE: 32
PIF_VALUE: 18
PIF_VALUE: 18
PIF_VALUE: 28
PIF_VALUE: 33
PIF_VALUE: 33
PIF_VALUE: 27

## 2025-06-07 NOTE — ANESTHESIA PROCEDURE NOTES
Central Venous Line:    A central venous line was placed using surface landmarks, in the OR for the following indication(s): central venous access and CVP monitoring.6/7/2025 10:02 AM6/7/2025 10:08 AM    Sterility preparation included the following: provider used sterile gloves, gown, hat and mask and maximum sterile barriers used during central venous catheter insertion.    The patient was placed in Trendelenburg position.The right internal jugular vein was prepped.    The site was prepped with Chloraprep.  A 9 Fr (size), 10 (length), introducer triple lumen was placed.    During the procedure, the following specific steps were taken: target vein identified, needle advanced into vein and blood aspirated and guidewire advanced into vein.    Intravenous verification was obtained by venous blood return.    Post insertion care included: all ports aspirated, all ports flushed easily, guidewire removed intact, Biopatch applied, line sutured in place and dressing applied.    During the procedure the patient experienced: patient tolerated procedure well with no complications and EBL < 5mL.      Outcomes: uncomplicated and patient tolerated procedure well  Anesthesia type: general..No  Staffing  Performed: Anesthesiologist   Anesthesiologist: Baldo Pathak MD  Performed by: Baldo Pathak MD  Authorized by: Baldo Pathak MD    Preanesthetic Checklist  Completed: patient identified, timeout performed and monitors applied/VS acknowledged

## 2025-06-07 NOTE — CONSENT
Informed Consent for Blood Component Transfusion Note    I have discussed with the patient the rationale for blood component transfusion; its benefits in treating or preventing fatigue, organ damage, or death; and its risk which includes mild transfusion reactions, rare risk of blood borne infection, or more serious but rare reactions. I have discussed the alternatives to transfusion, including the risk and consequences of not receiving transfusion. The patient had an opportunity to ask questions and had agreed to proceed with transfusion of blood components.    Electronically signed by IBAN Fraire CNP on 6/7/25 at 7:48 AM EDT

## 2025-06-07 NOTE — OP NOTE
that site proximally to just above its bifurcation closer to the diaphragm and 5000u of IV heparin was given prior to detaching it distally. The YADIEL was taken shorter than the left side to preserve more sternal incision blood supply inferiorly, and disconnected as a free graft by detaching it proximally and distally; it was clipped and suture ligated with pledgeted 5-0 Prolene proximally. Chest drains were placed into the pleural spaces bilaterally. Next, the sternal retractor was placed and the thymus and pericardium opened and a standard pericardial well created. A Y-graft was created between the fRIMA and LIMA where the LIMA crossed the transected pericardium and performed with 7-0 Prolene with a good result. We assessed the heart and felt it would be possible to proceed with off-pump grafting. We used the [employer] Off-Pump CABG system and stabilized the heart for fRIMA limb of the Y-graft to the OM, a 2mm good target, using 7-0 Prolene and the flow was good: ~30cc/min with PI 3.1 at an SBP 95mmHg. Next, we grafted the LIMA limb to the mid-LAD where it was a ~2mm good target and the flow was good, 20-25cc/min with PI 1.3. The common LIMA inflow to the two left-sided targets had flow of 45cc/min with PI 2.6. MENDY showed unchanged and preserved systolic LV function and she has mild AI (but with the IABP). Ventricular pacing wires were placed as well as a mediastinal chest tube. Thymus and pericardium were closed over the heart as much as possible and the chest was closed with four #5 stainless steel cables. The incision was pulse-lavage irrigated and closed in multiple layers and sterile dressings applied. She was taken to the CVICU in critical but stable condition.     Complications: None     Copies to:  Dr. Gomez Perry

## 2025-06-07 NOTE — ANESTHESIA POSTPROCEDURE EVALUATION
Department of Anesthesiology  Postprocedure Note    Patient: Iris Becerra  MRN: [id number]  YOB: 1960  Date of evaluation: 6/7/2025    Procedure Summary       Date: 06/07/25 Room / Location: [place] [address] / [facility]    Anesthesia Start: 0949 Anesthesia Stop: 1505    Procedure: CORONARY ARTERY BYPASS GRAFT TIMES TWO, OFF PUMP, BILATERAL INTERNAL MAMMARY ARTERIES, LEFT SAPHENOUS VEIN GRAFT EXPLORATION, TRANSESOPHAGEAL ECHOCARDIOGRAM, POSTERIOR PERICARDIOTOMY,  BILATERAL PECTORALIS BLOCK (Chest) Diagnosis:       CAD (coronary artery disease)      (CAD (coronary artery disease) [I25.10])    Surgeons: Candido Mustafa MD Responsible Provider: Baldo Pathak MD    Anesthesia Type: general ASA Status: 4            Anesthesia Type: No value filed.    Benedicto Phase I: Benedicto Score: 10    Benedicto Phase II:      Anesthesia Post Evaluation    Patient location during evaluation: ICU  Patient participation: complete - patient cannot participate  Level of consciousness: sedated and ventilated  Pain score: 0  Airway patency: patent  Nausea & Vomiting: no nausea and no vomiting  Cardiovascular status: vasoactive/inotropes  Respiratory status: acceptable and ventilator  Hydration status: stable    No notable events documented.

## 2025-06-07 NOTE — ANESTHESIA PROCEDURE NOTES
Peripheral Block    Patient location during procedure: OR  Reason for block: post-op pain management and at surgeon's request  Start time: 6/7/2025 10:08 AM  End time: 6/7/2025 10:13 AM  Staffing  Performed: anesthesiologist   Anesthesiologist: Baldo Pathak MD  Performed by: Baldo Pathak MD  Authorized by: Baldo Pathak MD    Preanesthetic Checklist  Completed: patient identified, IV checked, site marked, risks and benefits discussed, surgical/procedural consents, equipment checked, pre-op evaluation, timeout performed, anesthesia consent given, oxygen available and monitors applied/VS acknowledged  Peripheral Block   Patient position: sitting  Prep: ChloraPrep  Provider prep: mask and sterile gloves  Patient monitoring: cardiac monitor, continuous pulse ox, frequent blood pressure checks and IV access  Block type: PECS II  Laterality: bilateral  Injection technique: single-shot  Guidance: ultrasound guided  Local infiltration: lidocaine  Infiltration strength: 1 %  Local infiltration: lidocaine  Dose: 3 mL    Needle   Needle type: insulated echogenic nerve stimulator needle   Needle gauge: 21 G  Needle localization: ultrasound guidance  Needle length: 10 cm  Assessment   Injection assessment: negative aspiration for heme, no paresthesia on injection and local visualized surrounding nerve on ultrasound  Paresthesia pain: none  Slow fractionated injection: yes  Hemodynamics: stable  Outcomes: patient tolerated procedure well    Medications Administered  BUPivacaine (MARCAINE) PF injection 0.5% - Perineural   20 mL - 6/7/2025 10:08:00 AM  BUPivacaine liposome (EXPAREL) injection 1.3% - Perineural   20 mL - 6/7/2025 10:08:00 AM

## 2025-06-07 NOTE — ANESTHESIA PROCEDURE NOTES
Procedure Performed: MENDY       Start Time:  6/7/2025 10:40 AM       End Time:   6/7/2025 11:01 AM    Anesthesia Information  Performed Personally        Preanesthesia Checklist:  Patient identified, IV assessed, risks and benefits discussed, monitors and equipment assessed, procedure being performed at surgeon's request and anesthesia consent obtained.    General Procedure Information  Diagnostic Indications for Echo:  hemodynamic monitoring  Physician Requesting Echo: Candido Mustafa MD  Location performed:  OR  Intubated  Bite block placed  Heart visualized  Probe Insertion:  Easy  Probe Type:  3D  Modalities:  2D, M-mode, pulse wave Doppler, continuous wave Doppler and color flow mapping    Echocardiographic and Doppler Measurements    Ventricles    Ventricle  Cavity Size  Cavity          Dimension  Hypertrophy  Thrombus  Global FXN  EF    RV  normal    No  No  normal      LV  normal    No  No  normal (50-70%)  55%           Valves     Valves  Annulus  Stenosis Measurements   Regurg  Leaflet   Morph  Leaflet   Motion Valve Comments    Aortic normal Stenosis none.            mild   normal normal       Mitral normal none             none normal normal    Tricuspid normal   not present         trace   normal   normal      Pulmonic normal   not present         none              Aorta     Aorta  Size  Diam(cm)  Dissection De La Cruz Classification    Ascending normal         Arch normal        Descending normal    Dissection not present.                Atria     Size  SEC (smoke)  Thrombus  Tumor  Device    Rt Atrium normal        Lt Atrium normal         Left Atrial Appendage: normal        Septa    Interatrial Septal Morphology: normal          Interventricular Septal Morphology: normal          Diastolic Function Measurements:  Diastolic Dysfunction Grade=  E=  ms  A=  ms  E/A Ratio=  1.2  DT=  ms  S/D=   IVRT=    Other Findings  Pericardium:  normal  Pleural Effusion:  none  Pulmonary Arteries:  normal  Pulmonary

## 2025-06-07 NOTE — PLAN OF CARE
Problem: Pain  Goal: Verbalizes/displays adequate comfort level or baseline comfort level  Outcome: Progressing     Problem: Discharge Planning  Goal: Discharge to home or other facility with appropriate resources  Outcome: Progressing     Problem: Safety - Adult  Goal: Free from fall injury  Outcome: Progressing     Problem: Skin/Tissue Integrity  Goal: Skin integrity remains intact  Description: 1.  Monitor for areas of redness and/or skin breakdown2.  Assess vascular access sites hourly3.  Every 4-6 hours minimum:  Change oxygen saturation probe site4.  Every 4-6 hours:  If on nasal continuous positive airway pressure, respiratory therapy assess nares and determine need for appliance change or resting period  Outcome: Progressing     Problem: Cardiovascular - Adult  Goal: Maintains optimal cardiac output and hemodynamic stability  Outcome: Progressing  Goal: Absence of cardiac dysrhythmias or at baseline  Outcome: Progressing

## 2025-06-07 NOTE — ANESTHESIA PRE PROCEDURE
Department of Anesthesiology  Preprocedure Note       Name:  Iris Becerra   Age:  64 y.o.  :  1960                                          MRN:  [id number]         Date:  2025      Surgeon: Surgeon(s):  Candido Mustafa MD    Procedure: Procedure(s):  OFF PUMP, CORONARY ARTERY BYPASS GRAFT, ALL OTHER INDICATED PROCEDURES, SAPHENOUS VEIN GRAFT HARVEST, LEFT ATRIAL APPENDAGE CLIP PLACEMENT, POSSIBLE INTRA AORTIC BALLON PUMP REMOVAL NOTE:  BILATERAL PECTORALIS BLOCK    Medications prior to admission:   Prior to Admission medications    Medication Sig Start Date End Date Taking? Authorizing Provider   apixaban starter pack (ELIQUIS) 5 MG TBPK tablet Take 1 tablet by mouth 2 times daily 25  Yes Eliana Andrew MD   ondansetron (ZOFRAN) 8 MG tablet Take 1 tablet by mouth every 8 hours as needed 25  Yes Eliana Andrew MD   hydroCHLOROthiazide 12.5 MG capsule Take 1 capsule by mouth every morning 25  Yes Samir Hope APRN - CNP   simvastatin (ZOCOR) 10 MG tablet Take 1 tablet by mouth nightly 25  Yes Samir Hope APRN - CNP   OLANZapine (ZYPREXA) 2.5 MG tablet Take 1 tablet by mouth nightly 25   ProviderEliana MD   SUMATRIPTAN SUCCINATE PO Sumatriptan Oral  orally 1.0  as directed on dose pack  take 1 tab at onset of headache; if no relief may repeat 1 tab in 2hr; max = 4 tabs/day (24hr)   Active    Provider, MD Eliana       Current medications:    Current Facility-Administered Medications   Medication Dose Route Frequency Provider Last Rate Last Admin   • 0.9 % sodium chloride infusion   IntraVENous PRN Chris Patterson APRN - CNP       • aminocaproic acid (AMICAR) 10,000 mg in sodium chloride 0.9 % 500 mL infusion  1,000 mg/hr IntraVENous Continuous Baldo Pathak MD       • dexmedeTOMIDine (PRECEDEX) 400 mcg in sodium chloride 0.9 % 100 mL infusion  0.1-1.5 mcg/kg/hr IntraVENous Continuous Baldo Pathak MD       • insulin regular (HumuLIN

## 2025-06-07 NOTE — PLAN OF CARE
Problem: Cardiovascular - Adult  Goal: Maintains optimal cardiac output and hemodynamic stability  Outcome: Progressing  Flowsheets (Taken 6/7/2025 1951)  Maintains optimal cardiac output and hemodynamic stability:   Monitor blood pressure and heart rate   Monitor urine output and notify Licensed Independent Practitioner for values outside of normal range   Assess for signs of decreased cardiac output   Administer fluid and/or volume expanders as ordered   Administer vasoactive medications as ordered  Goal: Absence of cardiac dysrhythmias or at baseline  Outcome: Progressing  Flowsheets (Taken 6/7/2025 1951)  Absence of cardiac dysrhythmias or at baseline:   Monitor cardiac rate and rhythm   Assess for signs of decreased cardiac output   Administer antiarrhythmia medication and electrolyte replacement as ordered     Problem: Genitourinary - Adult  Goal: Urinary catheter remains patent  Outcome: Progressing  Flowsheets (Taken 6/7/2025 1951)  Urinary catheter remains patent: Assess patency of urinary catheter

## 2025-06-08 ENCOUNTER — APPOINTMENT (OUTPATIENT)
Dept: GENERAL RADIOLOGY | Age: 65
DRG: 233 | End: 2025-06-08
Payer: MEDICARE

## 2025-06-08 LAB
ANION GAP SERPL CALCULATED.3IONS-SCNC: 11 MMOL/L (ref 3–16)
BASE EXCESS BLDA CALC-SCNC: -3 MMOL/L (ref -3–3)
BASE EXCESS BLDA CALC-SCNC: 0 MMOL/L (ref -3–3)
BASE EXCESS BLDA CALC-SCNC: 1 MMOL/L (ref -3–3)
BASE EXCESS BLDA CALC-SCNC: 1 MMOL/L (ref -3–3)
BASOPHILS # BLD: 0 K/UL (ref 0–0.2)
BASOPHILS NFR BLD: 0.3 %
BUN SERPL-MCNC: 17 MG/DL (ref 7–20)
CA-I BLD-SCNC: 0.99 MMOL/L (ref 1.12–1.32)
CA-I BLD-SCNC: 1.12 MMOL/L (ref 1.12–1.32)
CA-I BLD-SCNC: 1.14 MMOL/L (ref 1.12–1.32)
CA-I BLD-SCNC: 1.15 MMOL/L (ref 1.12–1.32)
CA-I BLD-SCNC: 1.19 MMOL/L (ref 1.12–1.32)
CALCIUM SERPL-MCNC: 7.9 MG/DL (ref 8.3–10.6)
CHLORIDE SERPL-SCNC: 109 MMOL/L (ref 99–110)
CO2 BLDA-SCNC: 24 MMOL/L
CO2 BLDA-SCNC: 25 MMOL/L
CO2 BLDA-SCNC: 26 MMOL/L
CO2 BLDA-SCNC: 27 MMOL/L
CO2 SERPL-SCNC: 20 MMOL/L (ref 21–32)
CREAT SERPL-MCNC: 1 MG/DL (ref 0.6–1.2)
DEPRECATED RDW RBC AUTO: 21.9 % (ref 12.4–15.4)
EOSINOPHIL # BLD: 0 K/UL (ref 0–0.6)
EOSINOPHIL NFR BLD: 0.3 %
GFR SERPLBLD CREATININE-BSD FMLA CKD-EPI: 62 ML/MIN/{1.73_M2}
GLUCOSE BLD-MCNC: 101 MG/DL (ref 70–99)
GLUCOSE BLD-MCNC: 106 MG/DL (ref 70–99)
GLUCOSE BLD-MCNC: 107 MG/DL (ref 70–99)
GLUCOSE BLD-MCNC: 122 MG/DL (ref 70–99)
GLUCOSE BLD-MCNC: 133 MG/DL (ref 70–99)
GLUCOSE BLD-MCNC: 98 MG/DL (ref 70–99)
GLUCOSE SERPL-MCNC: 102 MG/DL (ref 70–99)
HCO3 BLDA-SCNC: 22.5 MMOL/L (ref 21–29)
HCO3 BLDA-SCNC: 24.2 MMOL/L (ref 21–29)
HCO3 BLDA-SCNC: 25.1 MMOL/L (ref 21–29)
HCO3 BLDA-SCNC: 25.4 MMOL/L (ref 21–29)
HCT VFR BLD AUTO: 24.2 % (ref 36–48)
HCT VFR BLD AUTO: 25 % (ref 36–48)
HCT VFR BLD AUTO: 27 % (ref 36–48)
HCT VFR BLD AUTO: 29 % (ref 36–48)
HCT VFR BLD AUTO: 29 % (ref 36–48)
HGB BLD CALC-MCNC: 10 GM/DL (ref 12–16)
HGB BLD CALC-MCNC: 8.6 GM/DL (ref 12–16)
HGB BLD CALC-MCNC: 9.3 GM/DL (ref 12–16)
HGB BLD CALC-MCNC: 9.7 GM/DL (ref 12–16)
HGB BLD-MCNC: 8.1 G/DL (ref 12–16)
INR PPP: 1.1 (ref 0.85–1.15)
LACTATE BLD-SCNC: 1.2 MMOL/L (ref 0.4–2)
LACTATE BLD-SCNC: 1.41 MMOL/L (ref 0.4–2)
LACTATE BLD-SCNC: 1.67 MMOL/L (ref 0.4–2)
LACTATE BLD-SCNC: 2.34 MMOL/L (ref 0.4–2)
LYMPHOCYTES # BLD: 0.3 K/UL (ref 1–5.1)
LYMPHOCYTES NFR BLD: 4.5 %
MAGNESIUM SERPL-MCNC: 1.6 MG/DL (ref 1.8–2.4)
MCH RBC QN AUTO: 32.6 PG (ref 26–34)
MCHC RBC AUTO-ENTMCNC: 33.5 G/DL (ref 31–36)
MCV RBC AUTO: 97.3 FL (ref 80–100)
MONOCYTES # BLD: 1.1 K/UL (ref 0–1.3)
MONOCYTES NFR BLD: 15.2 %
NEUTROPHILS # BLD: 5.9 K/UL (ref 1.7–7.7)
NEUTROPHILS NFR BLD: 79.7 %
PCO2 BLDA: 35.5 MM HG (ref 35–45)
PCO2 BLDA: 35.6 MM HG (ref 35–45)
PCO2 BLDA: 37.6 MM HG (ref 35–45)
PCO2 BLDA: 37.6 MM HG (ref 35–45)
PERFORMED ON: ABNORMAL
PERFORMED ON: NORMAL
PH BLDA: 7.39 [PH] (ref 7.35–7.45)
PH BLDA: 7.44 [PH] (ref 7.35–7.45)
PH BLDA: 7.44 [PH] (ref 7.35–7.45)
PH BLDA: 7.46 [PH] (ref 7.35–7.45)
PH BLDV: 7.44 [PH] (ref 7.35–7.45)
PHOSPHATE SERPL-MCNC: 3.6 MG/DL (ref 2.5–4.9)
PLATELET # BLD AUTO: 97 K/UL (ref 135–450)
PMV BLD AUTO: 8.2 FL (ref 5–10.5)
PO2 BLDA: 63.2 MM HG (ref 75–108)
PO2 BLDA: 63.8 MM HG (ref 75–108)
PO2 BLDA: 77 MM HG (ref 75–108)
PO2 BLDA: 81.1 MM HG (ref 75–108)
POC SAMPLE TYPE: ABNORMAL
POTASSIUM BLD-SCNC: 4.5 MMOL/L (ref 3.5–5.1)
POTASSIUM BLD-SCNC: 4.7 MMOL/L (ref 3.5–5.1)
POTASSIUM BLD-SCNC: 4.8 MMOL/L (ref 3.5–5.1)
POTASSIUM BLD-SCNC: 4.9 MMOL/L (ref 3.5–5.1)
POTASSIUM SERPL-SCNC: 5 MMOL/L (ref 3.5–5.1)
PROTHROMBIN TIME: 14.4 SEC (ref 11.9–14.9)
RBC # BLD AUTO: 2.48 M/UL (ref 4–5.2)
SAO2 % BLDA: 92 % (ref 93–100)
SAO2 % BLDA: 93 % (ref 93–100)
SAO2 % BLDA: 96 % (ref 93–100)
SAO2 % BLDA: 96 % (ref 93–100)
SODIUM BLD-SCNC: 141 MMOL/L (ref 136–145)
SODIUM BLD-SCNC: 145 MMOL/L (ref 136–145)
SODIUM BLD-SCNC: 146 MMOL/L (ref 136–145)
SODIUM BLD-SCNC: 146 MMOL/L (ref 136–145)
SODIUM SERPL-SCNC: 140 MMOL/L (ref 136–145)
WBC # BLD AUTO: 7.4 K/UL (ref 4–11)

## 2025-06-08 PROCEDURE — 99024 POSTOP FOLLOW-UP VISIT: CPT

## 2025-06-08 PROCEDURE — 82330 ASSAY OF CALCIUM: CPT

## 2025-06-08 PROCEDURE — 83735 ASSAY OF MAGNESIUM: CPT

## 2025-06-08 PROCEDURE — 80048 BASIC METABOLIC PNL TOTAL CA: CPT

## 2025-06-08 PROCEDURE — 2100000000 HC CCU R&B

## 2025-06-08 PROCEDURE — 84132 ASSAY OF SERUM POTASSIUM: CPT

## 2025-06-08 PROCEDURE — 82947 ASSAY GLUCOSE BLOOD QUANT: CPT

## 2025-06-08 PROCEDURE — 6360000002 HC RX W HCPCS

## 2025-06-08 PROCEDURE — 84100 ASSAY OF PHOSPHORUS: CPT

## 2025-06-08 PROCEDURE — 6360000002 HC RX W HCPCS: Performed by: THORACIC SURGERY (CARDIOTHORACIC VASCULAR SURGERY)

## 2025-06-08 PROCEDURE — 94669 MECHANICAL CHEST WALL OSCILL: CPT

## 2025-06-08 PROCEDURE — 85025 COMPLETE CBC W/AUTO DIFF WBC: CPT

## 2025-06-08 PROCEDURE — 6370000000 HC RX 637 (ALT 250 FOR IP)

## 2025-06-08 PROCEDURE — 94761 N-INVAS EAR/PLS OXIMETRY MLT: CPT

## 2025-06-08 PROCEDURE — 83605 ASSAY OF LACTIC ACID: CPT

## 2025-06-08 PROCEDURE — 2580000003 HC RX 258

## 2025-06-08 PROCEDURE — 6370000000 HC RX 637 (ALT 250 FOR IP): Performed by: THORACIC SURGERY (CARDIOTHORACIC VASCULAR SURGERY)

## 2025-06-08 PROCEDURE — 71045 X-RAY EXAM CHEST 1 VIEW: CPT

## 2025-06-08 PROCEDURE — 2580000003 HC RX 258: Performed by: THORACIC SURGERY (CARDIOTHORACIC VASCULAR SURGERY)

## 2025-06-08 PROCEDURE — 2140000000 HC CCU INTERMEDIATE R&B

## 2025-06-08 PROCEDURE — 82803 BLOOD GASES ANY COMBINATION: CPT

## 2025-06-08 PROCEDURE — 2500000003 HC RX 250 WO HCPCS

## 2025-06-08 PROCEDURE — 84295 ASSAY OF SERUM SODIUM: CPT

## 2025-06-08 PROCEDURE — 85610 PROTHROMBIN TIME: CPT

## 2025-06-08 PROCEDURE — 2700000000 HC OXYGEN THERAPY PER DAY

## 2025-06-08 PROCEDURE — 85014 HEMATOCRIT: CPT

## 2025-06-08 PROCEDURE — 94640 AIRWAY INHALATION TREATMENT: CPT

## 2025-06-08 RX ORDER — FUROSEMIDE 10 MG/ML
20 INJECTION INTRAMUSCULAR; INTRAVENOUS ONCE
Status: COMPLETED | OUTPATIENT
Start: 2025-06-08 | End: 2025-06-08

## 2025-06-08 RX ORDER — METOPROLOL TARTRATE 25 MG/1
25 TABLET, FILM COATED ORAL 2 TIMES DAILY
Status: DISCONTINUED | OUTPATIENT
Start: 2025-06-08 | End: 2025-06-12 | Stop reason: HOSPADM

## 2025-06-08 RX ORDER — ASPIRIN 81 MG/1
81 TABLET ORAL DAILY
Status: DISCONTINUED | OUTPATIENT
Start: 2025-06-08 | End: 2025-06-12 | Stop reason: HOSPADM

## 2025-06-08 RX ORDER — METOPROLOL TARTRATE 25 MG/1
12.5 TABLET, FILM COATED ORAL 2 TIMES DAILY
Status: DISCONTINUED | OUTPATIENT
Start: 2025-06-08 | End: 2025-06-08

## 2025-06-08 RX ADMIN — METOPROLOL TARTRATE 25 MG: 25 TABLET, FILM COATED ORAL at 21:08

## 2025-06-08 RX ADMIN — FENTANYL CITRATE 50 MCG: 50 INJECTION INTRAMUSCULAR; INTRAVENOUS at 02:34

## 2025-06-08 RX ADMIN — METOPROLOL TARTRATE 12.5 MG: 25 TABLET, FILM COATED ORAL at 12:23

## 2025-06-08 RX ADMIN — PANTOPRAZOLE SODIUM 40 MG: 40 TABLET, DELAYED RELEASE ORAL at 09:01

## 2025-06-08 RX ADMIN — FENTANYL CITRATE 50 MCG: 50 INJECTION INTRAMUSCULAR; INTRAVENOUS at 03:34

## 2025-06-08 RX ADMIN — DOCUSATE SODIUM 50MG AND SENNOSIDES 8.6MG 1 TABLET: 8.6; 5 TABLET, FILM COATED ORAL at 21:08

## 2025-06-08 RX ADMIN — ENOXAPARIN SODIUM 40 MG: 100 INJECTION SUBCUTANEOUS at 09:01

## 2025-06-08 RX ADMIN — IRON SUCROSE 200 MG: 20 INJECTION, SOLUTION INTRAVENOUS at 11:23

## 2025-06-08 RX ADMIN — ASPIRIN 81 MG: 81 TABLET, COATED ORAL at 11:47

## 2025-06-08 RX ADMIN — MAGNESIUM SULFATE HEPTAHYDRATE 2000 MG: 40 INJECTION, SOLUTION INTRAVENOUS at 06:56

## 2025-06-08 RX ADMIN — FENTANYL CITRATE 50 MCG: 50 INJECTION INTRAMUSCULAR; INTRAVENOUS at 05:48

## 2025-06-08 RX ADMIN — OXYCODONE 10 MG: 5 TABLET ORAL at 15:37

## 2025-06-08 RX ADMIN — ALBUTEROL SULFATE 2.5 MG: 2.5 SOLUTION RESPIRATORY (INHALATION) at 08:01

## 2025-06-08 RX ADMIN — MUPIROCIN: 20 OINTMENT TOPICAL at 09:26

## 2025-06-08 RX ADMIN — MUPIROCIN: 20 OINTMENT TOPICAL at 21:32

## 2025-06-08 RX ADMIN — CHLORHEXIDINE GLUCONATE 15 ML: 1.2 RINSE ORAL at 09:01

## 2025-06-08 RX ADMIN — FUROSEMIDE 20 MG: 10 INJECTION, SOLUTION INTRAVENOUS at 21:31

## 2025-06-08 RX ADMIN — Medication 1500 MG: at 09:29

## 2025-06-08 RX ADMIN — Medication 1500 MG: at 21:40

## 2025-06-08 RX ADMIN — ALBUTEROL SULFATE 2.5 MG: 2.5 SOLUTION RESPIRATORY (INHALATION) at 20:09

## 2025-06-08 RX ADMIN — FUROSEMIDE 20 MG: 10 INJECTION, SOLUTION INTRAMUSCULAR; INTRAVENOUS at 11:47

## 2025-06-08 RX ADMIN — Medication 10 ML: at 21:08

## 2025-06-08 RX ADMIN — WATER 2000 MG: 1 INJECTION INTRAMUSCULAR; INTRAVENOUS; SUBCUTANEOUS at 06:12

## 2025-06-08 RX ADMIN — WATER 2000 MG: 1 INJECTION INTRAMUSCULAR; INTRAVENOUS; SUBCUTANEOUS at 15:37

## 2025-06-08 RX ADMIN — OXYCODONE 10 MG: 5 TABLET ORAL at 09:01

## 2025-06-08 RX ADMIN — ONDANSETRON 4 MG: 2 INJECTION, SOLUTION INTRAMUSCULAR; INTRAVENOUS at 21:08

## 2025-06-08 RX ADMIN — ACETAMINOPHEN 650 MG: 325 TABLET ORAL at 12:23

## 2025-06-08 RX ADMIN — CHLORHEXIDINE GLUCONATE 15 ML: 1.2 RINSE ORAL at 21:08

## 2025-06-08 RX ADMIN — WATER 2000 MG: 1 INJECTION INTRAMUSCULAR; INTRAVENOUS; SUBCUTANEOUS at 21:09

## 2025-06-08 RX ADMIN — ACETAMINOPHEN 650 MG: 325 TABLET ORAL at 09:01

## 2025-06-08 ASSESSMENT — PAIN DESCRIPTION - ORIENTATION
ORIENTATION: ANTERIOR
ORIENTATION: ANTERIOR

## 2025-06-08 ASSESSMENT — PAIN SCALES - GENERAL
PAINLEVEL_OUTOF10: 8
PAINLEVEL_OUTOF10: 6
PAINLEVEL_OUTOF10: 7
PAINLEVEL_OUTOF10: 0
PAINLEVEL_OUTOF10: 7
PAINLEVEL_OUTOF10: 5
PAINLEVEL_OUTOF10: 7
PAINLEVEL_OUTOF10: 5
PAINLEVEL_OUTOF10: 7
PAINLEVEL_OUTOF10: 1

## 2025-06-08 ASSESSMENT — PAIN DESCRIPTION - DESCRIPTORS
DESCRIPTORS: SHOOTING;THROBBING;ACHING
DESCRIPTORS: SORE;PENETRATING

## 2025-06-08 ASSESSMENT — PAIN DESCRIPTION - LOCATION
LOCATION: CHEST
LOCATION: MEDIASTINUM
LOCATION: MEDIASTINUM
LOCATION: CHEST
LOCATION: CHEST;BACK

## 2025-06-09 ENCOUNTER — APPOINTMENT (OUTPATIENT)
Dept: GENERAL RADIOLOGY | Age: 65
DRG: 233 | End: 2025-06-09
Payer: MEDICARE

## 2025-06-09 LAB
ACTIVATED CLOTTING TIME: 105 SEC (ref 99–130)
ACTIVATED CLOTTING TIME: 116 SEC (ref 99–130)
ACTIVATED CLOTTING TIME: 294 SEC (ref 99–130)
ACTIVATED CLOTTING TIME: 326 SEC (ref 99–130)
ANION GAP SERPL CALCULATED.3IONS-SCNC: 12 MMOL/L (ref 3–16)
BLOOD BANK DISPENSE STATUS: NORMAL
BLOOD BANK PRODUCT CODE: NORMAL
BPU ID: NORMAL
BUN SERPL-MCNC: 20 MG/DL (ref 7–20)
CALCIUM SERPL-MCNC: 8.2 MG/DL (ref 8.3–10.6)
CHLORIDE SERPL-SCNC: 104 MMOL/L (ref 99–110)
CO2 SERPL-SCNC: 24 MMOL/L (ref 21–32)
CREAT SERPL-MCNC: 1 MG/DL (ref 0.6–1.2)
DEPRECATED RDW RBC AUTO: 22 % (ref 12.4–15.4)
DESCRIPTION BLOOD BANK: NORMAL
EST. AVERAGE GLUCOSE BLD GHB EST-MCNC: 102.5 MG/DL
GFR SERPLBLD CREATININE-BSD FMLA CKD-EPI: 62 ML/MIN/{1.73_M2}
GLUCOSE SERPL-MCNC: 118 MG/DL (ref 70–99)
HBA1C MFR BLD: 5.2 %
HCT VFR BLD AUTO: 22.8 % (ref 36–48)
HGB BLD-MCNC: 7.6 G/DL (ref 12–16)
INR PPP: 1.19 (ref 0.85–1.15)
MAGNESIUM SERPL-MCNC: 1.9 MG/DL (ref 1.8–2.4)
MCH RBC QN AUTO: 32.2 PG (ref 26–34)
MCHC RBC AUTO-ENTMCNC: 33.1 G/DL (ref 31–36)
MCV RBC AUTO: 97.1 FL (ref 80–100)
PLATELET # BLD AUTO: 97 K/UL (ref 135–450)
PMV BLD AUTO: 8.5 FL (ref 5–10.5)
POTASSIUM SERPL-SCNC: 4 MMOL/L (ref 3.5–5.1)
PROTHROMBIN TIME: 15.3 SEC (ref 11.9–14.9)
RBC # BLD AUTO: 2.35 M/UL (ref 4–5.2)
SODIUM SERPL-SCNC: 140 MMOL/L (ref 136–145)
WBC # BLD AUTO: 8.5 K/UL (ref 4–11)

## 2025-06-09 PROCEDURE — 2700000000 HC OXYGEN THERAPY PER DAY

## 2025-06-09 PROCEDURE — 99024 POSTOP FOLLOW-UP VISIT: CPT

## 2025-06-09 PROCEDURE — 94640 AIRWAY INHALATION TREATMENT: CPT

## 2025-06-09 PROCEDURE — 71045 X-RAY EXAM CHEST 1 VIEW: CPT

## 2025-06-09 PROCEDURE — 2580000003 HC RX 258: Performed by: THORACIC SURGERY (CARDIOTHORACIC VASCULAR SURGERY)

## 2025-06-09 PROCEDURE — 80048 BASIC METABOLIC PNL TOTAL CA: CPT

## 2025-06-09 PROCEDURE — 6360000002 HC RX W HCPCS

## 2025-06-09 PROCEDURE — 6370000000 HC RX 637 (ALT 250 FOR IP): Performed by: THORACIC SURGERY (CARDIOTHORACIC VASCULAR SURGERY)

## 2025-06-09 PROCEDURE — 94669 MECHANICAL CHEST WALL OSCILL: CPT

## 2025-06-09 PROCEDURE — 94761 N-INVAS EAR/PLS OXIMETRY MLT: CPT

## 2025-06-09 PROCEDURE — 85610 PROTHROMBIN TIME: CPT

## 2025-06-09 PROCEDURE — 83735 ASSAY OF MAGNESIUM: CPT

## 2025-06-09 PROCEDURE — 6370000000 HC RX 637 (ALT 250 FOR IP)

## 2025-06-09 PROCEDURE — 2500000003 HC RX 250 WO HCPCS

## 2025-06-09 PROCEDURE — 97530 THERAPEUTIC ACTIVITIES: CPT

## 2025-06-09 PROCEDURE — 97110 THERAPEUTIC EXERCISES: CPT

## 2025-06-09 PROCEDURE — 97162 PT EVAL MOD COMPLEX 30 MIN: CPT

## 2025-06-09 PROCEDURE — 85027 COMPLETE CBC AUTOMATED: CPT

## 2025-06-09 PROCEDURE — 93005 ELECTROCARDIOGRAM TRACING: CPT

## 2025-06-09 PROCEDURE — 2140000000 HC CCU INTERMEDIATE R&B

## 2025-06-09 PROCEDURE — 6360000002 HC RX W HCPCS: Performed by: THORACIC SURGERY (CARDIOTHORACIC VASCULAR SURGERY)

## 2025-06-09 PROCEDURE — 97166 OT EVAL MOD COMPLEX 45 MIN: CPT

## 2025-06-09 RX ORDER — POTASSIUM CHLORIDE 1500 MG/1
40 TABLET, EXTENDED RELEASE ORAL ONCE
Status: COMPLETED | OUTPATIENT
Start: 2025-06-09 | End: 2025-06-09

## 2025-06-09 RX ORDER — FUROSEMIDE 10 MG/ML
40 INJECTION INTRAMUSCULAR; INTRAVENOUS ONCE
Status: COMPLETED | OUTPATIENT
Start: 2025-06-09 | End: 2025-06-09

## 2025-06-09 RX ORDER — AMIODARONE HYDROCHLORIDE 200 MG/1
400 TABLET ORAL 2 TIMES DAILY
Status: COMPLETED | OUTPATIENT
Start: 2025-06-09 | End: 2025-06-11

## 2025-06-09 RX ORDER — AMIODARONE HYDROCHLORIDE 200 MG/1
200 TABLET ORAL DAILY
Status: DISCONTINUED | OUTPATIENT
Start: 2025-06-12 | End: 2025-06-12 | Stop reason: HOSPADM

## 2025-06-09 RX ORDER — FUROSEMIDE 10 MG/ML
INJECTION INTRAMUSCULAR; INTRAVENOUS
Status: COMPLETED
Start: 2025-06-09 | End: 2025-06-09

## 2025-06-09 RX ADMIN — ALBUTEROL SULFATE 2.5 MG: 2.5 SOLUTION RESPIRATORY (INHALATION) at 08:39

## 2025-06-09 RX ADMIN — POTASSIUM CHLORIDE 40 MEQ: 1500 TABLET, EXTENDED RELEASE ORAL at 15:50

## 2025-06-09 RX ADMIN — AMIODARONE HYDROCHLORIDE 400 MG: 200 TABLET ORAL at 23:14

## 2025-06-09 RX ADMIN — FUROSEMIDE 40 MG: 10 INJECTION, SOLUTION INTRAMUSCULAR; INTRAVENOUS at 08:53

## 2025-06-09 RX ADMIN — CHLORHEXIDINE GLUCONATE 15 ML: 1.2 RINSE ORAL at 08:51

## 2025-06-09 RX ADMIN — AMIODARONE HYDROCHLORIDE 1 MG/MIN: 1.8 INJECTION, SOLUTION INTRAVENOUS at 13:18

## 2025-06-09 RX ADMIN — POTASSIUM CHLORIDE 20 MEQ: 29.8 INJECTION, SOLUTION INTRAVENOUS at 05:41

## 2025-06-09 RX ADMIN — IRON SUCROSE 200 MG: 20 INJECTION, SOLUTION INTRAVENOUS at 13:01

## 2025-06-09 RX ADMIN — AMIODARONE HYDROCHLORIDE 0.5 MG/MIN: 1.8 INJECTION, SOLUTION INTRAVENOUS at 18:32

## 2025-06-09 RX ADMIN — MUPIROCIN: 20 OINTMENT TOPICAL at 23:16

## 2025-06-09 RX ADMIN — WATER 2000 MG: 1 INJECTION INTRAMUSCULAR; INTRAVENOUS; SUBCUTANEOUS at 07:52

## 2025-06-09 RX ADMIN — METOPROLOL TARTRATE 25 MG: 25 TABLET, FILM COATED ORAL at 23:15

## 2025-06-09 RX ADMIN — ENOXAPARIN SODIUM 40 MG: 100 INJECTION SUBCUTANEOUS at 08:48

## 2025-06-09 RX ADMIN — DOCUSATE SODIUM 50MG AND SENNOSIDES 8.6MG 1 TABLET: 8.6; 5 TABLET, FILM COATED ORAL at 08:51

## 2025-06-09 RX ADMIN — ALBUTEROL SULFATE 2.5 MG: 2.5 SOLUTION RESPIRATORY (INHALATION) at 20:14

## 2025-06-09 RX ADMIN — ALBUTEROL SULFATE 2.5 MG: 2.5 SOLUTION RESPIRATORY (INHALATION) at 12:08

## 2025-06-09 RX ADMIN — ACETAMINOPHEN 650 MG: 325 TABLET ORAL at 18:30

## 2025-06-09 RX ADMIN — POTASSIUM CHLORIDE 40 MEQ: 1500 TABLET, EXTENDED RELEASE ORAL at 08:59

## 2025-06-09 RX ADMIN — Medication 10 ML: at 23:16

## 2025-06-09 RX ADMIN — CHLORHEXIDINE GLUCONATE 15 ML: 1.2 RINSE ORAL at 23:16

## 2025-06-09 RX ADMIN — MUPIROCIN: 20 OINTMENT TOPICAL at 08:51

## 2025-06-09 RX ADMIN — ACETAMINOPHEN 650 MG: 325 TABLET ORAL at 12:55

## 2025-06-09 RX ADMIN — ALBUTEROL SULFATE 2.5 MG: 2.5 SOLUTION RESPIRATORY (INHALATION) at 15:33

## 2025-06-09 RX ADMIN — METOPROLOL TARTRATE 25 MG: 25 TABLET, FILM COATED ORAL at 08:51

## 2025-06-09 RX ADMIN — PANTOPRAZOLE SODIUM 40 MG: 40 TABLET, DELAYED RELEASE ORAL at 08:51

## 2025-06-09 RX ADMIN — FUROSEMIDE 40 MG: 10 INJECTION INTRAMUSCULAR; INTRAVENOUS at 08:53

## 2025-06-09 RX ADMIN — POLYETHYLENE GLYCOL 3350 17 G: 17 POWDER, FOR SOLUTION ORAL at 08:49

## 2025-06-09 RX ADMIN — ASPIRIN 81 MG: 81 TABLET, COATED ORAL at 08:59

## 2025-06-09 RX ADMIN — DOCUSATE SODIUM 50MG AND SENNOSIDES 8.6MG 1 TABLET: 8.6; 5 TABLET, FILM COATED ORAL at 23:15

## 2025-06-09 RX ADMIN — FUROSEMIDE 40 MG: 10 INJECTION, SOLUTION INTRAMUSCULAR; INTRAVENOUS at 15:50

## 2025-06-09 RX ADMIN — AMIODARONE HYDROCHLORIDE 150 MG: 1.5 INJECTION, SOLUTION INTRAVENOUS at 13:01

## 2025-06-09 RX ADMIN — AMIODARONE HYDROCHLORIDE 400 MG: 200 TABLET ORAL at 15:50

## 2025-06-09 ASSESSMENT — PAIN DESCRIPTION - ORIENTATION: ORIENTATION: ANTERIOR

## 2025-06-09 ASSESSMENT — PAIN SCALES - GENERAL
PAINLEVEL_OUTOF10: 1
PAINLEVEL_OUTOF10: 1
PAINLEVEL_OUTOF10: 0
PAINLEVEL_OUTOF10: 1
PAINLEVEL_OUTOF10: 0

## 2025-06-09 ASSESSMENT — PAIN DESCRIPTION - LOCATION: LOCATION: CHEST

## 2025-06-09 ASSESSMENT — PAIN DESCRIPTION - DESCRIPTORS: DESCRIPTORS: THROBBING;GNAWING

## 2025-06-09 NOTE — PROCEDURES
Pulmonary Function Testing      Patient name:  Iris Becerra      Unit #:   [id number]   Date of test:  6/4/2025   Date of interpretation:   6/9/2025    Ms. Iris Becerra is a 64 y.o. year-old non smoker. The spirometry data were acceptable and reproducible.     Spirometry:  Flow volume loops were  unable to be assessed . The FEV-1/FVC ratio was normal. The FEV-1 was moderate. The FVC was decreased. Response to inhaled bronchodilators (albuterol) was not performed.    Lung volumes:  Lung volumes were not tested by plethysmography. The total lung capacity was not tested. The residual volume was not tested. The ratio of residual volume to total lung capacity (RV/TLC) was not tested.     Diffusion capacity was Not performed.      Interpretation:  Nonspecific ventilatory defect.  Lung volumes, diffusion, and bronchodilator response were not assessed.  Recommend full pulmonary function test.    Comments: None      Prem Espinal MD, [facility]  [facility]

## 2025-06-09 NOTE — CARE COORDINATION
Chart reviewed. LOS day #  5. Admitted as inpatient. Followed by CTS and Pulm.  S/p CABG. Extubated yesterday. Art lines and CT's removed. Balloon pump removed yesterday. Home with [facility] recs and 24 hour assist. Will follow.

## 2025-06-10 ENCOUNTER — APPOINTMENT (OUTPATIENT)
Dept: GENERAL RADIOLOGY | Age: 65
DRG: 233 | End: 2025-06-10
Payer: MEDICARE

## 2025-06-10 LAB
ANION GAP SERPL CALCULATED.3IONS-SCNC: 11 MMOL/L (ref 3–16)
BUN SERPL-MCNC: 25 MG/DL (ref 7–20)
CALCIUM SERPL-MCNC: 8.2 MG/DL (ref 8.3–10.6)
CHLORIDE SERPL-SCNC: 104 MMOL/L (ref 99–110)
CO2 SERPL-SCNC: 25 MMOL/L (ref 21–32)
CREAT SERPL-MCNC: 1 MG/DL (ref 0.6–1.2)
DEPRECATED RDW RBC AUTO: 22.3 % (ref 12.4–15.4)
EKG DIAGNOSIS: NORMAL
EKG Q-T INTERVAL: 284 MS
EKG QRS DURATION: 84 MS
EKG QTC CALCULATION (BAZETT): 379 MS
EKG R AXIS: -7 DEGREES
EKG T AXIS: 5 DEGREES
EKG VENTRICULAR RATE: 107 BPM
GFR SERPLBLD CREATININE-BSD FMLA CKD-EPI: 62 ML/MIN/{1.73_M2}
GLUCOSE BLD-MCNC: 107 MG/DL (ref 70–99)
GLUCOSE BLD-MCNC: 117 MG/DL (ref 70–99)
GLUCOSE BLD-MCNC: 125 MG/DL (ref 70–99)
GLUCOSE BLD-MCNC: 99 MG/DL (ref 70–99)
GLUCOSE SERPL-MCNC: 100 MG/DL (ref 70–99)
HCT VFR BLD AUTO: 21.9 % (ref 36–48)
HGB BLD-MCNC: 7.2 G/DL (ref 12–16)
INR PPP: 1.15 (ref 0.85–1.15)
MAGNESIUM SERPL-MCNC: 1.84 MG/DL (ref 1.8–2.4)
MCH RBC QN AUTO: 32.2 PG (ref 26–34)
MCHC RBC AUTO-ENTMCNC: 32.9 G/DL (ref 31–36)
MCV RBC AUTO: 97.8 FL (ref 80–100)
PERFORMED ON: ABNORMAL
PERFORMED ON: NORMAL
PLATELET # BLD AUTO: 98 K/UL (ref 135–450)
PMV BLD AUTO: 8.5 FL (ref 5–10.5)
POTASSIUM SERPL-SCNC: 4.9 MMOL/L (ref 3.5–5.1)
PROTHROMBIN TIME: 14.9 SEC (ref 11.9–14.9)
RBC # BLD AUTO: 2.24 M/UL (ref 4–5.2)
SODIUM SERPL-SCNC: 140 MMOL/L (ref 136–145)
WBC # BLD AUTO: 7.7 K/UL (ref 4–11)

## 2025-06-10 PROCEDURE — 94640 AIRWAY INHALATION TREATMENT: CPT

## 2025-06-10 PROCEDURE — 6370000000 HC RX 637 (ALT 250 FOR IP)

## 2025-06-10 PROCEDURE — 85027 COMPLETE CBC AUTOMATED: CPT

## 2025-06-10 PROCEDURE — 71045 X-RAY EXAM CHEST 1 VIEW: CPT

## 2025-06-10 PROCEDURE — 6360000002 HC RX W HCPCS

## 2025-06-10 PROCEDURE — 2580000003 HC RX 258: Performed by: THORACIC SURGERY (CARDIOTHORACIC VASCULAR SURGERY)

## 2025-06-10 PROCEDURE — 93010 ELECTROCARDIOGRAM REPORT: CPT | Performed by: INTERNAL MEDICINE

## 2025-06-10 PROCEDURE — 94761 N-INVAS EAR/PLS OXIMETRY MLT: CPT

## 2025-06-10 PROCEDURE — 6360000002 HC RX W HCPCS: Performed by: THORACIC SURGERY (CARDIOTHORACIC VASCULAR SURGERY)

## 2025-06-10 PROCEDURE — 6370000000 HC RX 637 (ALT 250 FOR IP): Performed by: THORACIC SURGERY (CARDIOTHORACIC VASCULAR SURGERY)

## 2025-06-10 PROCEDURE — 99024 POSTOP FOLLOW-UP VISIT: CPT

## 2025-06-10 PROCEDURE — 80048 BASIC METABOLIC PNL TOTAL CA: CPT

## 2025-06-10 PROCEDURE — 2500000003 HC RX 250 WO HCPCS

## 2025-06-10 PROCEDURE — 85610 PROTHROMBIN TIME: CPT

## 2025-06-10 PROCEDURE — 97116 GAIT TRAINING THERAPY: CPT

## 2025-06-10 PROCEDURE — 83735 ASSAY OF MAGNESIUM: CPT

## 2025-06-10 PROCEDURE — 2700000000 HC OXYGEN THERAPY PER DAY

## 2025-06-10 PROCEDURE — 97530 THERAPEUTIC ACTIVITIES: CPT

## 2025-06-10 PROCEDURE — 2140000000 HC CCU INTERMEDIATE R&B

## 2025-06-10 PROCEDURE — 94669 MECHANICAL CHEST WALL OSCILL: CPT

## 2025-06-10 RX ORDER — LANOLIN ALCOHOL/MO/W.PET/CERES
400 CREAM (GRAM) TOPICAL DAILY
Status: DISCONTINUED | OUTPATIENT
Start: 2025-06-10 | End: 2025-06-12 | Stop reason: HOSPADM

## 2025-06-10 RX ORDER — POTASSIUM CHLORIDE 1500 MG/1
40 TABLET, EXTENDED RELEASE ORAL 2 TIMES DAILY
Status: DISCONTINUED | OUTPATIENT
Start: 2025-06-10 | End: 2025-06-12 | Stop reason: HOSPADM

## 2025-06-10 RX ORDER — FUROSEMIDE 10 MG/ML
20 INJECTION INTRAMUSCULAR; INTRAVENOUS 2 TIMES DAILY
Status: DISCONTINUED | OUTPATIENT
Start: 2025-06-10 | End: 2025-06-11

## 2025-06-10 RX ADMIN — PANTOPRAZOLE SODIUM 40 MG: 40 TABLET, DELAYED RELEASE ORAL at 09:08

## 2025-06-10 RX ADMIN — METOPROLOL TARTRATE 25 MG: 25 TABLET, FILM COATED ORAL at 20:39

## 2025-06-10 RX ADMIN — POTASSIUM CHLORIDE 40 MEQ: 1500 TABLET, EXTENDED RELEASE ORAL at 12:03

## 2025-06-10 RX ADMIN — AMIODARONE HYDROCHLORIDE 400 MG: 200 TABLET ORAL at 20:38

## 2025-06-10 RX ADMIN — ACETAMINOPHEN 650 MG: 325 TABLET ORAL at 16:32

## 2025-06-10 RX ADMIN — ALBUTEROL SULFATE 2.5 MG: 2.5 SOLUTION RESPIRATORY (INHALATION) at 12:12

## 2025-06-10 RX ADMIN — DOCUSATE SODIUM 50MG AND SENNOSIDES 8.6MG 1 TABLET: 8.6; 5 TABLET, FILM COATED ORAL at 20:38

## 2025-06-10 RX ADMIN — METOPROLOL TARTRATE 25 MG: 25 TABLET, FILM COATED ORAL at 10:59

## 2025-06-10 RX ADMIN — CHLORHEXIDINE GLUCONATE 15 ML: 1.2 RINSE ORAL at 09:09

## 2025-06-10 RX ADMIN — FUROSEMIDE 20 MG: 10 INJECTION, SOLUTION INTRAMUSCULAR; INTRAVENOUS at 12:03

## 2025-06-10 RX ADMIN — Medication 400 MG: at 12:03

## 2025-06-10 RX ADMIN — ASPIRIN 81 MG: 81 TABLET, COATED ORAL at 09:08

## 2025-06-10 RX ADMIN — MUPIROCIN: 20 OINTMENT TOPICAL at 20:38

## 2025-06-10 RX ADMIN — ENOXAPARIN SODIUM 40 MG: 100 INJECTION SUBCUTANEOUS at 09:08

## 2025-06-10 RX ADMIN — IRON SUCROSE 200 MG: 20 INJECTION, SOLUTION INTRAVENOUS at 12:37

## 2025-06-10 RX ADMIN — Medication 10 ML: at 20:39

## 2025-06-10 RX ADMIN — DOCUSATE SODIUM 50MG AND SENNOSIDES 8.6MG 1 TABLET: 8.6; 5 TABLET, FILM COATED ORAL at 09:08

## 2025-06-10 RX ADMIN — ALBUTEROL SULFATE 2.5 MG: 2.5 SOLUTION RESPIRATORY (INHALATION) at 09:23

## 2025-06-10 RX ADMIN — ALBUTEROL SULFATE 2.5 MG: 2.5 SOLUTION RESPIRATORY (INHALATION) at 19:41

## 2025-06-10 RX ADMIN — ALBUTEROL SULFATE 2.5 MG: 2.5 SOLUTION RESPIRATORY (INHALATION) at 15:40

## 2025-06-10 RX ADMIN — POLYETHYLENE GLYCOL 3350 17 G: 17 POWDER, FOR SOLUTION ORAL at 09:08

## 2025-06-10 RX ADMIN — Medication 10 ML: at 09:08

## 2025-06-10 RX ADMIN — FUROSEMIDE 20 MG: 10 INJECTION, SOLUTION INTRAMUSCULAR; INTRAVENOUS at 17:58

## 2025-06-10 RX ADMIN — POTASSIUM CHLORIDE 40 MEQ: 1500 TABLET, EXTENDED RELEASE ORAL at 20:38

## 2025-06-10 RX ADMIN — AMIODARONE HYDROCHLORIDE 400 MG: 200 TABLET ORAL at 09:08

## 2025-06-10 RX ADMIN — MUPIROCIN: 20 OINTMENT TOPICAL at 09:09

## 2025-06-10 RX ADMIN — CHLORHEXIDINE GLUCONATE 15 ML: 1.2 RINSE ORAL at 20:40

## 2025-06-10 ASSESSMENT — PAIN SCALES - GENERAL: PAINLEVEL_OUTOF10: 0

## 2025-06-10 NOTE — PLAN OF CARE
Problem: Pain  Goal: Verbalizes/displays adequate comfort level or baseline comfort level  6/9/2025 2319 by Chiqui Zarate RN  Outcome: Progressing  6/9/2025 2318 by Chiqui Zarate RN  Outcome: Progressing  Flowsheets (Taken 6/9/2025 2030)  Verbalizes/displays adequate comfort level or baseline comfort level:   Encourage patient to monitor pain and request assistance   Administer analgesics based on type and severity of pain and evaluate response   Consider cultural and social influences on pain and pain management  6/9/2025 1820 by Sheila Vasquez RN  Outcome: Progressing  Flowsheets (Taken 6/9/2025 1820)  Verbalizes/displays adequate comfort level or baseline comfort level:   Encourage patient to monitor pain and request assistance   Assess pain using appropriate pain scale   Administer analgesics based on type and severity of pain and evaluate response     Problem: Discharge Planning  Goal: Discharge to home or other facility with appropriate resources  6/9/2025 2319 by Chiqui Zarate RN  Outcome: Progressing  6/9/2025 2318 by Chiqui Zarate RN  Outcome: Progressing  6/9/2025 1820 by Sheila Vasquez RN  Outcome: Progressing  Flowsheets (Taken 6/9/2025 1820)  Discharge to home or other facility with appropriate resources: Identify barriers to discharge with patient and caregiver     Problem: Safety - Adult  Goal: Free from fall injury  6/9/2025 2319 by Chiqui Zarate RN  Outcome: Progressing  6/9/2025 2318 by Chiqui Zarate RN  Outcome: Progressing  6/9/2025 1820 by Sheila Vasquez RN  Outcome: Progressing  Flowsheets (Taken 6/9/2025 1820)  Free From Fall Injury:   Instruct family/caregiver on patient safety   Based on caregiver fall risk screen, instruct family/caregiver to ask for assistance with transferring infant if caregiver noted to have fall risk factors     Problem: Skin/Tissue Integrity  Goal: Skin integrity remains

## 2025-06-10 NOTE — CARE COORDINATION
Met with the PT and family at the bedside to discuss bathroom DME needs at d/c. Pt needs TTB and RTS. Advised that these items are not covered by insurance and advised where they can go to purchase them I.e. [employer], or [employer]. Pt and family verbalized understanding. Recs are for home with HHC. Pt needs 1-2 more days inpatient per CTS. Ref. To [facility] and they are OON with insurance. Re. To [facility] as they are in network with insurance.

## 2025-06-10 NOTE — PLAN OF CARE
Problem: Pain  Goal: Verbalizes/displays adequate comfort level or baseline comfort level  6/9/2025 2318 by Chiqui Zarate RN  Outcome: Progressing  Flowsheets (Taken 6/9/2025 2030)  Verbalizes/displays adequate comfort level or baseline comfort level:   Encourage patient to monitor pain and request assistance   Administer analgesics based on type and severity of pain and evaluate response   Consider cultural and social influences on pain and pain management  6/9/2025 1820 by Sheila Vasquez RN  Outcome: Progressing  Flowsheets (Taken 6/9/2025 1820)  Verbalizes/displays adequate comfort level or baseline comfort level:   Encourage patient to monitor pain and request assistance   Assess pain using appropriate pain scale   Administer analgesics based on type and severity of pain and evaluate response     Problem: Discharge Planning  Goal: Discharge to home or other facility with appropriate resources  6/9/2025 2318 by Chiqui Zarate RN  Outcome: Progressing  6/9/2025 1820 by Sheila Vasquez RN  Outcome: Progressing  Flowsheets (Taken 6/9/2025 1820)  Discharge to home or other facility with appropriate resources: Identify barriers to discharge with patient and caregiver     Problem: Safety - Adult  Goal: Free from fall injury  6/9/2025 2318 by Chiqui Zarate RN  Outcome: Progressing  6/9/2025 1820 by Sheila Vasquez RN  Outcome: Progressing  Flowsheets (Taken 6/9/2025 1820)  Free From Fall Injury:   Instruct family/caregiver on patient safety   Based on caregiver fall risk screen, instruct family/caregiver to ask for assistance with transferring infant if caregiver noted to have fall risk factors     Problem: Skin/Tissue Integrity  Goal: Skin integrity remains intact  Description: 1.  Monitor for areas of redness and/or skin breakdown2.  Assess vascular access sites hourly3.  Every 4-6 hours minimum:  Change oxygen saturation probe site4.  Every 4-6 hours:  If on nasal continuous

## 2025-06-11 ENCOUNTER — APPOINTMENT (OUTPATIENT)
Dept: GENERAL RADIOLOGY | Age: 65
DRG: 233 | End: 2025-06-11
Payer: MEDICARE

## 2025-06-11 LAB
ANION GAP SERPL CALCULATED.3IONS-SCNC: 11 MMOL/L (ref 3–16)
BUN SERPL-MCNC: 29 MG/DL (ref 7–20)
CALCIUM SERPL-MCNC: 8.8 MG/DL (ref 8.3–10.6)
CHLORIDE SERPL-SCNC: 105 MMOL/L (ref 99–110)
CO2 SERPL-SCNC: 22 MMOL/L (ref 21–32)
CREAT SERPL-MCNC: 1.1 MG/DL (ref 0.6–1.2)
DEPRECATED RDW RBC AUTO: 22.5 % (ref 12.4–15.4)
GFR SERPLBLD CREATININE-BSD FMLA CKD-EPI: 55 ML/MIN/{1.73_M2}
GLUCOSE BLD-MCNC: 108 MG/DL (ref 70–99)
GLUCOSE BLD-MCNC: 119 MG/DL (ref 70–99)
GLUCOSE BLD-MCNC: 124 MG/DL (ref 70–99)
GLUCOSE BLD-MCNC: 81 MG/DL (ref 70–99)
GLUCOSE SERPL-MCNC: 102 MG/DL (ref 70–99)
HCT VFR BLD AUTO: 19.7 % (ref 36–48)
HGB BLD-MCNC: 6.5 G/DL (ref 12–16)
INR PPP: 1.08 (ref 0.86–1.14)
MAGNESIUM SERPL-MCNC: 1.96 MG/DL (ref 1.8–2.4)
MCH RBC QN AUTO: 32.2 PG (ref 26–34)
MCHC RBC AUTO-ENTMCNC: 32.8 G/DL (ref 31–36)
MCV RBC AUTO: 98.2 FL (ref 80–100)
PERFORMED ON: ABNORMAL
PERFORMED ON: NORMAL
PLATELET # BLD AUTO: 155 K/UL (ref 135–450)
PMV BLD AUTO: 8.7 FL (ref 5–10.5)
POTASSIUM SERPL-SCNC: 5.1 MMOL/L (ref 3.5–5.1)
PROTHROMBIN TIME: 14.3 SEC (ref 12.1–14.9)
RBC # BLD AUTO: 2.01 M/UL (ref 4–5.2)
SODIUM SERPL-SCNC: 138 MMOL/L (ref 136–145)
WBC # BLD AUTO: 12.1 K/UL (ref 4–11)

## 2025-06-11 PROCEDURE — 6370000000 HC RX 637 (ALT 250 FOR IP): Performed by: THORACIC SURGERY (CARDIOTHORACIC VASCULAR SURGERY)

## 2025-06-11 PROCEDURE — 97116 GAIT TRAINING THERAPY: CPT

## 2025-06-11 PROCEDURE — 2500000003 HC RX 250 WO HCPCS

## 2025-06-11 PROCEDURE — 99024 POSTOP FOLLOW-UP VISIT: CPT

## 2025-06-11 PROCEDURE — 94669 MECHANICAL CHEST WALL OSCILL: CPT

## 2025-06-11 PROCEDURE — 85027 COMPLETE CBC AUTOMATED: CPT

## 2025-06-11 PROCEDURE — 2140000000 HC CCU INTERMEDIATE R&B

## 2025-06-11 PROCEDURE — 6360000002 HC RX W HCPCS

## 2025-06-11 PROCEDURE — 2580000003 HC RX 258: Performed by: THORACIC SURGERY (CARDIOTHORACIC VASCULAR SURGERY)

## 2025-06-11 PROCEDURE — 97535 SELF CARE MNGMENT TRAINING: CPT

## 2025-06-11 PROCEDURE — 80048 BASIC METABOLIC PNL TOTAL CA: CPT

## 2025-06-11 PROCEDURE — 6370000000 HC RX 637 (ALT 250 FOR IP)

## 2025-06-11 PROCEDURE — 97110 THERAPEUTIC EXERCISES: CPT

## 2025-06-11 PROCEDURE — 97530 THERAPEUTIC ACTIVITIES: CPT

## 2025-06-11 PROCEDURE — 83735 ASSAY OF MAGNESIUM: CPT

## 2025-06-11 PROCEDURE — 71045 X-RAY EXAM CHEST 1 VIEW: CPT

## 2025-06-11 PROCEDURE — 6360000002 HC RX W HCPCS: Performed by: THORACIC SURGERY (CARDIOTHORACIC VASCULAR SURGERY)

## 2025-06-11 PROCEDURE — 85610 PROTHROMBIN TIME: CPT

## 2025-06-11 PROCEDURE — 94640 AIRWAY INHALATION TREATMENT: CPT

## 2025-06-11 PROCEDURE — P9045 ALBUMIN (HUMAN), 5%, 250 ML: HCPCS

## 2025-06-11 RX ORDER — FUROSEMIDE 10 MG/ML
40 INJECTION INTRAMUSCULAR; INTRAVENOUS 2 TIMES DAILY
Status: DISCONTINUED | OUTPATIENT
Start: 2025-06-11 | End: 2025-06-12

## 2025-06-11 RX ORDER — ATORVASTATIN CALCIUM 10 MG/1
10 TABLET, FILM COATED ORAL NIGHTLY
Status: DISCONTINUED | OUTPATIENT
Start: 2025-06-11 | End: 2025-06-11

## 2025-06-11 RX ORDER — ALBUMIN HUMAN 50 G/1000ML
25 SOLUTION INTRAVENOUS ONCE
Status: COMPLETED | OUTPATIENT
Start: 2025-06-11 | End: 2025-06-11

## 2025-06-11 RX ORDER — SIMVASTATIN 10 MG
10 TABLET ORAL NIGHTLY
Status: DISCONTINUED | OUTPATIENT
Start: 2025-06-11 | End: 2025-06-12 | Stop reason: HOSPADM

## 2025-06-11 RX ORDER — OLANZAPINE 5 MG/1
2.5 TABLET, FILM COATED ORAL NIGHTLY
Status: DISCONTINUED | OUTPATIENT
Start: 2025-06-11 | End: 2025-06-11

## 2025-06-11 RX ADMIN — AMIODARONE HYDROCHLORIDE 400 MG: 200 TABLET ORAL at 09:07

## 2025-06-11 RX ADMIN — IRON SUCROSE 200 MG: 20 INJECTION, SOLUTION INTRAVENOUS at 11:58

## 2025-06-11 RX ADMIN — METOPROLOL TARTRATE 25 MG: 25 TABLET, FILM COATED ORAL at 20:04

## 2025-06-11 RX ADMIN — ACETAMINOPHEN 650 MG: 325 TABLET ORAL at 11:57

## 2025-06-11 RX ADMIN — PANTOPRAZOLE SODIUM 40 MG: 40 TABLET, DELAYED RELEASE ORAL at 09:07

## 2025-06-11 RX ADMIN — MUPIROCIN: 20 OINTMENT TOPICAL at 09:33

## 2025-06-11 RX ADMIN — FUROSEMIDE 40 MG: 10 INJECTION, SOLUTION INTRAMUSCULAR; INTRAVENOUS at 09:32

## 2025-06-11 RX ADMIN — AMIODARONE HYDROCHLORIDE 400 MG: 200 TABLET ORAL at 20:03

## 2025-06-11 RX ADMIN — CHLORHEXIDINE GLUCONATE 15 ML: 1.2 RINSE ORAL at 09:33

## 2025-06-11 RX ADMIN — ACETAMINOPHEN 650 MG: 325 TABLET ORAL at 00:06

## 2025-06-11 RX ADMIN — FUROSEMIDE 40 MG: 10 INJECTION, SOLUTION INTRAMUSCULAR; INTRAVENOUS at 17:26

## 2025-06-11 RX ADMIN — ALBUTEROL SULFATE 2.5 MG: 2.5 SOLUTION RESPIRATORY (INHALATION) at 15:43

## 2025-06-11 RX ADMIN — ALBUTEROL SULFATE 2.5 MG: 2.5 SOLUTION RESPIRATORY (INHALATION) at 19:53

## 2025-06-11 RX ADMIN — Medication 10 ML: at 09:35

## 2025-06-11 RX ADMIN — CHLORHEXIDINE GLUCONATE 15 ML: 1.2 RINSE ORAL at 20:04

## 2025-06-11 RX ADMIN — Medication 10 ML: at 20:14

## 2025-06-11 RX ADMIN — ACETAMINOPHEN 650 MG: 325 TABLET ORAL at 23:55

## 2025-06-11 RX ADMIN — ASPIRIN 81 MG: 81 TABLET, COATED ORAL at 09:06

## 2025-06-11 RX ADMIN — MUPIROCIN: 20 OINTMENT TOPICAL at 20:04

## 2025-06-11 RX ADMIN — ALBUTEROL SULFATE 2.5 MG: 2.5 SOLUTION RESPIRATORY (INHALATION) at 07:49

## 2025-06-11 RX ADMIN — POTASSIUM CHLORIDE 40 MEQ: 1500 TABLET, EXTENDED RELEASE ORAL at 20:04

## 2025-06-11 RX ADMIN — ENOXAPARIN SODIUM 40 MG: 100 INJECTION SUBCUTANEOUS at 09:06

## 2025-06-11 RX ADMIN — METOPROLOL TARTRATE 25 MG: 25 TABLET, FILM COATED ORAL at 09:07

## 2025-06-11 RX ADMIN — ALBUMIN (HUMAN) 25 G: 12.5 INJECTION, SOLUTION INTRAVENOUS at 15:09

## 2025-06-11 RX ADMIN — ALBUTEROL SULFATE 2.5 MG: 2.5 SOLUTION RESPIRATORY (INHALATION) at 11:30

## 2025-06-11 RX ADMIN — Medication 400 MG: at 09:07

## 2025-06-11 RX ADMIN — ACETAMINOPHEN 650 MG: 325 TABLET ORAL at 17:26

## 2025-06-11 RX ADMIN — POTASSIUM CHLORIDE 40 MEQ: 1500 TABLET, EXTENDED RELEASE ORAL at 09:07

## 2025-06-11 ASSESSMENT — PAIN SCALES - GENERAL
PAINLEVEL_OUTOF10: 0
PAINLEVEL_OUTOF10: 0

## 2025-06-11 NOTE — PLAN OF CARE
Problem: Pain  Goal: Verbalizes/displays adequate comfort level or baseline comfort level  6/11/2025 1755 by Tina Espinoza RN  Outcome: Progressing  6/11/2025 1125 by Bandar Ferro, RN  Outcome: Progressing     Problem: Discharge Planning  Goal: Discharge to home or other facility with appropriate resources  6/11/2025 1755 by Tina Espinoza RN  Outcome: Progressing  6/11/2025 1125 by Bandar Ferro, RN  Outcome: Progressing     Problem: Safety - Adult  Goal: Free from fall injury  6/11/2025 1755 by Tina Espinoza RN  Outcome: Progressing  6/11/2025 1125 by Bandar Ferro, RN  Outcome: Progressing

## 2025-06-11 NOTE — PLAN OF CARE
Problem: Pain  Goal: Verbalizes/displays adequate comfort level or baseline comfort level  6/11/2025 0202 by Elias Guaman RN  Outcome: Progressing  6/11/2025 0202 by Elias Guaman RN  Outcome: Progressing  Flowsheets (Taken 6/10/2025 2000)  Verbalizes/displays adequate comfort level or baseline comfort level: Encourage patient to monitor pain and request assistance     Problem: Discharge Planning  Goal: Discharge to home or other facility with appropriate resources  6/11/2025 0202 by Elias Guaman RN  Outcome: Progressing  6/11/2025 0202 by Elias Guaman RN  Outcome: Progressing  Flowsheets (Taken 6/10/2025 2000)  Discharge to home or other facility with appropriate resources: Identify barriers to discharge with patient and caregiver     Problem: Safety - Adult  Goal: Free from fall injury  6/11/2025 0202 by Elias Guaman RN  Outcome: Progressing  6/11/2025 0202 by Elias Guaman RN  Outcome: Progressing     Problem: Skin/Tissue Integrity  Goal: Skin integrity remains intact  Description: 1.  Monitor for areas of redness and/or skin breakdown2.  Assess vascular access sites hourly3.  Every 4-6 hours minimum:  Change oxygen saturation probe site4.  Every 4-6 hours:  If on nasal continuous positive airway pressure, respiratory therapy assess nares and determine need for appliance change or resting period  6/11/2025 0202 by Elias Guaman RN  Outcome: Progressing  6/11/2025 0202 by Elias Guaman RN  Outcome: Progressing  Flowsheets (Taken 6/10/2025 2000)  Skin Integrity Remains Intact: Monitor for areas of redness and/or skin breakdown     Problem: ABCDS Injury Assessment  Goal: Absence of physical injury  6/11/2025 0202 by Elias Guaman RN  Outcome: Progressing  6/11/2025 0202 by Elias Guaman RN  Outcome: Progressing     Problem: Cardiovascular - Adult  Goal: Maintains optimal cardiac output and hemodynamic stability  6/11/2025 0202 by Elias Guaman RN  Outcome: Progressing  6/11/2025 0202 by Elias Guaman

## 2025-06-11 NOTE — PLAN OF CARE
Problem: Pain  Goal: Verbalizes/displays adequate comfort level or baseline comfort level  6/11/2025 1125 by Bandar Ferro RN  Outcome: Progressing  6/11/2025 0202 by Elias Guaman RN  Outcome: Progressing  6/11/2025 0202 by Elias Guaman RN  Outcome: Progressing  Flowsheets  Taken 6/11/2025 0000  Verbalizes/displays adequate comfort level or baseline comfort level: Encourage patient to monitor pain and request assistance  Taken 6/10/2025 2000  Verbalizes/displays adequate comfort level or baseline comfort level: Encourage patient to monitor pain and request assistance     Problem: Discharge Planning  Goal: Discharge to home or other facility with appropriate resources  6/11/2025 1125 by Bandar Ferro RN  Outcome: Progressing  6/11/2025 0202 by Elias Guaman RN  Outcome: Progressing  6/11/2025 0202 by Elias Guaman RN  Outcome: Progressing  Flowsheets  Taken 6/11/2025 0000  Discharge to home or other facility with appropriate resources: Identify barriers to discharge with patient and caregiver  Taken 6/10/2025 2000  Discharge to home or other facility with appropriate resources: Identify barriers to discharge with patient and caregiver     Problem: Safety - Adult  Goal: Free from fall injury  6/11/2025 1125 by Bandar Ferro RN  Outcome: Progressing  6/11/2025 0202 by Elias Guaman RN  Outcome: Progressing  6/11/2025 0202 by Elias Guaman RN  Outcome: Progressing     Problem: Skin/Tissue Integrity  Goal: Skin integrity remains intact  Description: 1.  Monitor for areas of redness and/or skin breakdown2.  Assess vascular access sites hourly3.  Every 4-6 hours minimum:  Change oxygen saturation probe site4.  Every 4-6 hours:  If on nasal continuous positive airway pressure, respiratory therapy assess nares and determine need for appliance change or resting period  6/11/2025 1125 by Bandar Ferro RN  Outcome: Progressing  6/11/2025 0202 by Elias Guaman RN  Outcome: Progressing  6/11/2025 0202 by Rowena

## 2025-06-11 NOTE — PLAN OF CARE
Problem: Pain  Goal: Verbalizes/displays adequate comfort level or baseline comfort level  Outcome: Progressing  Flowsheets (Taken 6/10/2025 2000)  Verbalizes/displays adequate comfort level or baseline comfort level: Encourage patient to monitor pain and request assistance     Problem: Discharge Planning  Goal: Discharge to home or other facility with appropriate resources  Outcome: Progressing  Flowsheets (Taken 6/10/2025 2000)  Discharge to home or other facility with appropriate resources: Identify barriers to discharge with patient and caregiver     Problem: Safety - Adult  Goal: Free from fall injury  Outcome: Progressing     Problem: Skin/Tissue Integrity  Goal: Skin integrity remains intact  Description: 1.  Monitor for areas of redness and/or skin breakdown2.  Assess vascular access sites hourly3.  Every 4-6 hours minimum:  Change oxygen saturation probe site4.  Every 4-6 hours:  If on nasal continuous positive airway pressure, respiratory therapy assess nares and determine need for appliance change or resting period  Outcome: Progressing  Flowsheets (Taken 6/10/2025 2000)  Skin Integrity Remains Intact: Monitor for areas of redness and/or skin breakdown     Problem: ABCDS Injury Assessment  Goal: Absence of physical injury  Outcome: Progressing     Problem: Cardiovascular - Adult  Goal: Maintains optimal cardiac output and hemodynamic stability  Outcome: Progressing  Flowsheets (Taken 6/10/2025 2000)  Maintains optimal cardiac output and hemodynamic stability: Monitor blood pressure and heart rate  Goal: Absence of cardiac dysrhythmias or at baseline  Outcome: Progressing  Flowsheets (Taken 6/10/2025 2000)  Absence of cardiac dysrhythmias or at baseline: Assess for signs of decreased cardiac output     Problem: Genitourinary - Adult  Goal: Urinary catheter remains patent  Outcome: Progressing  Flowsheets (Taken 6/10/2025 2000)  Urinary catheter remains patent: Assess patency of urinary catheter

## 2025-06-12 ENCOUNTER — APPOINTMENT (OUTPATIENT)
Dept: GENERAL RADIOLOGY | Age: 65
DRG: 233 | End: 2025-06-12
Payer: MEDICARE

## 2025-06-12 VITALS
RESPIRATION RATE: 18 BRPM | BODY MASS INDEX: 36.4 KG/M2 | WEIGHT: 218.48 LBS | HEIGHT: 65 IN | SYSTOLIC BLOOD PRESSURE: 117 MMHG | HEART RATE: 53 BPM | OXYGEN SATURATION: 96 % | TEMPERATURE: 98 F | DIASTOLIC BLOOD PRESSURE: 68 MMHG

## 2025-06-12 LAB
ANION GAP SERPL CALCULATED.3IONS-SCNC: 10 MMOL/L (ref 3–16)
BUN SERPL-MCNC: 28 MG/DL (ref 7–20)
CALCIUM SERPL-MCNC: 8.7 MG/DL (ref 8.3–10.6)
CHLORIDE SERPL-SCNC: 105 MMOL/L (ref 99–110)
CO2 SERPL-SCNC: 24 MMOL/L (ref 21–32)
CREAT SERPL-MCNC: 1.1 MG/DL (ref 0.6–1.2)
DEPRECATED RDW RBC AUTO: 22.6 % (ref 12.4–15.4)
GFR SERPLBLD CREATININE-BSD FMLA CKD-EPI: 55 ML/MIN/{1.73_M2}
GLUCOSE SERPL-MCNC: 96 MG/DL (ref 70–99)
HCT VFR BLD AUTO: 22.2 % (ref 36–48)
HGB BLD-MCNC: 7.3 G/DL (ref 12–16)
INR PPP: 1.19 (ref 0.86–1.14)
MAGNESIUM SERPL-MCNC: 1.87 MG/DL (ref 1.8–2.4)
MCH RBC QN AUTO: 32.6 PG (ref 26–34)
MCHC RBC AUTO-ENTMCNC: 32.7 G/DL (ref 31–36)
MCV RBC AUTO: 99.8 FL (ref 80–100)
PLATELET # BLD AUTO: 144 K/UL (ref 135–450)
PMV BLD AUTO: 8.8 FL (ref 5–10.5)
POTASSIUM SERPL-SCNC: 5.3 MMOL/L (ref 3.5–5.1)
PROTHROMBIN TIME: 15.4 SEC (ref 12.1–14.9)
RBC # BLD AUTO: 2.22 M/UL (ref 4–5.2)
SODIUM SERPL-SCNC: 139 MMOL/L (ref 136–145)
WBC # BLD AUTO: 9.3 K/UL (ref 4–11)

## 2025-06-12 PROCEDURE — 6370000000 HC RX 637 (ALT 250 FOR IP): Performed by: THORACIC SURGERY (CARDIOTHORACIC VASCULAR SURGERY)

## 2025-06-12 PROCEDURE — 97116 GAIT TRAINING THERAPY: CPT

## 2025-06-12 PROCEDURE — 85610 PROTHROMBIN TIME: CPT

## 2025-06-12 PROCEDURE — 83735 ASSAY OF MAGNESIUM: CPT

## 2025-06-12 PROCEDURE — 85027 COMPLETE CBC AUTOMATED: CPT

## 2025-06-12 PROCEDURE — 6360000002 HC RX W HCPCS

## 2025-06-12 PROCEDURE — 6370000000 HC RX 637 (ALT 250 FOR IP)

## 2025-06-12 PROCEDURE — 80048 BASIC METABOLIC PNL TOTAL CA: CPT

## 2025-06-12 PROCEDURE — 36415 COLL VENOUS BLD VENIPUNCTURE: CPT

## 2025-06-12 PROCEDURE — 71045 X-RAY EXAM CHEST 1 VIEW: CPT

## 2025-06-12 PROCEDURE — 2500000003 HC RX 250 WO HCPCS

## 2025-06-12 RX ORDER — OXYCODONE HYDROCHLORIDE 5 MG/1
5 TABLET ORAL EVERY 6 HOURS PRN
Qty: 28 TABLET | Refills: 0 | Status: SHIPPED | OUTPATIENT
Start: 2025-06-12 | End: 2025-06-19

## 2025-06-12 RX ORDER — ASPIRIN 81 MG/1
81 TABLET ORAL DAILY
Qty: 30 TABLET | Refills: 0 | Status: SHIPPED | OUTPATIENT
Start: 2025-06-13

## 2025-06-12 RX ORDER — AMIODARONE HYDROCHLORIDE 200 MG/1
200 TABLET ORAL DAILY
Qty: 30 TABLET | Refills: 0 | Status: SHIPPED | OUTPATIENT
Start: 2025-06-13 | End: 2025-07-13

## 2025-06-12 RX ORDER — FUROSEMIDE 10 MG/ML
20 INJECTION INTRAMUSCULAR; INTRAVENOUS 2 TIMES DAILY
Status: DISCONTINUED | OUTPATIENT
Start: 2025-06-12 | End: 2025-06-12

## 2025-06-12 RX ORDER — SENNA AND DOCUSATE SODIUM 50; 8.6 MG/1; MG/1
1 TABLET, FILM COATED ORAL DAILY PRN
Qty: 30 TABLET | Refills: 0 | Status: SHIPPED | OUTPATIENT
Start: 2025-06-12

## 2025-06-12 RX ORDER — POTASSIUM CHLORIDE 1500 MG/1
20 TABLET, EXTENDED RELEASE ORAL DAILY
Qty: 5 TABLET | Refills: 0 | Status: SHIPPED | OUTPATIENT
Start: 2025-06-12 | End: 2025-06-17

## 2025-06-12 RX ORDER — FUROSEMIDE 10 MG/ML
40 INJECTION INTRAMUSCULAR; INTRAVENOUS DAILY
Status: DISCONTINUED | OUTPATIENT
Start: 2025-06-13 | End: 2025-06-12 | Stop reason: HOSPADM

## 2025-06-12 RX ORDER — FERROUS SULFATE 325(65) MG
325 TABLET ORAL 2 TIMES DAILY
Qty: 60 TABLET | Refills: 0 | Status: SHIPPED | OUTPATIENT
Start: 2025-06-12

## 2025-06-12 RX ORDER — METOPROLOL TARTRATE 25 MG/1
25 TABLET, FILM COATED ORAL 2 TIMES DAILY
Qty: 60 TABLET | Refills: 0 | Status: SHIPPED | OUTPATIENT
Start: 2025-06-12

## 2025-06-12 RX ORDER — PANTOPRAZOLE SODIUM 40 MG/1
40 TABLET, DELAYED RELEASE ORAL DAILY
Qty: 30 TABLET | Refills: 0 | Status: SHIPPED | OUTPATIENT
Start: 2025-06-13 | End: 2025-07-13

## 2025-06-12 RX ORDER — TORSEMIDE 20 MG/1
20 TABLET ORAL DAILY
Qty: 5 TABLET | Refills: 0 | Status: SHIPPED | OUTPATIENT
Start: 2025-06-12 | End: 2025-06-17

## 2025-06-12 RX ADMIN — PANTOPRAZOLE SODIUM 40 MG: 40 TABLET, DELAYED RELEASE ORAL at 08:13

## 2025-06-12 RX ADMIN — Medication 10 ML: at 08:14

## 2025-06-12 RX ADMIN — AMIODARONE HYDROCHLORIDE 200 MG: 200 TABLET ORAL at 08:13

## 2025-06-12 RX ADMIN — Medication 400 MG: at 08:13

## 2025-06-12 RX ADMIN — ACETAMINOPHEN 650 MG: 325 TABLET ORAL at 06:40

## 2025-06-12 RX ADMIN — ENOXAPARIN SODIUM 40 MG: 100 INJECTION SUBCUTANEOUS at 08:13

## 2025-06-12 RX ADMIN — FUROSEMIDE 40 MG: 10 INJECTION, SOLUTION INTRAMUSCULAR; INTRAVENOUS at 08:13

## 2025-06-12 RX ADMIN — MUPIROCIN: 20 OINTMENT TOPICAL at 08:14

## 2025-06-12 RX ADMIN — METOPROLOL TARTRATE 25 MG: 25 TABLET, FILM COATED ORAL at 08:13

## 2025-06-12 RX ADMIN — ASPIRIN 81 MG: 81 TABLET, COATED ORAL at 08:13

## 2025-06-12 ASSESSMENT — PAIN SCALES - GENERAL: PAINLEVEL_OUTOF10: 0

## 2025-06-12 NOTE — DISCHARGE INSTR - COC
Continuity of Care Form    Patient Name: Iris Becerra   :  1960  MRN:  [id number]    Admit date:  2025  Discharge date:  2025    Code Status Order: Full Code   Advance Directives:     Admitting Physician:  Candido Mustafa MD  PCP: Samir Hope, APRN - CNP    Discharging Nurse: TARSHA Espinoza  Discharging Hospital Unit/Room#: 0239/0239-01  Discharging Unit Phone Number: [phone]    Emergency Contact:   Extended Emergency Contact Information  Primary Emergency Contact: Yousif Becerra  Address: [facility] [place] [address]  Home Phone: [phone]  Work Phone: [phone]  Mobile Phone: [phone]  Relation: Spouse  Secondary Emergency Contact: Diana Field  Home Phone: [phone]  Relation: Child    Past Surgical History:  Past Surgical History:   Procedure Laterality Date    ABDOMINAL EXPLORATION SURGERY  2018    trachelectomy, plvic and pasquale-aortic lymphadenectomy, omentectomy    CARDIAC PROCEDURE N/A 2025    Left heart cath / coronary angiography performed by Gomez Perry MD at [facility] CARDIAC CATH LAB    CARDIAC PROCEDURE N/A 2025    Intra-aortic balloon pump insertion performed by Gomez Perry MD at [facility] CARDIAC CATH LAB    COLONOSCOPY      CORONARY ARTERY BYPASS GRAFT N/A 2025    CORONARY ARTERY BYPASS GRAFT TIMES TWO, OFF PUMP, BILATERAL INTERNAL MAMMARY ARTERIES, LEFT SAPHENOUS VEIN GRAFT EXPLORATION, TRANSESOPHAGEAL ECHOCARDIOGRAM, POSTERIOR PERICARDIOTOMY,  BILATERAL PECTORALIS BLOCK performed by Candido Mustafa MD at [facility] CVOR    DILATION AND CURETTAGE OF UTERUS  2011    D & C, HYSTEROSCOPY W/ NOVASURE ABLATION    ENDOMETRIAL BIOPSY      2010    HYSTERECTOMY (CERVIX STATUS UNKNOWN)  2018    partial    INVASIVE VASCULAR N/A 2025    Angiography iliac bilat performed by Gomez Perry MD at [facility] CARDIAC CATH LAB    OVARY REMOVAL      TUNNELED VENOUS PORT PLACEMENT Right 2018    [employer] [facility]

## 2025-06-12 NOTE — PLAN OF CARE
Problem: Pain  Goal: Verbalizes/displays adequate comfort level or baseline comfort level  6/12/2025 0712 by Valdez Pryor RN  Outcome: Progressing  6/11/2025 1755 by Tina Espinoza RN  Outcome: Progressing     Problem: Discharge Planning  Goal: Discharge to home or other facility with appropriate resources  6/12/2025 0712 by Valdez Pryor RN  Outcome: Progressing  6/11/2025 1755 by Tina Espinoza RN  Outcome: Progressing     Problem: Safety - Adult  Goal: Free from fall injury  6/12/2025 0712 by Valdez Pryor RN  Outcome: Progressing  6/11/2025 1755 by Tina Espinoza RN  Outcome: Progressing     Problem: Skin/Tissue Integrity  Goal: Skin integrity remains intact  Description: 1.  Monitor for areas of redness and/or skin breakdown2.  Assess vascular access sites hourly3.  Every 4-6 hours minimum:  Change oxygen saturation probe site4.  Every 4-6 hours:  If on nasal continuous positive airway pressure, respiratory therapy assess nares and determine need for appliance change or resting period  6/12/2025 0712 by Valdez Pryor RN  Outcome: Progressing  6/11/2025 1755 by Tina Espinoza RN  Outcome: Progressing     Problem: ABCDS Injury Assessment  Goal: Absence of physical injury  6/12/2025 0712 by Valdez Pryor RN  Outcome: Progressing  6/11/2025 1755 by Tian Espinoza RN  Outcome: Progressing     Problem: Cardiovascular - Adult  Goal: Maintains optimal cardiac output and hemodynamic stability  6/12/2025 0712 by Valdez Pryor RN  Outcome: Progressing  6/11/2025 1755 by Tina Espinoza RN  Outcome: Progressing  Goal: Absence of cardiac dysrhythmias or at baseline  6/12/2025 0712 by Valdez Pryor RN  Outcome: Progressing  6/11/2025 1755 by Tina Espinoza RN  Outcome: Progressing     Problem: Genitourinary - Adult  Goal: Urinary catheter remains patent  6/12/2025 0712 by Valdez Pryor RN  Outcome: Progressing  6/11/2025 1755 by Tina Espinoza RN  Outcome: Progressing

## 2025-06-12 NOTE — CARE COORDINATION
CASE MANAGEMENT DISCHARGE SUMMARY      Discharge to: home with [facility] for new SOC.    Precertification completed: n/a  Hospital Exemption Notification (HENS) completed: n/a    IMM given: 06/12/25    New Durable Medical Equipment ordered/agency: n/a    Transportation: private vehicle       Confirmed discharge plan with: nursing and MD     Patient: yes     Facility/Agency, name:  [facility] pulled from epic per agency liaisonOrquidea.

## 2025-06-12 NOTE — PLAN OF CARE
Problem: Pain  Goal: Verbalizes/displays adequate comfort level or baseline comfort level  6/12/2025 1047 by Tina Espinoza RN  Outcome: Progressing  6/12/2025 0712 by Valdez Pryor RN  Outcome: Progressing     Problem: Discharge Planning  Goal: Discharge to home or other facility with appropriate resources  6/12/2025 1047 by Tina Espinoza RN  Outcome: Progressing  6/12/2025 0712 by Valdez Pryor RN  Outcome: Progressing     Problem: Safety - Adult  Goal: Free from fall injury  6/12/2025 1047 by Tina Espinoza RN  Outcome: Progressing  6/12/2025 0712 by Valdez Pryor RN  Outcome: Progressing     Problem: Skin/Tissue Integrity  Goal: Skin integrity remains intact  Description: 1.  Monitor for areas of redness and/or skin breakdown2.  Assess vascular access sites hourly3.  Every 4-6 hours minimum:  Change oxygen saturation probe site4.  Every 4-6 hours:  If on nasal continuous positive airway pressure, respiratory therapy assess nares and determine need for appliance change or resting period  6/12/2025 1047 by Tina Espinoza RN  Outcome: Progressing  6/12/2025 0712 by Valdez Pryor RN  Outcome: Progressing     Problem: Cardiovascular - Adult  Goal: Maintains optimal cardiac output and hemodynamic stability  6/12/2025 1047 by Tina Espinoza RN  Outcome: Progressing  6/12/2025 0712 by Valdez Pryor RN  Outcome: Progressing  Goal: Absence of cardiac dysrhythmias or at baseline  6/12/2025 1047 by Tina Espinoza RN  Outcome: Progressing  6/12/2025 0712 by Valdez Pryor RN  Outcome: Progressing

## 2025-06-12 NOTE — PROGRESS NOTES
06/07/25 1510   Patient Observation   Pulse 54   Respirations 21   SpO2 100 %   Breath Sounds   Respiratory Pattern Regular   Vent Information   Ventilator Safety Check Performed Pre-Use Yes   Ventilator Initiate Yes   Vent Mode AC/PRVC   Ventilator Settings   Target Vt 440   Resp Rate (Set) 16 bpm   PEEP/CPAP (cmH2O) 8   FiO2  100 %   Insp Time (sec) 0.9 sec   Vent Patient Data (Readings)   Vt (Measured) 406 mL   Peak Inspiratory Pressure (cmH2O) 28 cmH2O   Rate Measured 21 br/min   Minute Volume (L/min) 9.84 Liters   Mean Airway Pressure (cmH2O) 14 cmH20   Plateau Pressure (cm H2O) 0 cm H2O   Driving Pressure -8   I:E Ratio 1:1.80   Static Compliance (L/cm H2O) 0   I Time/ I Time % 0.9 s   Backup Apnea On   Vent Alarm Settings   High Pressure (cmH2O) 40 cmH2O   Low Minute Volume (lpm) 2 L/min   High Minute Volume (lpm) 20 L/min   Low Exhaled Vt (ml) 200 mL   High Exhaled Vt (ml) 1000 mL   RR High (bpm) 40 br/min   Apnea (secs) 20 secs   Additional Respiratoray Assessments   Humidification Source HME   Ambu Bag With Mask At Bedside Yes   ETT    Placement Date/Time: 06/07/25 1002   Present on Admission/Arrival: No  Placed By: In surgery  Placement Verified By: Auscultation;Capnometry  Preoxygenation: Yes  Mask Ventilation: Ventilated by mask (1)  Technique: Video laryngoscopy  Airway Type: Cu...   Secured At 22 cm   Measured From Lips   ETT Placement Center   Secured By Commercial tube giron   Cuff Pressure 30 cm H2O   Tie/Giron Changed Yes   Supplemental Gases   Supplemental Gases None       
   06/07/25 1657   ETT    Placement Date/Time: 06/07/25 1002   Present on Admission/Arrival: No  Placed By: In surgery  Placement Verified By: Auscultation;Capnometry  Preoxygenation: Yes  Mask Ventilation: Ventilated by mask (1)  Technique: Video laryngoscopy  Airway Type: Cu...   Secured At (S)  23 cm   Measured From Lips   ETT Placement Center   Secured By Commercial tube haines         Advanced ETT 1cm.  
   06/07/25 1929   Patient Observation   Pulse (!) 101   Respirations 25   SpO2 94 %   Breath Sounds   Right Upper Lobe Clear   Right Middle Lobe Diminished   Right Lower Lobe Diminished   Left Upper Lobe Clear   Left Lower Lobe Diminished   Vent Information   Vent Mode AC/PRVC   Ventilator Settings   Vt (Set, mL) 440 mL   Resp Rate (Set) 16 bpm   PEEP/CPAP (cmH2O) 5   FiO2  40 %   Pressure Support (cm H2O) 0 cm H2O   Insp Time (sec) 0.9 sec   Vent Patient Data (Readings)   Vt (Measured) 514 mL   Peak Inspiratory Pressure (cmH2O) 33 cmH2O   Rate Measured 25 br/min   Minute Volume (L/min) 12 Liters   Mean Airway Pressure (cmH2O) 14 cmH20   Plateau Pressure (cm H2O) 0 cm H2O   Driving Pressure -5   I:E Ratio 1:1.70   Static Compliance (L/cm H2O) 0   I Time/ I Time % 0.9 s   Vent Alarm Settings   High Pressure (cmH2O) 40 cmH2O   Low Minute Volume (lpm) 2 L/min   High Minute Volume (lpm) 20 L/min   Low Exhaled Vt (ml) 200 mL   RR High (bpm) 45 br/min   Additional Respiratoray Assessments   Humidification Source HME   Ambu Bag With Mask At Bedside Yes   ETT    Placement Date/Time: 06/07/25 1002   Present on Admission/Arrival: No  Placed By: In surgery  Placement Verified By: Auscultation;Capnometry  Preoxygenation: Yes  Mask Ventilation: Ventilated by mask (1)  Technique: Video laryngoscopy  Airway Type: Cu...   Secured At 23 cm   Measured From Lips   ETT Placement Left   Secured By Commercial tube haines   Site Assessment Dry   Cuff Pressure 26 cm H2O       
   06/09/25 0840   Treatment   Treatment Type IS   Oxygen Therapy/Pulse Ox   Pulse 78   Respirations 21   SpO2 90 %   Incentive Spirometry Tx   Treatment Effort Subsequent;Assisted by RT;Needs encouragement   Maximum Achieved Volume (mL) 500 mL   Number of Breaths 10     Patient has pain, splinting limiting volume  
  PRELIMINARY PROCEDURE REPORT        INDICATION FOR PROCEDURE  Unstable angina, elevated troponin      PROCEDURE FINDINGS  Successful left heart cath via right radial approach, access closed with TR band with excellent hemostasis  Critical 90% stenosis in ostial left main atherosclerotic versus slit left main with severe pressure dampening on catheter engagement.  Complete angiogram could not be performed due to hemodynamic instability with catheter engagement and contrast injections.  She has minimal CAD otherwise  Intra-aortic balloon pump placement via right femoral approach with good response with 1:1 augmentation  Normal LV function, LVEF 65% with normal wall motion  Normal left-sided filling pressures, LVEDP 12 mmHg suggesting normal volume status      PLAN/RECOMMENDATIONS  Continue IV nitroglycerin, titrate to chest pain and blood pressure control  Start low-dose IV heparin once TR band removed, continue to hold home [facility]  CT surgery consult for CABG evaluation  Complete bedrest while balloon pump is in place  Continue aspirin 81, low-dose simvastatin.  Patient previously intolerant to rosuvastatin and atorvastatin      No complications.    See full report for details.      Gomez Perry MD, FACC, [facility]  Interventional Cardiology  [facility]  [phone] (c)    
  Physician Progress Note      PATIENT:               KELLY CAMPBELL  CSN #:                  [id number]  :                       1960  ADMIT DATE:       2025 8:35 AM  DISCH DATE:  RESPONDING  PROVIDER #:        Meghan Wayne MD          QUERY TEXT:    Dear Dr. Wayne,  Pt has low WBC, H/H and plt and has known Ovarian cancer with last received   chemo tx noted by [facility] as   Based on your medical judgment, please clarify these findings and document if   any of the following are being evaluated and/or treated:    The clinical indicators include:  Hx Ovarian cancer on chemo tx, recent PE on Eliquis, CAD, HTN   ED for CP,   noted \"underwent cardiac cath yesterday that showed critical ostial left   main stenosis.  Intra-aortic balloon pump placed for unstable angina\" on Nitro   and Hep gtt and pending CABG  [facility] notes last received treatment 2025 with cycle 6. Plan was to   transition to maintenance therapy after repeat imaging 2025)Treatment   remains on hold at this time   WBC=3.9, H/H=9.0/27, Plt=98  Options provided:  -- Antineoplastic (chemotherapy) induced pancytopenia  -- Antineoplastic (chemotherapy) induced pancytopenia and pancytopenia related   to disease  -- Other - I will add my own diagnosis  -- Disagree - Not applicable / Not valid  -- Disagree - Clinically unable to determine / Unknown  -- Refer to Clinical Documentation Reviewer    PROVIDER RESPONSE TEXT:    Patient has antineoplastic (chemotherapy) induced pancytopenia.    Query created by: Jannie Alan on 2025 11:30 AM      Electronically signed by:  Meghan Wayne MD 2025 11:34 AM          
Attempted to place patient on SBT.  RR in 50's, tidal volumes less than 100cc.  Placed back on previous settings.  
Bedside spirometry completed - placed in pt folder  
CV SURGERY ATTENDING  Patient seen and examined, cath and chart reviewed. Agreeable to proceed with urgent CABG, r/b reviewed.  
CVOR Alert sent to change time per Gabe Rogers NP    Anesthesia aware and Team also aware. TIME is now 1000 Surge time.   
Department of Cardiovascular and Thoracic Surgery  Progress note    POD 1     1) Exploration of SVG in right thigh (not harvested) 2) Off-pump CABG x 2: LIMA-LAD, fRIMA (Y-graft from LIMA)-OM.     Surgeon: Candido Mustafa MD    Pre op EF 55 /post op EF 55  Admit to extubation interval time -  @ 1500 -  @ 2335 (8.5 hr)    Subjective : C/o back discomfort from lying flat.     Objective    Alert, oriented , resting in bed, NAD,  family at bedside  IABP 1:2 , R groin site w/o hematoma.   S1 S2 normal. SR on monitor  Lungs diminished  Abdomen rounded, soft. Normoactive bowel sounds  MSI incision  w/ drsg CDI     Vitals    Temp (24hrs), Av °F (37.2 °C), Min:98.9 °F (37.2 °C), Max:99.1 °F (37.3 °C)      /73   Pulse (!) 114   Temp 98.9 °F (37.2 °C) (Bladder)   Resp (!) 33   Ht 1.651 m (5' 5\")   Wt 102.1 kg (225 lb 1.4 oz)   LMP  (LMP Unknown)   SpO2 91%   BMI 37.46 kg/m²  O2 Flow Rate (L/min): 2 L/min    [place]-Merlin  CVP (Mean): 15 mmHg  CO (l/min): 4.2 l/min  CI (l/min/m2): 2 l/min/m2  : 0.88     Weights  Pre op Weight  99.1 kg    Patient Vitals for the past 96 hrs (Last 3 readings):   Weight   25 0730 102.1 kg (225 lb 1.4 oz)   25 0600 102.7 kg (226 lb 6.6 oz)   25 0630 99.1 kg (218 lb 7.6 oz)        I/O last 24 hours:     IN: 4.7 L   UOP:  2.1 L   Net: + 2.2 L      Cardiac Meds  bASA  Lopressor 25 mg BID  Lasix 20 mg IV x 1    Non Cardiac Meds  Tylenol 650 mg Q6H  oxycodone 5-10 mg Q4H PRN  Senna BID  Miralax daily  Protonix daily  Venofer 200 mg IV Q24  Zyprexa  Oxycodone PRN    DVT prophylaxis: Lovenox    GI prophylaxis: Protonix    Data  CBC:   Recent Labs     25  0435 25  1017 25  0520 25  0538 25  1856 25  0445   WBC 4.2  --  7.4  --   --  8.5   HGB 8.8*   < > 8.1* 8.6* 9.3* 7.6*   HCT 25.8*  --  24.2*  --   --  22.8*   MCV 99.0  --  97.3  --   --  97.1   PLT 79*  --  97*  --   --  97*    < > = values in this interval not displayed. 
Department of Cardiovascular and Thoracic Surgery  Progress note    POD 2     1) Exploration of SVG in right thigh (not harvested) 2) Off-pump CABG x 2: LIMA-LAD, fRIMA (Y-graft from LIMA)-OM.     Surgeon: Candido Mustafa MD    Pre op EF 55 /post op EF 55  Admit to extubation interval time -  @ 1500 -  @ 2335 (8.5 hr)    Subjective : Anxious about post-op recovery and doing too much    Objective    Alert, oriented , OOB to chair, NAD, family at bedside  S1 S2 normal. SR on monitor  Lungs diminished  Abdomen rounded, soft. Normoactive bowel sounds  MSI incision  w/ drsg CDI     Vitals    Temp (24hrs), Av.9 °F (37.2 °C), Min:98.7 °F (37.1 °C), Max:99.1 °F (37.3 °C)      /71   Pulse 74   Temp 99 °F (37.2 °C) (Bladder)   Resp 18   Ht 1.651 m (5' 5\")   Wt 102.1 kg (225 lb 1.4 oz)   LMP  (LMP Unknown)   SpO2 100%   BMI 37.46 kg/m²  O2 Flow Rate (L/min): 2 L/min    [place]-Merlin  CVP (Mean): 15 mmHg  CO (l/min): 4.2 l/min  CI (l/min/m2): 2 l/min/m2  : 0.88     Weights  Pre op Weight 6/7 99.1 kg    Patient Vitals for the past 96 hrs (Last 3 readings):   Weight   25 0730 102.1 kg (225 lb 1.4 oz)   25 0600 102.7 kg (226 lb 6.6 oz)   25 0630 99.1 kg (218 lb 7.6 oz)        I/O last 24 hours:     IN: 1.5 L   UOP:  2.7 L   Net: -1.3L      Cardiac Meds  Amiodarone @ 1 mg/min  bASA  Lopressor 25 mg BID  Lasix 40 mg IV x 1     Non Cardiac Meds  Tylenol 650 mg Q6H  oxycodone 5-10 mg Q4H PRN  Senna BID  Miralax daily  Protonix daily  Venofer 200 mg IV Q24  Zyprexa  Oxycodone PRN    DVT prophylaxis: Lovenox    GI prophylaxis: Protonix    Data  CBC:   Recent Labs     25  0435 25  1017 25  0520 25  0538 25  1856 25  0445   WBC 4.2  --  7.4  --   --  8.5   HGB 8.8*   < > 8.1* 8.6* 9.3* 7.6*   HCT 25.8*  --  24.2*  --   --  22.8*   MCV 99.0  --  97.3  --   --  97.1   PLT 79*  --  97*  --   --  97*    < > = values in this interval not displayed.     BMP:   Recent 
Department of Cardiovascular and Thoracic Surgery  Progress note    POD 3      1) Exploration of SVG in right thigh (not harvested) 2) Off-pump CABG x 2: LIMA-LAD, fRIMA (Y-graft from LIMA)-OM.     Surgeon: Candido Mustafa MD    Pre op EF 55 /post op EF 55  Admit to extubation interval time -  @ 1500 -  @ 2335 (8.5 hr)    Subjective : Anxious about post-op recovery and doing too much    Objective    Alert, oriented , OOB to chair, NAD, family at bedside  S1 S2 normal. SR on monitor  Lungs diminished  Abdomen rounded, soft. Normoactive bowel sounds  MSI incision  w/ drsg CDI     Vitals    Temp (24hrs), Av.7 °F (37.1 °C), Min:98.6 °F (37 °C), Max:99 °F (37.2 °C)      /68   Pulse 73   Temp 98.7 °F (37.1 °C) (Oral)   Resp 22   Ht 1.651 m (5' 5\")   Wt 102.1 kg (225 lb 1.4 oz)   LMP  (LMP Unknown)   SpO2 98%   BMI 37.46 kg/m²  O2 Flow Rate (L/min): 1 L/min      Weights  Pre op Weight 6/7 99.1 kg    Patient Vitals for the past 96 hrs (Last 3 readings):   Weight   25 0730 102.1 kg (225 lb 1.4 oz)   25 0600 102.7 kg (226 lb 6.6 oz)   25 0630 99.1 kg (218 lb 7.6 oz)        I/O last 24 hours:     IN: 2.1  L   UOP:  2.8 L   Net: - 645 mL      Cardiac Meds  Amiodarone 400 mg PO BID  bASA  Lopressor 25 mg BID  Lasix 20 mg IV BID    Non Cardiac Meds  Tylenol 650 mg Q6H  oxycodone 5-10 mg Q4H PRN  Senna BID  Miralax daily  Protonix daily  Venofer 200 mg IV Q24  Zyprexa  Oxycodone PRN  Kcl 40 meq BID    DVT prophylaxis: Lovenox    GI prophylaxis: Protonix    Data  CBC:   Recent Labs     25  0520 25  0538 25  1856 25  0445 06/10/25  0450   WBC 7.4  --   --  8.5 7.7   HGB 8.1*   < > 9.3* 7.6* 7.2*   HCT 24.2*  --   --  22.8* 21.9*   MCV 97.3  --   --  97.1 97.8   PLT 97*  --   --  97* 98*    < > = values in this interval not displayed.     BMP:   Recent Labs     25  0520 255 06/10/25  0450    140 140   K 5.0 4.0 4.9    104 104   CO2 20* 24 
Department of Cardiovascular and Thoracic Surgery  Progress note    Subjective Seen in bed this morning w/ femoral IABP in place, family at bedside. Denies chest pain and SOB. S/p LHC w/ IABP placement 6/4/25    Objective    Alert, oriented   S1 S2 normal. NSR on monitor  Lungs CTAB  Abdomen soft, non tender. active bowel sounds  RFA IABP in place, 1:1, good augmentation, distal pulses intact     Vitals  Afebrile  HR 50s-60s  SBP 110s-130s  SpO2 94-98% on room air    Weights    Patient Vitals for the past 96 hrs (Last 3 readings):   Weight   06/06/25 0600 98.8 kg (217 lb 13 oz)   06/05/25 0957 98.4 kg (217 lb)   06/05/25 0500 98.7 kg (217 lb 9.5 oz)      I/O last 24 hours:     IN: 1.4 L  UOP: 2.1 L  Net: -678 mL    Data  CBC:   Recent Labs     06/05/25  0505 06/05/25  1013 06/06/25  0353 06/06/25  0615   WBC 2.9*  --  4.0 3.9*   HGB 9.4* 9.6* 9.0* 9.0*   HCT 28.0*  --  27.1* 27.0*   MCV 98.3  --  99.6 98.6     --  104* 98*     BMP:   Recent Labs     06/04/25  0851 06/05/25  0505 06/06/25  0353    137 136   K 4.6 4.5 4.2    101 102   CO2 23 25 25   BUN 29* 21* 20   CREATININE 1.1 0.9 1.0   GLUCOSE 94 110* 98     PT/INR:   Recent Labs     06/04/25  1732 06/06/25  0353   PROTIME 14.8 14.0   INR 1.14 1.06     EKG 6/4/25 0834- NSR w/ inferior and anterolateral ST depression     Echo 5/28/24    Left Ventricle: Normal left ventricular systolic function with a visually estimated EF of 55 - 60%. Left ventricle size is normal. Normal wall thickness. Normal wall motion. Normal diastolic function.    Aortic Valve: Mild regurgitation.    Mitral Valve: Trace regurgitation.    Tricuspid Valve: Trace-mild regurgitation. The estimated RVSP is 35 mmHg.     CT Chest PE 6/4/25  Heart/Great Vessels: Normal.   1. Small volume bilateral pulmonary emboli without sequelae of right heart  strain.  2. Groundglass opacities bilaterally which may represent sequelae of air  trapping or mild edema.    CXR 6/4/25 
Department of Cardiovascular and Thoracic Surgery  Progress note    Subjective Seen in bed this morning w/ femoral IABP in place, family at bedside. Denies chest pain and SOB. S/p LHC w/ IABP placement yesterday    Objective    Alert, oriented   S1 S2 normal. NSR on monitor  Lungs CTAB  Abdomen soft, non tender. active bowel sounds  RFA IABP in place, 1:1, good augmentation, distal pulses intact     Vitals  Afebrile  HR 50s-60s  SBP 120s-130s  SpO2 94-98% on room air    Weights    Patient Vitals for the past 96 hrs (Last 3 readings):   Weight   06/05/25 0500 98.7 kg (217 lb 9.5 oz)   06/04/25 0843 98.2 kg (216 lb 6.4 oz)      I/O last 24 hours:     IN: 473 mL  UOP: 830 mL  Net: -362 mL    Cardiac Meds  bASA  NTG infusion  Heparin infusion    Non Cardiac Meds  Bactroban  Protonix    Data  CBC:   Recent Labs     06/04/25  0851 06/04/25  1732 06/05/25  0505   WBC 3.4* 3.0* 2.9*   HGB 11.5* 10.1* 9.4*   HCT 34.9* 30.6* 28.0*   .5* 99.3 98.3    152 140     BMP:   Recent Labs     06/04/25  0851 06/05/25  0505    137   K 4.6 4.5    101   CO2 23 25   BUN 29* 21*   CREATININE 1.1 0.9   GLUCOSE 94 110*     PT/INR:   Recent Labs     06/04/25  1732   PROTIME 14.8   INR 1.14     EKG 6/4/25 0834- NSR w/ inferior and anterolateral ST depression     Echo 5/28/24    Left Ventricle: Normal left ventricular systolic function with a visually estimated EF of 55 - 60%. Left ventricle size is normal. Normal wall thickness. Normal wall motion. Normal diastolic function.    Aortic Valve: Mild regurgitation.    Mitral Valve: Trace regurgitation.    Tricuspid Valve: Trace-mild regurgitation. The estimated RVSP is 35 mmHg.     CT Chest PE 6/4/25  Heart/Great Vessels: Normal.   1. Small volume bilateral pulmonary emboli without sequelae of right heart  strain.  2. Groundglass opacities bilaterally which may represent sequelae of air  trapping or mild edema.    CXR 6/4/25 0900    IMPRESSION:  No acute cardiopulmonary 
Epicardial wires cut by Chris VALERIO. Family here. Belongings packed & discharge instructions provided.  
IABP removed at bedside by CTS NP. Manual pressure held. Pt tolerated well.     Electronically signed by Sheila Vasquez RN on 6/8/2025 at 11:57 AM    
NIF -37, pt extubated to 4 liter NC, tolerated well.    
Occupational Therapy  Facility/Department: [facility] C2 CARD TELEMETRY  Occupational Therapy Initial Assessment and Treatment    Name: Iris Becerra  : 1960  MRN: [id number]  Date of Service: 2025    Discharge Recommendations:  24 hour supervision or assist, Home with Home health OT, S Level 1  OT Equipment Recommendations  Equipment Needed: Yes  Mobility Devices: ADL Assistive Devices  ADL Assistive Devices: Transfer Tub Bench  Other: Pt will benefit from TTB to increase safety and IND during shower transfers and bathing tasks 2/2 decreased balance, endurance, and ability to use BUE post-CABG     If pt is unable to be seen after this session, please let this note serve as discharge summary.  Please see case management note for discharge disposition.  Thank you.    Patient Diagnosis(es): The primary encounter diagnosis was NSTEMI (non-ST elevated myocardial infarction) ([facility]). Diagnoses of CAD (coronary artery disease) and Chest pain, unspecified type were also pertinent to this visit.  Past Medical History:  has a past medical history of Abdominal pain, Anxiety, CAD (coronary artery disease), Cancer (HCC), Diverticulitis large intestine w/o perforation or abscess w/o bleeding, GERD (gastroesophageal reflux disease), Hypercholesteremia, Hypertension, Menorrhagia, Migraine, Ovarian cancer ([facility]), and Panic attacks.  Past Surgical History:  has a past surgical history that includes Endometrial biopsy; Dilation and curettage of uterus (2011); Colonoscopy; Hysterectomy (); Abdominal exploration surgery (2018); Tunneled venous port placement (Right, 2018); Ovary removal; Cardiac procedure (N/A, 2025); Cardiac procedure (N/A, 2025); invasive vascular (N/A, 2025); and Coronary artery bypass graft (N/A, 2025).           Assessment  Performance deficits / Impairments: Decreased functional mobility ;Decreased endurance;Decreased ADL status;Decreased balance;Decreased 
Patient admitted to CVU from CVOR and attached to ventilator and monitors.  Report received from anesthesiologist.  Chest x-ray ordered and reviewed by Dr. Pathak.  Labs drawn and sent.  Assessment complete.  Hemodymanics stable and will continue to monitor.  Family let back to see patient.  Visiting hours reviewed and all questions answered. Family aware of discharge class.     RECOVERY PERIOD BEGINS  1500    Electronically signed by Sheila Vasquez RN on 6/7/2025 at 4:45 PM     
Patient awake and following commands.  Patient placed on CPAP per open heart protocol.  ABG drawn 60 minutes later and WDL.  Respiratory called for weaning parameters.  NIF - 37.  Patient suctioned and extubated to 4 liters nasal cannula    Patient tolerated well.  Oxygen saturation 94 on 4 liters nasal cannula.  Patient alert and oriented X 4.    Hemodynamics stable.       BELKIS Zarate RN       
Per order, pt was taken down to 1:3 ratio on IABP this am from  to assess readiness to wean from pump.     Pt tolerated extremely well.    Cardiac output 9.8   Cardiac Index 4.8     Arterial bp 121/66(91).      UOP for that period was 45.     ABG WDL.      PT has now been placed back on 1:2 support until CTS rounds this am and further plan of care considered.        Amina Zarate RN   
Per pt chart, pt never a smoker. No smoking cessation education given.  
Physical Therapy  Facility/Department: [facility] C2 CARD TELEMETRY  Physical Therapy Initial Assessment    Name: Iris Becerra  : 1960  MRN: [id number]  Date of Service: 2025    Discharge Recommendations:  24 hour supervision or assist, Home with Home health PT   PT Equipment Recommendations  Equipment Needed: No    If pt is unable to be seen after this session, please let this note serve as discharge summary.  Please see case management note for discharge disposition.  Thank you.        Patient Diagnosis(es): The primary encounter diagnosis was NSTEMI (non-ST elevated myocardial infarction) ([facility]). Diagnoses of CAD (coronary artery disease) and Chest pain, unspecified type were also pertinent to this visit.  Past Medical History:  has a past medical history of Abdominal pain, Anxiety, CAD (coronary artery disease), Cancer (HCC), Diverticulitis large intestine w/o perforation or abscess w/o bleeding, GERD (gastroesophageal reflux disease), Hypercholesteremia, Hypertension, Menorrhagia, Migraine, Ovarian cancer ([facility]), and Panic attacks.  Past Surgical History:  has a past surgical history that includes Endometrial biopsy; Dilation and curettage of uterus (2011); Colonoscopy; Hysterectomy (); Abdominal exploration surgery (2018); Tunneled venous port placement (Right, 2018); Ovary removal; Cardiac procedure (N/A, 2025); Cardiac procedure (N/A, 2025); and invasive vascular (N/A, 2025).    Assessment  Body Structures, Functions, Activity Limitations Requiring Skilled Therapeutic Intervention: Decreased functional mobility ;Decreased strength;Decreased endurance;Decreased balance;Increased pain  Assessment: Pt presents to [facility] for CAD s/p CABG . Pt extubated . Pt reports baseline of living with spouse, 4+4 CARIE and with 1 HR. Pt INDEP for mobility with no AD at baseline and working full time. Pt presents below baseline for PT eval with increased pain, decreased strength, 
Physical Therapy  Facility/Department: [facility] C2 CARD TELEMETRY  Daily Treatment Note  NAME: Iris Becerra  : 1960  MRN: [id number]    Date of Service: 6/10/2025    Discharge Recommendations:  24 hour supervision or assist, Home with Home health PT   PT Equipment Recommendations  Equipment Needed: No    Patient Diagnosis(es): The primary encounter diagnosis was NSTEMI (non-ST elevated myocardial infarction) (HCC). Diagnoses of CAD (coronary artery disease) and Chest pain, unspecified type were also pertinent to this visit.    Assessment  Assessment: Pt with good participation in therapy this date and demos improved mobility and endurance. Pt completes all transfers from the bed and chair with SBA, ambulates with a rollator and CGA progressing to SBA in the barrientos, and was able to navigate up<>down 4 steps with CGA and cues for sequencing. Pt requires 3 standing rest breaks with all mobility due to SOB and fatigue, SpO2 remains above 90% and HR between below 100 this date. Pt will continue to benefit from skilled PT services to address current deficits. Continue to rec home with / and HHPT upon DC.  Activity Tolerance: Patient tolerated treatment well  Equipment Needed: No    Plan  Physical Therapy Plan  General Plan: 5-7 times per week  Current Treatment Recommendations: Strengthening;ROM;Balance training;Functional mobility training;Transfer training;Endurance training;Gait training;Neuromuscular re-education;Home exercise program;Safety education & training;Positioning;Equipment evaluation, education, & procurement;Therapeutic activities;Co-Treatment;Stair training    Restrictions  Restrictions/Precautions  Restrictions/Precautions: General Precautions, Cardiac  Activity Level: Up as Tolerated  Position Activity Restriction  Sternal Precautions: No Pushing, No Pulling, 5# Lifting Restrictions  Other Position/Activity Restrictions: ICU monitoring, lechuga, CORIE     Subjective   Subjective  Subjective: Pt in 
Physical Therapy & Occupational Therapy  Orders received, chart reviewed. Upon attempt at 1415, patient still on bedrest from IABP removal. Will follow up as able and medically appropriate.    Ashley Dunlap, PT, DPT  Winifred Ray MS, OTR/L      
Placed pt back on a rate due to multiple periods of tachypnea.    
Pt discharged home with family via w/c to car. Assisted pt in to back seat with seatbelt over heart pillow.  
Pt extubated and doing well.  Taking ice chips and voice is strong.  Keeping on low dose precedex as she has anxiety at baseline.  She is awake, alert, oriented.      I have taken her IABP down to 1:2 and her hemodynamics appear WDL.  Output is 4.7 and index is 2.3.  Sp02 on 4L is 94%.  BP is 118/72(90) on 5mg/H cardene.  SVR 1450.      UOP stable, adquate but stable amount out of chest tubes.  Pt with good clinical presentation.  Denies need for pain med at this time.      Will recheck ABG shortly and will lower IABP again at 0500, assess, then will resume 1:2 until team rounds in morning.      Plan of care ongoing.      Tessa   
Pt in Post Cath recovery waiting on ICU bed.  IABP 1:1.  monitored  
Pt prepped for [facility] this morning. All consents signed. Pt given a unit of PRBCs and a unit of Platelets before being taken to surgery. Report given to FABIO Angel RN. Pt and family educated on expectations of surgery during the recovery period. All questions addressed and answered at this time.     Pt transported to OR.    Electronically signed by Sheila Vasquez RN on 6/7/2025 at 10:00 AM    
Pt report from [place].  Several failed attempts at SBT today.      Pt noted to be anxious.  Had refused teaching by this RN last night re the extubation process, was too anxious to hear about it.  She is now wide awake on the vent.  She is markedly anxious, pointing, mouthing around the tube, gagging.   Having some sinus tach and nods \"yes\" when I ask her if she is feeling as if she cannot take deep breaths.  Pt also on HOB restrictions r/t groin access for IABP, so I cannot sit her high fowlers like I normally would for extubation process.      D/w Dr Mustafa, he agrees we can try some precedex.  I gave her 1mg versed and that did help, but the duration is short.       Otherwise, pt is mildly tachycardiac, titrating cardene for hemodynamic goals.   CVP and SV numbers are not correlating, minding UOP for idea of volume status.   Pt following all commands.   Overbreathing vent which is now in ACVC mode.      Will allow precedex drip to take affect and then re attempt an SBT.  Pt  may also require bipap post extubation, ok per Dr Mustafa if we need to extubate to bipap.      This RN vigilant at bedside.   Plan of care ongoing.   Tessa   
Removed Arterial line, x3 chest tubes, cordis, and increased beta blocker from 12.5 to 25 BID per Dr. Mustafa at time of mid day check in. Cardene weaned off.     Electronically signed by Sheila Vasquez RN on 6/8/2025 at 8:41 PM    
Report given to MIKO Mcdonough, ICU.  Pt is stable, procedural sites in right femoral and the right Radial artery are wnl.  IABP 1:1  
Shift: 0101-1993    Reason for ICU Admission: IABP insertion    Most recent vitals: /66   Pulse 58   Temp 97 °F (36.1 °C) (Bladder)   Resp 20   Ht 1.651 m (5' 5\")   Wt 98.7 kg (217 lb 9.5 oz)   LMP  (LMP Unknown)   SpO2 98%   BMI 36.21 kg/m²      Drip rates at handoff:    sodium chloride      sodium chloride      heparin (PORCINE) Infusion 10 Units/kg/hr (06/04/25 2300)    nitroGLYCERIN 55 mcg/min (06/05/25 0604)       Current O2:   [x] Room Air   [] NC  L   [] BiPaP    [] Vented  % Fi02,  Peep    Hospital Course:  ICU Day # 1 (06/04/25)  -ED from home with CP and HTN. VARGAS x 1 month.   -EKG in ED showed anterolateral and ST depression and elevated trops  -Taken to cath lab-showed severe ostial LM disease (90%). Per [facility] notes the complete angiogram couldn't be complete d/t hemodynamic instability with cath engagement and contrast injection. CTS consult.   -Was going to be taken for emergent CABG but on Eliquis (last dose 6/4 AM) so IABP placed to try to hold off until Friday which is tentative surgery.   -Fransisco gtt for  or below  -Heparin gtt to be started 1 hour after TR band removed  -Completed last course of chemo for this round on 5/29     ICU Day #1 (9169-5806)  Remains on IABP 1:1, tolerating well.  NTG gtt  Heparin gtt  Pain Management (see MAR)  VSS    
Shift: [id number]    Procedure: CORONARY ARTERY BYPASS GRAFT TIMES TWO, OFF PUMP, BILATERAL INTERNAL MAMMARY ARTERIES, LEFT SAPHENOUS VEIN GRAFT EXPLORATION, TRANSESOPHAGEAL ECHOCARDIOGRAM, POSTERIOR PERICARDIOTOMY, BILATERAL PECTORALIS BLOCK (Chest)     Admit from OR (time and date): 6/7/25 1500    Transition Time (Date @ Time)    Most recent vitals: BP (!) 153/72   Pulse (!) 101   Temp 97.6 °F (36.4 °C)   Resp 25   Ht 1.651 m (5' 5\")   Wt 99.1 kg (218 lb 7.6 oz)   LMP  (LMP Unknown)   SpO2 94%   BMI 36.36 kg/m²      Pre-Op Weight Weight - Scale: 98.2 kg (216 lb 6.4 oz)  Today's weight   Wt Readings from Last 1 Encounters:   06/07/25 99.1 kg (218 lb 7.6 oz)         EF: Pre 55  Post 55    Rhythm:    [x] Normal Sinus Rhythm   [] Atrial Fibrillation    [] Sinus Tach   [] Sinus Malik    [] (adverse rhythms)     Pacing Wires Removed:  [] Yes  [x] No   Date Removed:    [] Pulled    [] Clipped     Current O2:   [] Room Air   [] NC  L   [] BiPaP    [x] Vented  Fi02 40  Peep 5    Shift Outputs;  Jane 400 mL  Chest tubes:  M 35 mL  LP 45 mL  RP 50 mL    Air Leak [] Yes  [x] No   If Yes,    Hospital Course:  POD# 0 (6/7/25 Days)  -Out of CVOR at 1500  -minimal CT output  -750 albumin given  -Cardene added; stopped Nitro  -on minimal gtts; cardene, insulin, and KVO  -Pt fully awake and following commands, attempted SBT several times. Pt RR between 35-50 breaths per minute. Resumed previous settings. Tolerating vent w/o sedation.   -stable; remains on IABP    Electronically signed by Sheila Vasquez RN on 6/7/2025 at 7:51 PM   
Shift: 1900 - 0700    Reason for ICU Admission: IABP insertion    Most recent vitals: BP (!) 126/58   Pulse 66   Temp 99.9 °F (37.7 °C) (Bladder)   Resp 15   Ht 1.651 m (5' 5\")   Wt 98.4 kg (217 lb)   LMP  (LMP Unknown)   SpO2 93%   BMI 36.11 kg/m²      Drip rates at handoff:    lactated ringers      sodium chloride      sodium chloride      heparin (PORCINE) Infusion 10 Units/kg/hr (06/05/25 2238)    nitroGLYCERIN 50 mcg/min (06/05/25 2239)       Current O2:   [x] Room Air   [] NC  L   [] BiPaP    [] Vented  % Fi02,  Peep    Jane output: 1245mL    Hospital Course:  ICU Day # 1 (06/04/25)  - ED from home with CP and HTN. VARGAS x 1 month.   - EKG in ED showed anterolateral and ST depression and elevated trops  - Taken to cath lab-showed severe ostial LM disease (90%). Per [facility]'s notes the complete angiogram couldn't be complete d/t hemodynamic instability with cath engagement and contrast injection. CTS consult.   - Was going to be taken for emergent CABG but on Eliquis (last dose 6/4 AM) so IABP placed to try to hold off until Friday which is tentative surgery.   - Nitro gtt for  or below  - Heparin gtt to be started 1 hour after TR band removed  - Completed last course of chemo for this round on 5/29     ICU Day #1 (8089-1151)  - Remains on IABP 1:1, tolerating well.  - Continue on NTG gtt  - Heparin gtt  - Pain Management (see MAR)  - [facility]    ICU #2 ([id number])  - Plans for off pump CABG with Dr. Mustafa tomorrow around noon  - Per patient they found blood clots in her lungs on a CT scan about 6 weeks. CT was done for an allergic reaction to her chemo drug (reason for Eliquis)  - Heparin gtt now therapeutic so next blood draw 6/6/25. Heparin gtt off at 1000 per Dr. Mustafa.   - Nitro gtt still on and to be titrated to SBP of 130 or below  - Pt no longer having CP  - Per oncology we are OK to use patient's port. Order placed. Dressing and needle changed today.   - Blood draw through port per patient 
Shift: [id number]    Procedure: CORONARY ARTERY BYPASS GRAFT TIMES TWO, OFF PUMP, BILATERAL INTERNAL MAMMARY ARTERIES, LEFT SAPHENOUS VEIN GRAFT EXPLORATION, TRANSESOPHAGEAL ECHOCARDIOGRAM, POSTERIOR PERICARDIOTOMY, BILATERAL PECTORALIS BLOCK (Chest)     Admit from OR (time and date): 6/7/25 1500    Transition Time (Date @ Time) 6/8/25  1800    Most recent vitals: /61   Pulse 57   Temp 98.8 °F (37.1 °C) (Oral)   Resp 24   Ht 1.651 m (5' 5\")   Wt 102.1 kg (225 lb 1.4 oz)   LMP  (LMP Unknown)   SpO2 97%   BMI 37.46 kg/m²      Pre-Op Weight Weight - Scale: 98.2 kg (216 lb 6.4 oz)  Today's weight   Wt Readings from Last 1 Encounters:   06/09/25 102.1 kg (225 lb 1.4 oz)         EF: Pre 55  Post 55    Rhythm:    [x] Normal Sinus Rhythm   [] Atrial Fibrillation    [] Sinus Tach   [] Sinus Malik    [] (adverse rhythms)     Pacing Wires Removed:  [] Yes  [x] No   Date Removed:    [] Pulled    [] Clipped     Current O2:   [x] Room Air   [] NC     [] BiPaP    [] Vented  Fi02  Peep     Shift Outputs;  Jane 1600  Chest tubes:      Air Leak [] Yes  [x] No   If Yes,    Hospital Course:  POD# 0 (6/7/25 Days)  -Out of CVOR at 1500  -minimal CT output  -750 albumin given  -Cardene added; stopped Nitro  -on minimal gtts; cardene, insulin, and KVO  -Pt fully awake and following commands, attempted SBT several times. Pt RR between 35-50 breaths per minute. Resumed previous settings. Tolerating vent w/o sedation.   -stable; remains on IABP    POD 0 overnight shift 6/7 19-07   Pt had anxiety r/t ETT which worsened after 2000, used low dose precedex during SBT and was able to extubate to 4LNC at 2335.  Pt tolerated well.  PT is on 2L NC this am and resting soundly,, easily arousable   Outputs:  Jane 1425ml    MST 35  LP  95  PRNS  Fentanyl 50mcg x 3 doses, tolerates well   Versed 2mg x1 dose during SBT   Precedex shut off at 0400, kept on low dose after extubation r/t baseline anxiety  2gm Mg+ this am per OHS protocol 
Shift: 2372-3955    Procedure: CORONARY ARTERY BYPASS GRAFT TIMES TWO, OFF PUMP, BILATERAL INTERNAL MAMMARY ARTERIES, LEFT SAPHENOUS VEIN GRAFT EXPLORATION, TRANSESOPHAGEAL ECHOCARDIOGRAM, POSTERIOR PERICARDIOTOMY, BILATERAL PECTORALIS BLOCK (Chest)     Admit from OR (time and date): 6/7/25 1500    Transition Time (Date @ Time) 6/8/25  1800    Most recent vitals: /70   Pulse 59   Temp 98.6 °F (37 °C) (Axillary)   Resp 19   Ht 1.651 m (5' 5\")   Wt 102.1 kg (225 lb 1.4 oz)   LMP  (LMP Unknown)   SpO2 99%   BMI 37.46 kg/m²      Pre-Op Weight Weight - Scale: 98.2 kg (216 lb 6.4 oz)  Today's weight   Wt Readings from Last 1 Encounters:   06/09/25 102.1 kg (225 lb 1.4 oz)         EF: Pre 55  Post 55    Rhythm:    [x] Normal Sinus Rhythm   [] Atrial Fibrillation    [] Sinus Tach   [] Sinus Malik    [] (adverse rhythms)     Pacing Wires Removed:  [] Yes  [x] No   Date Removed:    [] Pulled    [] Clipped     Current O2:   [x] Room Air   [] NC     [] BiPaP    [] Vented  Fi02  Peep     Shift Outputs;  Jane 1600  Chest tubes:      Air Leak [] Yes  [x] No   If Yes,    Hospital Course:  POD# 0 (6/7/25 Days)  -Out of CVOR at 1500  -minimal CT output  -750 albumin given  -Cardene added; stopped Nitro  -on minimal gtts; cardene, insulin, and KVO  -Pt fully awake and following commands, attempted SBT several times. Pt RR between 35-50 breaths per minute. Resumed previous settings. Tolerating vent w/o sedation.   -stable; remains on IABP    POD 0 overnight shift 6/7 19-07   Pt had anxiety r/t ETT which worsened after 2000, used low dose precedex during SBT and was able to extubate to 4LNC at 2335.  Pt tolerated well.  PT is on 2L NC this am and resting soundly,, easily arousable   Outputs:  Jane 1425ml    MST 35  LP  95  PRNS  Fentanyl 50mcg x 3 doses, tolerates well   Versed 2mg x1 dose during SBT   Precedex shut off at 0400, kept on low dose after extubation r/t baseline anxiety  2gm Mg+ this am per O[facility] 
Shift: 3705-2256    Procedure: CORONARY ARTERY BYPASS GRAFT TIMES TWO, OFF PUMP, BILATERAL INTERNAL MAMMARY ARTERIES, LEFT SAPHENOUS VEIN GRAFT EXPLORATION, TRANSESOPHAGEAL ECHOCARDIOGRAM, POSTERIOR PERICARDIOTOMY, BILATERAL PECTORALIS BLOCK (Chest)     Admit from OR (time and date): 6/7/25 1500    Transition Time (Date @ Time) 6/8/25  1800    Most recent vitals: /70   Pulse 66   Temp (!) 100.6 °F (38.1 °C) (Oral)   Resp 22   Ht 1.651 m (5' 5\")   Wt 102.1 kg (225 lb 1.4 oz)   LMP  (LMP Unknown)   SpO2 96%   BMI 37.46 kg/m²      Pre-Op Weight Weight - Scale: 98.2 kg (216 lb 6.4 oz)  Today's weight   Wt Readings from Last 1 Encounters:   06/09/25 102.1 kg (225 lb 1.4 oz)         EF: Pre 55  Post 55    Rhythm:    [x] Normal Sinus Rhythm   [] Atrial Fibrillation    [] Sinus Tach   [] Sinus Malik    [] (adverse rhythms)     Pacing Wires Removed:  [] Yes  [x] No   Date Removed:    [] Pulled    [] Clipped     Current O2:   [x] Room Air   [] NC     [] BiPaP    [] Vented  Fi02  Peep     Shift Outputs:  UO - 1000    Chest tubes: NA      Air Leak [] Yes  [x] No   If Yes,    Hospital Course:  POD# 0 (6/7/25 Days)  -Out of CVOR at 1500  -minimal CT output  -750 albumin given  -Cardene added; stopped Nitro  -on minimal gtts; cardene, insulin, and KVO  -Pt fully awake and following commands, attempted SBT several times. Pt RR between 35-50 breaths per minute. Resumed previous settings. Tolerating vent w/o sedation.   -stable; remains on IABP    POD 0 overnight shift 6/7 19-07   Pt had anxiety r/t ETT which worsened after 2000, used low dose precedex during SBT and was able to extubate to 4LNC at 2335.  Pt tolerated well.  PT is on 2L NC this am and resting soundly,, easily arousable   Outputs:  Jane 1425ml    MST 35  LP  95  PRNS  Fentanyl 50mcg x 3 doses, tolerates well   Versed 2mg x1 dose during SBT   Precedex shut off at 0400, kept on low dose after extubation r/t baseline anxiety  2gm Mg+ this am per [facility] 
Shift: [id number]    Reason for ICU Admission: IABP insertion    Most recent vitals: BP (!) 138/95   Pulse 60   Temp 98 °F (36.7 °C) (Oral)   Resp (!) 9   Ht 1.651 m (5' 5\")   Wt 98.2 kg (216 lb 6.4 oz)   LMP  (LMP Unknown)   SpO2 96%   BMI 36.01 kg/m²      Drip rates at handoff:    sodium chloride      sodium chloride      heparin (PORCINE) Infusion      nitroGLYCERIN         Current O2:   [x] Room Air   [] NC  L   [] BiPaP    [] Vented  % Fi02,  Peep    Hospital Course:  ICU Day # 1 (06/04/25)  -ED from home with CP and HTN. VARGAS x 1 month.   -EKG in ED showed anterolateral and ST depression and elevated trops  -Taken to cath lab-showed severe ostial LM disease (90%). Per [facility]'s notes the complete angiogram couldn't be complete d/t hemodynamic instability with cath engagement and contrast injection. CTS consult.   -Was going to be taken for emergent CABG but on Eliquis (last dose 6/4 AM) so IABP placed to try to hold off until Friday which is tentative surgery.   -Fransisco gtt for  or below  -Heparin gtt to be started 1 hour after TR band removed  -Completed last course of chemo for this round on 5/29    
Shift: 6913-6316    Reason for ICU Admission: IABP insertion    Most recent vitals: /62   Pulse 65   Temp 98 °F (36.7 °C) (Oral)   Resp 18   Ht 1.651 m (5' 5\")   Wt 98.4 kg (217 lb)   LMP  (LMP Unknown)   SpO2 100%   BMI 36.11 kg/m²      Drip rates at handoff:    [START ON 6/6/2025] lactated ringers      sodium chloride      sodium chloride      heparin (PORCINE) Infusion 10 Units/kg/hr (06/05/25 1800)    nitroGLYCERIN 45 mcg/min (06/05/25 1800)       Current O2:   [x] Room Air   [] NC  L   [] BiPaP    [] Vented  % Fi02,  Peep    Jane output: 1245mL    Hospital Course:  ICU Day # 1 (06/04/25)  -ED from home with CP and HTN. VARGAS x 1 month.   -EKG in ED showed anterolateral and ST depression and elevated trops  -Taken to cath lab-showed severe ostial LM disease (90%). Per [facility]'s notes the complete angiogram couldn't be complete d/t hemodynamic instability with cath engagement and contrast injection. CTS consult.   -Was going to be taken for emergent CABG but on Eliquis (last dose 6/4 AM) so IABP placed to try to hold off until Friday which is tentative surgery.   -Fransisco gtt for  or below  -Heparin gtt to be started 1 hour after TR band removed  -Completed last course of chemo for this round on 5/29     ICU Day #1 (5429-6069)  Remains on IABP 1:1, tolerating well.  NTG gtt  Heparin gtt  Pain Management (see MAR)  VSS    ICU #2 ([id number])  -Plans for off pump CABG with Dr. Mustafa tomorrow around noon  -Per patient they found blood clots in her lungs on a CT scan about 6 weeks. CT was done for an allergic reaction to her chemo drug (reason for Eliquis)  -Heparin gtt now therapeutic so next blood draw 6/6/25. Heparin gtt off at 1000 per Dr. Mustafa.   -Nitro gtt still on and to be titrated to SBP of 130 or below  -Blood draw through port per patient   -Pt no longer having CP  -Per oncology we are to use patient's port. Order placed. Dressing and needle changed today.     
Shift: [id number]    Procedure: CORONARY ARTERY BYPASS GRAFT TIMES TWO, OFF PUMP, BILATERAL INTERNAL MAMMARY ARTERIES, LEFT SAPHENOUS VEIN GRAFT EXPLORATION, TRANSESOPHAGEAL ECHOCARDIOGRAM, POSTERIOR PERICARDIOTOMY, BILATERAL PECTORALIS BLOCK (Chest)     Admit from OR (time and date): 6/7/25 1500    Transition Time (Date @ Time) 6/8/25  1800    Most recent vitals: /82   Pulse 80   Temp 99.1 °F (37.3 °C) (Bladder)   Resp 25   Ht 1.651 m (5' 5\")   Wt 102.7 kg (226 lb 6.6 oz)   LMP  (LMP Unknown)   SpO2 96%   BMI 37.68 kg/m²      Pre-Op Weight Weight - Scale: 98.2 kg (216 lb 6.4 oz)  Today's weight   Wt Readings from Last 1 Encounters:   06/08/25 102.7 kg (226 lb 6.6 oz)         EF: Pre 55  Post 55    Rhythm:    [x] Normal Sinus Rhythm   [] Atrial Fibrillation    [] Sinus Tach   [] Sinus Malik    [] (adverse rhythms)     Pacing Wires Removed:  [] Yes  [x] No   Date Removed:    [] Pulled    [] Clipped     Current O2:   [] Room Air   [x] NC  2L   [] BiPaP    [] Vented  Fi02 40  Peep 5    Shift Outputs;  Jane 1600  Chest tubes:      Air Leak [] Yes  [x] No   If Yes,    Hospital Course:  POD# 0 (6/7/25 Days)  -Out of CVOR at 1500  -minimal CT output  -750 albumin given  -Cardene added; stopped Nitro  -on minimal gtts; cardene, insulin, and KVO  -Pt fully awake and following commands, attempted SBT several times. Pt RR between 35-50 breaths per minute. Resumed previous settings. Tolerating vent w/o sedation.   -stable; remains on IABP    POD 0 overnight shift 6/7 19-07   Pt had anxiety r/t ETT which worsened after 2000, used low dose precedex during SBT and was able to extubate to 4LNC at 2335.  Pt tolerated well.  PT is on 2L NC this am and resting soundly,, easily arousable   Outputs:  Jane 1425ml    MST 35  LP  95  PRNS  Fentanyl 50mcg x 3 doses, tolerates well   Versed 2mg x1 dose during SBT   Precedex shut off at 0400, kept on low dose after extubation r/t baseline anxiety  2gm Mg+ this am per [facility] 
Shift: [id number]    Procedure: CORONARY ARTERY BYPASS GRAFT TIMES TWO, OFF PUMP, BILATERAL INTERNAL MAMMARY ARTERIES, LEFT SAPHENOUS VEIN GRAFT EXPLORATION, TRANSESOPHAGEAL ECHOCARDIOGRAM, POSTERIOR PERICARDIOTOMY, BILATERAL PECTORALIS BLOCK (Chest)     Admit from OR (time and date): 6/7/25 1500    Transition Time (Date @ Time) 6/8/25  1800    Most recent vitals: /67   Pulse 66   Temp 99 °F (37.2 °C) (Bladder)   Resp 22   Ht 1.651 m (5' 5\")   Wt 102.7 kg (226 lb 6.6 oz)   LMP  (LMP Unknown)   SpO2 96%   BMI 37.68 kg/m²      Pre-Op Weight Weight - Scale: 98.2 kg (216 lb 6.4 oz)  Today's weight   Wt Readings from Last 1 Encounters:   06/08/25 102.7 kg (226 lb 6.6 oz)         EF: Pre 55  Post 55    Rhythm:    [x] Normal Sinus Rhythm   [] Atrial Fibrillation    [] Sinus Tach   [] Sinus Malik    [] (adverse rhythms)     Pacing Wires Removed:  [] Yes  [x] No   Date Removed:    [] Pulled    [] Clipped     Current O2:   [] Room Air   [x] NC  2L   [] BiPaP    [] Vented  Fi02 40  Peep 5    Shift Outputs;  Jane 1600  Chest tubes:      Air Leak [] Yes  [x] No   If Yes,    Hospital Course:  POD# 0 (6/7/25 Days)  -Out of CVOR at 1500  -minimal CT output  -750 albumin given  -Cardene added; stopped Nitro  -on minimal gtts; cardene, insulin, and KVO  -Pt fully awake and following commands, attempted SBT several times. Pt RR between 35-50 breaths per minute. Resumed previous settings. Tolerating vent w/o sedation.   -stable; remains on IABP    POD 0 overnight shift 6/7 19-07   Pt had anxiety r/t ETT which worsened after 2000, used low dose precedex during SBT and was able to extubate to 4LNC at 2335.  Pt tolerated well.  PT is on 2L NC this am and resting soundly,, easily arousable   Outputs:  Jane 1425ml    MST 35  LP  95  PRNS  Fentanyl 50mcg x 3 doses, tolerates well   Versed 2mg x1 dose during SBT   Precedex shut off at 0400, kept on low dose after extubation r/t baseline anxiety  2gm Mg+ this am per [facility] 
Spontaneous Breathing Trial     Readiness Assessment: Patient meets criteria for spontaneous breathing trial. Started at 2235 hours.     Most recent vitals: /67   Pulse 86   Temp (!) 100.6 °F (38.1 °C) (Bladder)   Resp (!) 37   Ht 1.651 m (5' 5\")   Wt 99.1 kg (218 lb 7.6 oz)   LMP  (LMP Unknown)   SpO2 96%   BMI 36.36 kg/m²      O2 requirements: Oxygen Therapy  SpO2: 96 %  Pulse via Oximetry: 52 beats per minute  Pulse Oximeter Device Mode: Continuous  Pulse Oximeter Device Location: Finger  O2 Device: None (Room air)  Skin Assessment: Clean, dry, & intact  Skin Protection for O2 Device: N/A  FiO2 : 40 %  ETCO2 (mmHg): 36 mmHg  Oxygen Therapy: None (Room air)  Vent Mode: AC/PRVC     Cardiac Rhythm:Normal Sinus Rhythm no ectopy noted, pt on low dose precedex r/t anxiety     Lab Data:  ABG:   Recent Labs     06/07/25  1924 06/07/25  2104   PHART 7.388 7.371   GSK2RII 39.7 39.8   PO2ART 56.0* 121.8*        
  -Pt no longer having CP  -Per oncology we are to use patient's port. Order placed. Dressing and needle changed today.     ICU Day 3 24H synopsis   CABG delayed r/t emergent cases, pt graciously allowed delay in scheduling   Pt in overall good spirits, mildly anxious re procedure   1:1 on IABP, good augmentation with device   Remains on heparin and NTG gtts - heparin titrated for PT/PTT overnight, bolus and increase gtt   NTG needs down overnight with sleep.  Pt kept on low dose NTG for decreased afterload and optimization prior to CABG today which is planned for 12noon 6/7   R groin site WDL   CHG pre op per protocol   NPO since MN   10mg oxy given for back pain, added daily robaxin and rested well overnight   UOP WDLankush for urinary retention   Had BM   Note platelets this am  Tessa     
06/11/25  0523   PROTIME 15.3* 14.9 14.3   INR 1.19* 1.15 1.08       CXR 6/11/25      IMPRESSION:     Bibasilar airspace disease, improved from comparison study.    Assessment and Plan    Labs, imaging and notes reviewed    Cardiovascular  Hx HTN  Hx HLD  NSTEMI  Post-op Afib    - s/p off-pump CABG x 2 (6/7).   - IABP 6/4-6/8  - On ASA, statin, BB   -lasix 40 mg IV BID. Became hypotensive to the high 80s after first dose. One 5% albumin given (250 mL)  - Post-op Afib. Received 150 mg bolus X1 followed by the drip protocol. Drip stopped after 12 hours d/t bradycardia. Amio 400 mg PO BID (total load at the time of this note is 2.4 gm)    Pulmonary  Hx PE    - AM CXR reviewed. Atelectasis  - Continue expansion mesures: IS, acapella, OOB  - daily CXR  - Wean O2 as tolerated. Goal O2 > 92%.    Hematology  ABLA  Thrombocytopenia    -Post operative H&H 6.5/19.7 this AM. Expected.   -Post operative plt ct 155  - Venofer 200 mg IV Q24 x 5   - Hold Plavix. Consider adding once plt > 150K  - Daily CBC    Nephrology  -K 5.1, BUN/sCr 20/1.0, Mg 1.96  -UOP 1.5 L in last 24 hours  -Lasix 40 mg IV BID w/ supplemental K and Mg    Gastrointestinal/nutrition  -LBM 6/7  - Continue scheduled bowel regimen     Line Placement Date Removal Date   Arterial line 6/4 6/8   CVC 6/4 6/10   Cortis 6/4 6/8   TPW 6/4    Left Pleural chest  tube 6/4 6/8   Right Pleural chest tube 6/4 6/8   Mediastinal chest tube 6/4 6/8   Jane Catheter 6/4 6/9   PIV 6/4      Dispo: Pending PT/OT recs and clinical course. CM following. Likely anticipate D/C home in next 1-2 days.     PT/OT recommendations: Home w/ HHC PT/OT    IBAN Sparks - CNP  06/11/25  11:01 AM  CTS NP: [phone]      
1  Other: Pt will benefit from TTB to increase safety and IND during shower transfers and bathing tasks 2/2 decreased balance, endurance, and ability to use BUE post-CABG     Plan  Occupational Therapy Plan  Times Per Week: 3-5 (downgraded)  Current Treatment Recommendations: Strengthening;ROM;Balance training;Functional mobility training;Endurance training;Pain management;Safety education & training;Patient/Caregiver education & training;Equipment evaluation, education, & procurement;Self-Care / ADL;Positioning    Restrictions  Restrictions/Precautions  Restrictions/Precautions: General Precautions;Cardiac  Activity Level: Up as Tolerated  Position Activity Restriction  Sternal Precautions: No Pushing;No Pulling;5# Lifting Restrictions    Subjective  Subjective  Subjective: Pt sitting in recliner upon arrival. She is slightly anxious and reporting a lack of confidence in her ADL abilities. Pt is agreeable to ADLs.  Pain: 1/10  Orientation  Overall Orientation Status: Within Functional Limits  Orientation Level: Oriented X4  Pain: 1/10  Cognition  Overall Cognitive Status: WFL       Objective  Vitals  Vitals  Pulse: 66  SpO2: 98 %  BP: 120/64  MAP (Calculated): 83  Bed Mobility Training  Bed Mobility Training: No  Balance  Sitting: Intact  Standing: Impaired  Standing - Static: Occasional;Good  Standing - Dynamic: Occasional;Fair  Transfer Training  Transfer Training: Yes  Overall Level of Assistance: Contact guard assistance;Stand by assistance (CGA progressing to SBA with repetition.)  Sit to Stand: Stand by assistance  Stand to Sit: Stand by assistance  Toilet Transfer: Stand by assistance (Standard commode.)  Gait  Gait Training: Yes  Overall Level of Assistance: Contact guard assistance;Stand by assistance  Distance (ft): 10 Feet  Assistive Device: None (to/from bathroom.)       ADL  Grooming: Stand by assistance  Grooming Skilled Clinical Factors: brushed teeth and washed hands in stance at sink for 4-5 
Normal left ventricular systolic function with a visually estimated EF of 55 - 60%. Left ventricle size is normal. Normal wall thickness. Normal wall motion. Normal diastolic function.    Aortic Valve: Mild regurgitation.    Mitral Valve: Trace regurgitation.    Tricuspid Valve: Trace-mild regurgitation. The estimated RVSP is 35 mmHg.    Image quality is adequate.    Echo: 6/4/2025    Image quality is suboptimal. Technically difficult study due to patient's body habitus and procedure performed with the patient in a supine position.    Left Ventricle: Normal left ventricular systolic function with a visually estimated EF of 60 - 65%. Left ventricle size is normal. Normal wall thickness. No regional wall motion abnormalities identified. Decreased sensitivity due to poor endocardial definition. Global longitudinal strain is reduced. Global longitudinal strain is -13.4%. Normal diastolic function.    Right Ventricle: Right ventricle is mildly dilated. Normal systolic function. TAPSE is 2.5 cm.    Aortic Valve: Mild sclerosis of the aortic valve cusps. Mild regurgitation.     Stress: 3/2023  Abnormal myocardial perfusion study.   There is a small, mild reversible apical anterior and apical lateral defect,   consistent with ischemia.   Normal left ventricular size, systolic function, and wall motion.   Overall findings represent a lower risk abnormal study.     Cath: 6/4/2025  Successful left heart cath via right radial approach, access closed with TR band with excellent hemostasis  Critical 90% stenosis in ostial left main atherosclerotic versus slit left main with severe pressure dampening on catheter engagement.  Complete angiogram could not be performed due to hemodynamic instability with catheter engagement and contrast injections.  She has minimal CAD otherwise  Intra-aortic balloon pump placement via right femoral approach with good response with 1:1 augmentation  Normal LV function, LVEF 65% with normal wall 
Possible need for CABG.      Medications:        Infusion Medications    sodium chloride      sodium chloride      heparin (PORCINE) Infusion 10 Units/kg/hr (06/04/25 2300)    nitroGLYCERIN 55 mcg/min (06/05/25 0604)     Scheduled Medications    mupirocin   Each Nostril BID    sodium chloride flush  5-40 mL IntraVENous 2 times per day    pantoprazole  40 mg Oral QAM AC    aspirin  81 mg Oral Daily     PRN Meds: oxyCODONE **OR** oxyCODONE, sodium chloride flush, sodium chloride, potassium chloride **OR** potassium alternative oral replacement **OR** potassium chloride, magnesium sulfate, ondansetron **OR** ondansetron, polyethylene glycol, acetaminophen **OR** acetaminophen, hydrALAZINE, nitroGLYCERIN, albuterol, sodium chloride, acetaminophen, heparin (porcine), heparin (porcine), HYDROcodone 5 mg - acetaminophen      Physical Exam Performed:      General appearance:  No apparent distress  Respiratory:  Normal respiratory effort without tachypnea.   Cardiovascular:  Regular Rate w/ Regular rhythm.  Abdomen:  Soft, non-tender, non-distended. Bowel sounds normal  Musculoskeletal:  No edema  Neurologic:  Neurovascularly intact without focal sensory/motor deficits.  Psychiatric:  Alert and oriented    /71   Pulse 65   Temp 98 °F (36.7 °C) (Oral)   Resp 18   Ht 1.651 m (5' 5\")   Wt 98.7 kg (217 lb 9.5 oz)   LMP  (LMP Unknown)   SpO2 93%   BMI 36.21 kg/m²     Telemetry:      Personally reviewed and interpreted telemetry (Rhythm Strip) on 6/5/2025.  Patient is currently ON tele demonstrating NSR w/ controlled rate.    Diet: ADULT DIET; Regular    DVT Prophylaxis: Heparin gtt    Code status: Full Code    PT/OT Eval Status: Not yet ordered    Multi-Disciplinary Rounds with Case Management completed on 6/5/2025 with the following recs:     Anticipated Discharge Location: Home     Anticipated Discharge Day/Date:  6/7/25    Barriers to Discharge: Clinical Course    Likely rate limiting factor: Pending work 
am per [facility] protocol   IABP   MN went to 1:2 with stable dynamics  0500 trialed 1:3 with stable dynamics and ABG; resumed 1:2 after 1 hour   Groin/site appears WDL.  No issues from the console/transducer   PO  Pt taking only ice chips, declined 0600 APAP, just doesn't feel ready to take oral med d/t bedrest and HOB restrictions, will reassess this am     Electronically signed by Chiqui Queen RN on 6/8/2025 at 7:08 AM   
setting of Chest pain, Nitro drip and post procedure. Time includes prior records review, lab review, imaging as needed, consulting services review and communication with nursing staff as needed. need for CABG.      Medications:        Infusion Medications    sodium chloride      aminocaproic acid (AMICAR) 10,000 mg in sodium chloride 0.9 % 500 mL infusion      dexmedeTOMIDine      insulin      norepinephrine      lactated ringers 75 mL/hr at 06/07/25 0659    sodium chloride      sodium chloride      nitroGLYCERIN 10 mcg/min (06/07/25 0659)     Scheduled Medications    mupirocin   Each Nostril BID    chlorhexidine gluconate   Topical BID    vancomycin (VANCOCIN) IV  1,500 mg IntraVENous On Call to OR    metoprolol tartrate  12.5 mg Oral Once    sodium chloride flush  5-40 mL IntraVENous 2 times per day    pantoprazole  40 mg Oral QAM AC     PRN Meds: sodium chloride, methocarbamol, oxyCODONE **OR** oxyCODONE, heparin (PF), ondansetron **OR** ondansetron, prochlorperazine, sodium chloride flush, sodium chloride, potassium chloride **OR** potassium alternative oral replacement **OR** potassium chloride, magnesium sulfate, polyethylene glycol, hydrALAZINE, nitroGLYCERIN, albuterol, sodium chloride, acetaminophen, heparin (porcine), heparin (porcine)      Physical Exam Performed:      General appearance:  No apparent distress  Respiratory:  Normal respiratory effort without tachypnea.   Cardiovascular:  Regular Rate w/ Regular rhythm.  Abdomen:  Soft, non-tender, non-distended. Bowel sounds normal  Musculoskeletal:  No edema  Neurologic:  Neurovascularly intact without focal sensory/motor deficits.  Psychiatric:  Alert and oriented    BP (!) 153/72   Pulse 54   Temp 97.6 °F (36.4 °C)   Resp 19   Ht 1.651 m (5' 5\")   Wt 99.1 kg (218 lb 7.6 oz)   LMP  (LMP Unknown)   SpO2 97%   BMI 36.36 kg/m²     Telemetry:      Personally reviewed and interpreted telemetry (Rhythm Strip) on 6/7/2025.  Patient is currently ON 
NP notified. No new orders. WBC count WNL & CXR showing atelectasis. Encourage pulm toilet.   -Small BM x1  -Orders to remove R IJ. Pt wanted to remain in chair all day. Will pass on to night RN to remove once patient back in bed.     POD #4 6/11/25 Days  -Plan to go home tomorrow if there are no problems    Electronically signed by MIKE SORENSEN RN on 6/11/2025 at 11:26 AM   
Specific Education: Pt educated on sternal precautions (no pushing, pulling, lifting), safe mobility, and task modification. stair navigation and therapist positioning, gait/mobilization with no AD  Education Method: Verbal;Demonstration  Barriers to Learning: None  Education Outcome: Verbalized understanding;Continued education needed    AM-PAC - Mobility    AM-PAC Basic Mobility - Inpatient   How much help is needed turning from your back to your side while in a flat bed without using bedrails?: None  How much help is needed moving from lying on your back to sitting on the side of a flat bed without using bedrails?: A Little  How much help is needed moving to and from a bed to a chair?: None  How much help is needed standing up from a chair using your arms?: None  How much help is needed walking in hospital room?: None  How much help is needed climbing 3-5 steps with a railing?: A Little  AM-PAC Inpatient Mobility Raw Score : 22  AM-PAC Inpatient T-Scale Score : 53.28  Mobility Inpatient CMS 0-100% Score: 20.91  Mobility Inpatient CMS G-Code Modifier : CJ         Therapy Time   Individual Concurrent Group Co-treatment   Time In 1133         Time Out 1201         Minutes 28         Timed Code Treatment Minutes: 28 Minutes       Nafisa Boo, PT           
in BLE exercises x10 INDEP by 6/14  Short Term Goal 5: Pt will navigate up<>down 4 steps with SBA  Patient Goals   Patient Goals : \"to do stairs\"    Education  Patient Education  Education Given To: Patient  Education Provided: Role of Therapy;Plan of Care;Precautions;Transfer Training;Energy Conservation;Mobility Training  Education Provided Comments: Disease Specific Education: Pt educated on sternal precautions (no pushing, pulling, lifting), safe mobility, and task modification.  Education Method: Verbal;Demonstration  Barriers to Learning: None  Education Outcome: Verbalized understanding;Continued education needed    AM-PAC - Mobility    AM-PAC Basic Mobility - Inpatient   How much help is needed turning from your back to your side while in a flat bed without using bedrails?: None  How much help is needed moving from lying on your back to sitting on the side of a flat bed without using bedrails?: A Little  How much help is needed moving to and from a bed to a chair?: None  How much help is needed standing up from a chair using your arms?: None  How much help is needed walking in hospital room?: None  How much help is needed climbing 3-5 steps with a railing?: A Little  AM-PAC Inpatient Mobility Raw Score : 22  AM-PAC Inpatient T-Scale Score : 53.28  Mobility Inpatient CMS 0-100% Score: 20.91  Mobility Inpatient CMS G-Code Modifier : CJ         Therapy Time   Individual Concurrent Group Co-treatment   Time In       1557   Time Out       1620   Minutes       23   Timed Code Treatment Minutes: 23 Minutes       Nafisa Boo, PT           
ordered    Multi-Disciplinary Rounds with Case Management completed on 6/6/2025 with the following recs:     Anticipated Discharge Location: Home     Anticipated Discharge Day/Date:  6/7/25    Barriers to Discharge: Clinical Course    Likely rate limiting factor: Pending work up    --------------------------------------------------    MDM (any 2 required for High level billing)    A. Problems (any 1)  [x] Acute/Chronic Illness/injury posing ongoing threat to life and/or bodily function without ongoing treatment    [] Severe exacerbation of chronic illness    --------------------------------------------------  B. Risk of Treatment (any 1)    [x] Drugs/treatments that require intensive monitoring for toxicity    [] IV ABX (Vancomycin, Aminoglycosides, etc)     [] Post-Cath/Contrast study requiring serial monitoring    [] IV Narcotic analgesia    [] Aggressive IV diuresis    [] Hypertonic Saline    [] Critical electrolyte abnormalities requiring IV replacement    [] Insulin - Scheduled/SSI or Insulin gtt    [x] Anticoagulation (Heparin gtt or Coumadin - other anticoagulants in special circumstances)    [] Cardiac Medications (IV Amiodarone/Diltiazem, Tikosyn, etc)    [] Hemodialysis    [] Other -    [] Change in code status    [] Decision to escalate care    [x] Major surgery/procedure with associated risk factors    --------------------------------------------------  C. Data (any 2)    [] Data Review (any 3)    [x] Consultant/subspecialist notes from yesterday/today    [x] All available current labs reviewed interpreted for clinical significance    [x] Appropriate follow-up labs were ordered  [] Collateral history obtained     [] Independent Interpretation of tests (any 1)    [] Telemetry (Rhythm Strip) personally reviewed and interpreted        [] Imaging personally reviewed and interpreted     [x] Discussion (any 1)  [x] Multi-Disciplinary Rounds with Case Management  [] Discussed management of the case with         
excludes any time that may have been spent performing procedures. This includes but not limited to vital sign monitoring, telemetry monitoring, continuous pulse oximety, IV medication, clinical response to the IV medications, documentation time , consultation time, interpretation of lab data, review of nursing notes and old record review.        Gomez Perry MD, FACC, [facility]  Interventional Cardiology  [facility]  [phone] (c)  6/6/2025       Inadvertent computerized transcription errors may be present

## 2025-06-12 NOTE — DISCHARGE INSTR - MEDS
comfortable and able to increase activity         level. Your need for pain pills should decrease over the next 2 weeks.  For mild pain you take Tylenol, but do not exceed 4000mg of Tylenol a day.  Vicodin contains Tylenol,  so watch how much Tylenol (Acetaminophen) you take  Stool Softners: You may have been prescribed Colace (docusate), to help prevent straining with bowel movements.  If your bowels have not moved by the second day home, take a laxative of your choice.  If no bowel movement by the third day, call your surgeon.  If you find you have the opposite problem and your stools are too soft, stop taking the stool softener.  Avoid herbal, dietary products and vitamins unless OK’d by your surgeon.  If on Coumadin monitor Vitamin K intake and keep it consistent.            Call during business hours Monday through Friday for medication refills. [phone]      Incision Care:  WASH YOUR INCISIONS DAILY WITH A CLEAN WASHCLOTH AND ANTIBACTERIAL SOAP. Do not wash your incisions after you have cleansed other parts of your body.  You may shower but keep your back to the spray.  Avoid hot water, comfortably warm is OK.  ? If you went home with a dressing on any incision: Remove the dressing daily     and wash the incision with antibacterial soap and water and pat dry. Only     cover the incision with a dressing if it is draining. Otherwise leave it     uncovered (unless your doctor told you otherwise)  ? No tub baths until your incisions are healed.  ? DO NOT APPLY ANYTHING OTHER THAN ANTIBACTERIAL SOAP AND     WATER TO YOUR INCISIONS.  Do not apply lotions, creams, ointments,     hydrogen peroxide or powder.  ? Keep your incisions CLEAN.  Think CLEAN.  No one should touch your     incisions without washing their hands first.  Do not let pets touch your incisions     (have incisions covered with clothing when around pets).  Keep your heart     pillow clean and off the floor.  ? Expect some swelling in the leg

## 2025-06-13 ENCOUNTER — CARE COORDINATION (OUTPATIENT)
Dept: CASE MANAGEMENT | Age: 65
End: 2025-06-13

## 2025-06-13 DIAGNOSIS — I21.4 NSTEMI (NON-ST ELEVATED MYOCARDIAL INFARCTION) (HCC): Primary | ICD-10-CM

## 2025-06-13 PROCEDURE — 1111F DSCHRG MED/CURRENT MED MERGE: CPT | Performed by: NURSE PRACTITIONER

## 2025-06-13 NOTE — CARE COORDINATION
Internal Medicine [phone]            Care Transition Nurse provided contact information.  Plan for follow-up call in 6-10 days based on severity of symptoms and risk factors.  Plan for next call: self management-CABG post op     Darshana Serrato RN

## 2025-06-16 ENCOUNTER — TELEPHONE (OUTPATIENT)
Dept: INTERNAL MEDICINE CLINIC | Age: 65
End: 2025-06-16

## 2025-06-16 ENCOUNTER — CARE COORDINATION (OUTPATIENT)
Dept: CARE COORDINATION | Age: 65
End: 2025-06-16

## 2025-06-16 NOTE — TELEPHONE ENCOUNTER
Care Transitions Initial Follow Up Call    Outreach made within 2 business days of discharge: Yes    Patient: Iris Becerra Patient : 1960   MRN: [id number]  Reason for Admission: surgery  Discharge Date: 25       Spoke with: patient     Discharge department/facility: home    TCM Interactive Patient Contact:  Was patient able to fill all prescriptions: Yes  Was patient instructed to bring all medications to the follow-up visit: Yes  Is patient taking all medications as directed in the discharge summary? Yes  Does patient understand their discharge instructions: Yes  Does patient have questions or concerns that need addressed prior to 7-14 day follow up office visit: yes - none    Additional needs identified to be addressed with provider  No needs identified             Scheduled appointment with PCP within 7-14 days    Follow Up  Future Appointments   Date Time Provider Department Center   2025 12:45 PM Chris Patterson APRN - CNP AND CT SURG [facility]   2025  3:00 PM Sveta Eisenberg APRN - CNP [facility] Car [facility]   2026  7:00 AM Samir Hope APRN - CNP [facility] AND [facility] DEP       [place] MA

## 2025-06-20 ENCOUNTER — CARE COORDINATION (OUTPATIENT)
Dept: CASE MANAGEMENT | Age: 65
End: 2025-06-20

## 2025-06-27 ENCOUNTER — CARE COORDINATION (OUTPATIENT)
Dept: CASE MANAGEMENT | Age: 65
End: 2025-06-27

## 2025-06-27 NOTE — CARE COORDINATION
Care Transitions Note    Follow Up Call     Patient Current Location:  Home: [address] [place] [address]    Care Transition Nurse contacted the patient by telephone. Verified name and  as identifiers.    Additional needs identified to be addressed with provider   No needs identified         Method of communication with provider: none.    Care Summary Note: Patient answered call and verified . Patient pleasant and agreeable to transition call. Patient is doing well and seen by surgeon earlier this week. Visit went well and taking all medication as prescribed. Patient stated she developed UTI and currently taking oral antibiotic. Scheduled to see PCP on Monday to confirm infection has cleared up.   Denied any acute needs at present time.  Agreeable to f/u calls.  Educated on the use of urgent care or physician’s 24 hr access line if assistance is needed after hours.      Plan of care updates since last contact:  Review of patient management of conditions/medications: UTI       Advance Care Planning:   Does patient have an Advance Directive: reviewed during previous call, see note. .    Medication Review:  Full medication review completed during previous call.    Remote Patient Monitoring:  Offered patient enrollment in the Remote Patient Monitoring (RPM) program for in-home monitoring: Yes, but did not enroll at this time: already monitoring with home equipment.    Assessments:  Care Transitions Subsequent and Final Call    Subsequent and Final Calls  Care Transitions Interventions  Other Interventions:              Follow Up Appointment:   Reviewed upcoming appointment(s). and BRIANDA appointment attended as scheduled   Future Appointments         Provider Specialty Dept Phone    2025 9:45 AM Samir Hope APRN - CNP Internal Medicine [phone]    2025 3:00 PM Sveta Eisenberg APRN - CNP Cardiology [phone]    2025 9:45 AM Chris Patterson APRN - CNP Cardiothoracic Surgery [phone]

## 2025-06-30 ENCOUNTER — OFFICE VISIT (OUTPATIENT)
Dept: INTERNAL MEDICINE CLINIC | Age: 65
End: 2025-06-30
Payer: MEDICARE

## 2025-06-30 VITALS
DIASTOLIC BLOOD PRESSURE: 76 MMHG | OXYGEN SATURATION: 99 % | BODY MASS INDEX: 34.65 KG/M2 | WEIGHT: 208 LBS | SYSTOLIC BLOOD PRESSURE: 137 MMHG | HEART RATE: 65 BPM

## 2025-06-30 DIAGNOSIS — I10 ESSENTIAL HYPERTENSION: ICD-10-CM

## 2025-06-30 DIAGNOSIS — N30.91 CYSTITIS WITH HEMATURIA: Primary | ICD-10-CM

## 2025-06-30 PROCEDURE — 3017F COLORECTAL CA SCREEN DOC REV: CPT | Performed by: NURSE PRACTITIONER

## 2025-06-30 PROCEDURE — 3078F DIAST BP <80 MM HG: CPT | Performed by: NURSE PRACTITIONER

## 2025-06-30 PROCEDURE — 1159F MED LIST DOCD IN RCRD: CPT | Performed by: NURSE PRACTITIONER

## 2025-06-30 PROCEDURE — 1036F TOBACCO NON-USER: CPT | Performed by: NURSE PRACTITIONER

## 2025-06-30 PROCEDURE — G8427 DOCREV CUR MEDS BY ELIG CLIN: HCPCS | Performed by: NURSE PRACTITIONER

## 2025-06-30 PROCEDURE — G8417 CALC BMI ABV UP PARAM F/U: HCPCS | Performed by: NURSE PRACTITIONER

## 2025-06-30 PROCEDURE — G8400 PT W/DXA NO RESULTS DOC: HCPCS | Performed by: NURSE PRACTITIONER

## 2025-06-30 PROCEDURE — 1123F ACP DISCUSS/DSCN MKR DOCD: CPT | Performed by: NURSE PRACTITIONER

## 2025-06-30 PROCEDURE — 99213 OFFICE O/P EST LOW 20 MIN: CPT | Performed by: NURSE PRACTITIONER

## 2025-06-30 PROCEDURE — 3075F SYST BP GE 130 - 139MM HG: CPT | Performed by: NURSE PRACTITIONER

## 2025-06-30 PROCEDURE — 1111F DSCHRG MED/CURRENT MED MERGE: CPT | Performed by: NURSE PRACTITIONER

## 2025-06-30 PROCEDURE — 1090F PRES/ABSN URINE INCON ASSESS: CPT | Performed by: NURSE PRACTITIONER

## 2025-06-30 PROCEDURE — 1160F RVW MEDS BY RX/DR IN RCRD: CPT | Performed by: NURSE PRACTITIONER

## 2025-06-30 ASSESSMENT — ENCOUNTER SYMPTOMS
DIARRHEA: 0
VOMITING: 0
COUGH: 0
FACIAL SWELLING: 0
SINUS PRESSURE: 0
NAUSEA: 0
SORE THROAT: 0

## 2025-06-30 NOTE — PROGRESS NOTES
Iris Becerra  1960        Chief Complaint   Patient presents with    Follow-up     Uti last week       Assessment/Plan:     1. Cystitis with hematuria  Send urine culture.   Maintain good water hydration.     - Culture, Urine    2. Essential hypertension  Maintain HCTZ held. Monitor SBP and DBP at home, goal < 140/90.       Return if symptoms worsen or fail to improve.      HPI:      1 week ago f/u Dr Patterson. Pt was having blood in urine and dysuria. Bactrim x 3 days.   Questioning some lingering, dysuria. No blood in urine. No fever. No nausea. No cloudy urine.     HTN, daytime 120 SBP, nighttime 140. Has not restarted HCTZ 12.5mg daily since hospital as Metoprolol has been titrating    /76 (BP Site: Right Upper Arm, Patient Position: Sitting)   Pulse 65   Wt 94.3 kg (208 lb)   LMP  (LMP Unknown)   SpO2 99%   BMI 34.65 kg/m²     Prior to Visit Medications    Medication Sig Taking? Authorizing Provider   metoprolol succinate (TOPROL XL) 50 MG extended release tablet Take 1 tablet by mouth daily Hold for SBP < 100 and/or HR < 60 Yes Chris Patterson APRN - CNP   aspirin 81 MG EC tablet Take 1 tablet by mouth daily Yes Chris Patterson APRN - CNP   amiodarone (CORDARONE) 200 MG tablet Take 1 tablet by mouth daily Yes Chris Patterson APRN - CNP   sennosides-docusate sodium (SENOKOT-S) 8.6-50 MG tablet Take 1 tablet by mouth daily as needed for Constipation Yes Chris Patterson APRN - CNP   pantoprazole (PROTONIX) 40 MG tablet Take 1 tablet by mouth daily Yes Chris Patterson APRN - CNP   ferrous sulfate (IRON 325) 325 (65 Fe) MG tablet Take 1 tablet by mouth 2 times daily Yes Chris Patterson APRN - CNP   apixaban starter pack (ELIQUIS) 5 MG TBPK tablet Take 1 tablet by mouth 2 times daily Yes Eliana Andrew MD   simvastatin (ZOCOR) 10 MG tablet Take 1 tablet by mouth nightly Yes Samir Hope APRN - CNP   SUMATRIPTAN SUCCINATE PO  Yes ProviderEliana MD     Family History   Problem Relation Age of

## 2025-07-03 LAB
BACTERIA UR CULT: ABNORMAL
ORGANISM: ABNORMAL

## 2025-07-06 ENCOUNTER — RESULTS FOLLOW-UP (OUTPATIENT)
Dept: INTERNAL MEDICINE CLINIC | Age: 65
End: 2025-07-06

## 2025-07-06 DIAGNOSIS — N30.91 CYSTITIS WITH HEMATURIA: Primary | ICD-10-CM

## 2025-07-06 RX ORDER — SULFAMETHOXAZOLE AND TRIMETHOPRIM 800; 160 MG/1; MG/1
1 TABLET ORAL 2 TIMES DAILY
Qty: 14 TABLET | Refills: 0 | Status: SHIPPED | OUTPATIENT
Start: 2025-07-06 | End: 2025-07-13

## 2025-07-08 ENCOUNTER — CARE COORDINATION (OUTPATIENT)
Dept: CASE MANAGEMENT | Age: 65
End: 2025-07-08

## 2025-07-08 NOTE — CARE COORDINATION
Care Transitions Note    Final Call     Patient Current Location:  Home: [address] [place] [address]    Care Transition Nurse contacted the patient by telephone. Verified name and  as identifiers.    Patient graduated from the Care Transitions program on 2025.  Patient/family verbalizes confidence in the ability to self-manage at this time. has the ability to self-manage at this time..      Advance Care Planning:   Does patient have an Advance Directive: reviewed and current.    Handoff:   Patient was not referred to the ACM team due to patient declined services.      Care Summary Note: Patient answered call and verified . Patient pleasant and agreeable to final transition call. Doing well and feeling \"really good\". Continues to be taking antibiotics for UTI. Aware to take full prescription. Patient taking vital signs daily and keeping journal. Patient has good appetite and no problem with bowel or bladder. Taking all prescriptions and incisions healed. Denied any acute needs at present time. Stable and transition episode ended. Discussed ACM support, but patient declined.  Educated on the use of urgent care or physician’s 24 hr access line if assistance is needed after hours.        Assessments:  Care Transitions Subsequent and Final Call    Subsequent and Final Calls  Do you have any ongoing symptoms?: No  Have your medications changed?: No  Do you have any questions related to your medications?: No  Do you currently have any active services?: No  Are you currently active with any services?: Home Health  Do you have any needs or concerns that I can assist you with?: No  Identified Barriers: None  Care Transitions Interventions     Other Services: Declined (Comment: home care)   Other Interventions:              Upcoming Appointments:    Future Appointments         Provider Specialty Dept Phone    2025 3:00 PM Sveta Eisenberg, APRN - CNP Cardiology [phone]    2025 9:45 AM Yesenia

## 2025-07-17 ENCOUNTER — OFFICE VISIT (OUTPATIENT)
Dept: CARDIOLOGY CLINIC | Age: 65
End: 2025-07-17
Payer: MEDICARE

## 2025-07-17 ENCOUNTER — ANCILLARY PROCEDURE (OUTPATIENT)
Dept: CARDIOLOGY CLINIC | Age: 65
End: 2025-07-17

## 2025-07-17 VITALS
OXYGEN SATURATION: 97 % | BODY MASS INDEX: 36.28 KG/M2 | SYSTOLIC BLOOD PRESSURE: 122 MMHG | WEIGHT: 212.5 LBS | DIASTOLIC BLOOD PRESSURE: 70 MMHG | HEART RATE: 65 BPM | HEIGHT: 64 IN

## 2025-07-17 DIAGNOSIS — Z95.1 S/P CABG X 2: ICD-10-CM

## 2025-07-17 DIAGNOSIS — C78.6 MALIGNANT NEOPLASM METASTATIC TO PERITONEUM (HCC): ICD-10-CM

## 2025-07-17 DIAGNOSIS — E78.5 DYSLIPIDEMIA: ICD-10-CM

## 2025-07-17 DIAGNOSIS — I97.89 POSTOPERATIVE ATRIAL FIBRILLATION (HCC): ICD-10-CM

## 2025-07-17 DIAGNOSIS — I48.0 PAROXYSMAL ATRIAL FIBRILLATION (HCC): ICD-10-CM

## 2025-07-17 DIAGNOSIS — I48.91 POSTOPERATIVE ATRIAL FIBRILLATION (HCC): ICD-10-CM

## 2025-07-17 DIAGNOSIS — D50.0 IRON DEFICIENCY ANEMIA DUE TO CHRONIC BLOOD LOSS: ICD-10-CM

## 2025-07-17 DIAGNOSIS — I10 PRIMARY HYPERTENSION: ICD-10-CM

## 2025-07-17 DIAGNOSIS — I25.10 CORONARY ARTERY DISEASE INVOLVING NATIVE CORONARY ARTERY OF NATIVE HEART WITHOUT ANGINA PECTORIS: Primary | ICD-10-CM

## 2025-07-17 DIAGNOSIS — C56.1 MALIGNANT NEOPLASM OF RIGHT OVARY (HCC): ICD-10-CM

## 2025-07-17 DIAGNOSIS — Z85.43 HISTORY OF OVARIAN CANCER: ICD-10-CM

## 2025-07-17 DIAGNOSIS — Z78.9 STATIN INTOLERANCE: ICD-10-CM

## 2025-07-17 PROBLEM — G43.909 MIGRAINE HEADACHE: Status: ACTIVE | Noted: 2025-07-17

## 2025-07-17 PROBLEM — C56.9 MALIGNANT NEOPLASM OF OVARY (HCC): Status: ACTIVE | Noted: 2018-05-18

## 2025-07-17 LAB — ECHO BSA: 2.09 M2

## 2025-07-17 PROCEDURE — 1036F TOBACCO NON-USER: CPT | Performed by: NURSE PRACTITIONER

## 2025-07-17 PROCEDURE — 1160F RVW MEDS BY RX/DR IN RCRD: CPT | Performed by: NURSE PRACTITIONER

## 2025-07-17 PROCEDURE — 1159F MED LIST DOCD IN RCRD: CPT | Performed by: NURSE PRACTITIONER

## 2025-07-17 PROCEDURE — 3017F COLORECTAL CA SCREEN DOC REV: CPT | Performed by: NURSE PRACTITIONER

## 2025-07-17 PROCEDURE — 3078F DIAST BP <80 MM HG: CPT | Performed by: NURSE PRACTITIONER

## 2025-07-17 PROCEDURE — G8417 CALC BMI ABV UP PARAM F/U: HCPCS | Performed by: NURSE PRACTITIONER

## 2025-07-17 PROCEDURE — 1123F ACP DISCUSS/DSCN MKR DOCD: CPT | Performed by: NURSE PRACTITIONER

## 2025-07-17 PROCEDURE — 3074F SYST BP LT 130 MM HG: CPT | Performed by: NURSE PRACTITIONER

## 2025-07-17 PROCEDURE — 1090F PRES/ABSN URINE INCON ASSESS: CPT | Performed by: NURSE PRACTITIONER

## 2025-07-17 PROCEDURE — G8400 PT W/DXA NO RESULTS DOC: HCPCS | Performed by: NURSE PRACTITIONER

## 2025-07-17 PROCEDURE — 99215 OFFICE O/P EST HI 40 MIN: CPT | Performed by: NURSE PRACTITIONER

## 2025-07-17 PROCEDURE — G8427 DOCREV CUR MEDS BY ELIG CLIN: HCPCS | Performed by: NURSE PRACTITIONER

## 2025-07-17 RX ORDER — CLINDAMYCIN HYDROCHLORIDE 150 MG/1
150 CAPSULE ORAL 3 TIMES DAILY
Qty: 30 CAPSULE | Refills: 0 | Status: SHIPPED | OUTPATIENT
Start: 2025-07-17 | End: 2025-07-27

## 2025-07-17 NOTE — PROGRESS NOTES
[facility]   Cardiac Evaluation    Primary Care Doctor:  Samir Hope APRN - CNP    Chief Complaint   Patient presents with    Follow-Up from Hospital         DIAGNOSIS:    1. Coronary artery disease involving native coronary artery of native heart without angina pectoris    2. S/P CABG x 2    3. Postoperative atrial fibrillation (HCC)    4. Dyslipidemia    5. Statin intolerance    6. History of ovarian cancer    7. 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 Malignant neoplasm of right ovary (HCC)    9. Primary hypertension        Patient Plan:   Start clindamycin 100 mg 3 times daily for 10 days  Follow up with Mary Patterson tomorrow afternoon - their office will call you  Stop the amiodarone  2 week heart monitor  Referral to cardiac rehab phase II  Continue the current dose of simvastatin 10 mg daily, aspirin 81 mg daily as well as Eliquis 5 mg twice daily  Restart the metoprolol (Toprol XL) 50 mg, half tablet at night  Have blood work to check iron, blood count, chemistry panel  Follow up with me in ~ 6 weeks       Assessment & Plan  1. Post-CABG status:  - Hemoglobin improved from 6.5 to 7.3 during hospitalization, following surgery  - Slightly elevated potassium on 06/12/2025  - High [id number] score of 4  - Current meds: simvastatin 10 mg, metoprolol 50 mg once daily (has not been getting due to bradycardia), aspirin 81 mg, amiodarone 200 mg once daily, Eliquis 5 mg twice daily  - Discussed long-term side effects of amiodarone  - Consider discontinuing amiodarone if no atrial fibrillation detected  - Clarified difference between Eliquis and aspirin  - Emphasized compression socks use  - Referral for cardiac rehab, advised three times a week for 12 weeks  - Okay for return after 08/04/2025 with lifting restrictions for three months  - Blood count and iron level check to monitor hemoglobin  - Two-week heart monitor to confirm normal rhythm; discontinue amiodarone if no atrial

## 2025-07-17 NOTE — PATIENT INSTRUCTIONS
Start clindamycin 100 mg 3 times daily for 10 days  Follow up with Mary Patterson tomorrow afternoon - their office will call you  Stop the amiodarone  2 week heart monitor  Will call you if Mary decides she wants to start an antibiotic  Referral to cardiac rehab phase II  Continue the current dose of simvastatin 10 mg daily, aspirin 81 mg daily as well as Eliquis 5 mg twice daily  Restart the metoprolol (Toprol XL) 50 mg, half tablet at night  Have blood work to check iron, blood count, chemistry panel  Follow up with me in ~ 6 weeks

## 2025-07-17 NOTE — NURSING NOTE
Monitor placed by MA   Monitor company [employer]  Length of monitor 14DAYS  Monitor ordered by ASHOK  Diagnosis: I48.0  CAM/Patch ID [id number]  Phone ID (for [employer]) [id number]  Activation successful prior to pt leaving office? Yes

## 2025-07-18 ENCOUNTER — TELEPHONE (OUTPATIENT)
Dept: CARDIOLOGY CLINIC | Age: 65
End: 2025-07-18

## 2025-07-18 DIAGNOSIS — I25.10 CORONARY ARTERY DISEASE INVOLVING NATIVE CORONARY ARTERY OF NATIVE HEART WITHOUT ANGINA PECTORIS: ICD-10-CM

## 2025-07-18 DIAGNOSIS — N30.91 CYSTITIS WITH HEMATURIA: ICD-10-CM

## 2025-07-18 DIAGNOSIS — D50.0 IRON DEFICIENCY ANEMIA DUE TO CHRONIC BLOOD LOSS: ICD-10-CM

## 2025-07-18 DIAGNOSIS — Z95.1 S/P CABG X 2: ICD-10-CM

## 2025-07-18 DIAGNOSIS — I97.89 POSTOPERATIVE ATRIAL FIBRILLATION (HCC): ICD-10-CM

## 2025-07-18 DIAGNOSIS — I48.91 POSTOPERATIVE ATRIAL FIBRILLATION (HCC): ICD-10-CM

## 2025-07-18 NOTE — TELEPHONE ENCOUNTER
Pt  called in regarding the guidelines for metoprolol. He stated that previously, they held it if her pulse was below 60bpm. Pt was taken off of amiodarone at ov yesterday, 07/17/25, and her pulse was in the 50s this morning. Should pt hold?

## 2025-07-19 LAB
ALBUMIN SERPL-MCNC: 4.2 G/DL (ref 3.4–5)
ALBUMIN/GLOB SERPL: 1.2 {RATIO} (ref 1.1–2.2)
ALP SERPL-CCNC: 95 U/L (ref 40–129)
ALT SERPL-CCNC: 15 U/L (ref 10–40)
AMORPH SED URNS QL MICRO: ABNORMAL /HPF
ANION GAP SERPL CALCULATED.3IONS-SCNC: 11 MMOL/L (ref 3–16)
AST SERPL-CCNC: 23 U/L (ref 15–37)
BACTERIA URNS QL MICRO: ABNORMAL /HPF
BILIRUB SERPL-MCNC: 0.3 MG/DL (ref 0–1)
BILIRUB UR QL STRIP.AUTO: NEGATIVE
BUN SERPL-MCNC: 20 MG/DL (ref 7–20)
CALCIUM SERPL-MCNC: 9.5 MG/DL (ref 8.3–10.6)
CHARACTER UR: ABNORMAL
CHLORIDE SERPL-SCNC: 103 MMOL/L (ref 99–110)
CLARITY UR: ABNORMAL
CO2 SERPL-SCNC: 26 MMOL/L (ref 21–32)
COLOR UR: YELLOW
CREAT SERPL-MCNC: 1 MG/DL (ref 0.6–1.2)
DEPRECATED RDW RBC AUTO: 19.5 % (ref 12.4–15.4)
EPI CELLS #/AREA URNS HPF: ABNORMAL /HPF (ref 0–5)
FERRITIN SERPL IA-MCNC: 1007 NG/ML (ref 15–150)
GFR SERPLBLD CREATININE-BSD FMLA CKD-EPI: 62 ML/MIN/{1.73_M2}
GLUCOSE SERPL-MCNC: 95 MG/DL (ref 70–99)
GLUCOSE UR STRIP.AUTO-MCNC: NEGATIVE MG/DL
HCT VFR BLD AUTO: 35.7 % (ref 36–48)
HGB BLD-MCNC: 11.3 G/DL (ref 12–16)
HGB UR QL STRIP.AUTO: NEGATIVE
IRON SATN MFR SERPL: 18 % (ref 15–50)
IRON SERPL-MCNC: 54 UG/DL (ref 37–145)
KETONES UR STRIP.AUTO-MCNC: NEGATIVE MG/DL
LEUKOCYTE ESTERASE UR QL STRIP.AUTO: ABNORMAL
MAGNESIUM SERPL-MCNC: 1.65 MG/DL (ref 1.8–2.4)
MCH RBC QN AUTO: 30.7 PG (ref 26–34)
MCHC RBC AUTO-ENTMCNC: 31.6 G/DL (ref 31–36)
MCV RBC AUTO: 97 FL (ref 80–100)
NITRITE UR QL STRIP.AUTO: NEGATIVE
PH UR STRIP.AUTO: 5 [PH] (ref 5–8)
PLATELET # BLD AUTO: 249 K/UL (ref 135–450)
PMV BLD AUTO: 9.6 FL (ref 5–10.5)
POTASSIUM SERPL-SCNC: 4.3 MMOL/L (ref 3.5–5.1)
PROT SERPL-MCNC: 7.8 G/DL (ref 6.4–8.2)
PROT UR STRIP.AUTO-MCNC: NEGATIVE MG/DL
RBC # BLD AUTO: 3.68 M/UL (ref 4–5.2)
RBC #/AREA URNS HPF: ABNORMAL /HPF (ref 0–4)
SODIUM SERPL-SCNC: 140 MMOL/L (ref 136–145)
SP GR UR STRIP.AUTO: 1.02 (ref 1–1.03)
TIBC SERPL-MCNC: 305 UG/DL (ref 260–445)
UA DIPSTICK W REFLEX MICRO PNL UR: YES
URN SPEC COLLECT METH UR: ABNORMAL
UROBILINOGEN UR STRIP-ACNC: 0.2 E.U./DL
WBC # BLD AUTO: 4.6 K/UL (ref 4–11)
WBC #/AREA URNS HPF: ABNORMAL /HPF (ref 0–5)

## 2025-07-20 LAB — BACTERIA UR CULT: NORMAL

## 2025-07-21 ENCOUNTER — OFFICE VISIT (OUTPATIENT)
Dept: INTERNAL MEDICINE CLINIC | Age: 65
End: 2025-07-21
Payer: MEDICARE

## 2025-07-21 ENCOUNTER — RESULTS FOLLOW-UP (OUTPATIENT)
Dept: CARDIOLOGY | Age: 65
End: 2025-07-21

## 2025-07-21 VITALS
SYSTOLIC BLOOD PRESSURE: 139 MMHG | OXYGEN SATURATION: 97 % | DIASTOLIC BLOOD PRESSURE: 89 MMHG | WEIGHT: 212 LBS | BODY MASS INDEX: 36.39 KG/M2 | HEART RATE: 61 BPM

## 2025-07-21 DIAGNOSIS — L30.9 DERMATITIS: Primary | ICD-10-CM

## 2025-07-21 PROCEDURE — 1159F MED LIST DOCD IN RCRD: CPT | Performed by: NURSE PRACTITIONER

## 2025-07-21 PROCEDURE — 3017F COLORECTAL CA SCREEN DOC REV: CPT | Performed by: NURSE PRACTITIONER

## 2025-07-21 PROCEDURE — G8400 PT W/DXA NO RESULTS DOC: HCPCS | Performed by: NURSE PRACTITIONER

## 2025-07-21 PROCEDURE — 3079F DIAST BP 80-89 MM HG: CPT | Performed by: NURSE PRACTITIONER

## 2025-07-21 PROCEDURE — G8417 CALC BMI ABV UP PARAM F/U: HCPCS | Performed by: NURSE PRACTITIONER

## 2025-07-21 PROCEDURE — 1036F TOBACCO NON-USER: CPT | Performed by: NURSE PRACTITIONER

## 2025-07-21 PROCEDURE — 99213 OFFICE O/P EST LOW 20 MIN: CPT | Performed by: NURSE PRACTITIONER

## 2025-07-21 PROCEDURE — 1123F ACP DISCUSS/DSCN MKR DOCD: CPT | Performed by: NURSE PRACTITIONER

## 2025-07-21 PROCEDURE — 1090F PRES/ABSN URINE INCON ASSESS: CPT | Performed by: NURSE PRACTITIONER

## 2025-07-21 PROCEDURE — 3075F SYST BP GE 130 - 139MM HG: CPT | Performed by: NURSE PRACTITIONER

## 2025-07-21 PROCEDURE — G8427 DOCREV CUR MEDS BY ELIG CLIN: HCPCS | Performed by: NURSE PRACTITIONER

## 2025-07-21 RX ORDER — TRIAMCINOLONE ACETONIDE 1 MG/G
CREAM TOPICAL
Qty: 15 G | Refills: 0 | Status: SHIPPED | OUTPATIENT
Start: 2025-07-21

## 2025-07-21 NOTE — PROGRESS NOTES
Iris Becerra  1960        Chief Complaint   Patient presents with    Other     Spot on arm left arm [place] since saturday       Assessment/Plan:     1. Dermatitis  Triamcinolone cream BID until resolution. If no improvement, consider derm referral.    See media for photo      Return if symptoms worsen or fail to improve.      HPI:      Noticed red area on L arm, has decreased in size over 2 days. No pain. No itch. No drainage.   No new areas on body. No fever. No topical agents have been used. Did not work in yard.    Started on abx earlier that same day for incision that was not healing with oncology.       /89 (BP Site: Right Upper Arm, Patient Position: Sitting)   Pulse 61   Wt 96.2 kg (212 lb)   LMP  (LMP Unknown)   SpO2 97%   BMI 36.39 kg/m²     Prior to Visit Medications    Medication Sig Taking? Authorizing Provider   triamcinolone (KENALOG) 0.1 % cream Apply topically 2 times daily. Yes Samir Hope APRN - CNP   clindamycin (CLEOCIN) 150 MG capsule Take 1 capsule by mouth 3 times daily for 10 days Yes Sveta Eisenberg APRN - CNP   aspirin 81 MG EC tablet Take 1 tablet by mouth daily Yes Karma Lynn APRN - CNP   metoprolol succinate (TOPROL XL) 50 MG extended release tablet Take 1 tablet by mouth daily Hold SBP < 100 and/or HR < 60 Yes Karma Lynn APRN - CNP   apixaban starter pack (ELIQUIS) 5 MG TBPK tablet Take 1 tablet by mouth 2 times daily Yes Provider, MD Eliana   simvastatin (ZOCOR) 10 MG tablet Take 1 tablet by mouth nightly Yes Samir Hope APRN - CNP     Family History   Problem Relation Age of Onset    Diabetes Mother     High Blood Pressure Mother     Cancer Mother         UTERINE W/METS BRAIN    COPD Father     Asthma Sister     Thyroid Disease Sister     Heart Disease Sister     Sleep Apnea Sister     Thyroid Disease Sister      Social History     Socioeconomic History    Marital status:      Spouse name: Not on file    Number of

## 2025-07-29 ENCOUNTER — TELEPHONE (OUTPATIENT)
Dept: CARDIOLOGY CLINIC | Age: 65
End: 2025-07-29

## 2025-07-29 NOTE — TELEPHONE ENCOUNTER
Pt stopped in main suite.  Dropped off \"Return to Work\" form that needs completed by [employer].  Pt stated that planned on returning to work on 8/4/2025 with lifting restrictions.  Pt requesting this be put on form as well.  Please contact pt to advise will able to pu.     Placed in [employer] folder

## 2025-07-30 NOTE — TELEPHONE ENCOUNTER
Spoke to [employer] verbally, [employer] states this needs to be filled out by CT surgeon not us. Spoke to pt, pt v/u. Ppw placed at  for pickup

## 2025-08-04 ENCOUNTER — HOSPITAL ENCOUNTER (OUTPATIENT)
Dept: CARDIAC REHAB | Age: 65
Setting detail: THERAPIES SERIES
Discharge: HOME | End: 2025-08-04
Payer: MEDICARE

## 2025-08-04 PROCEDURE — 93798 PHYS/QHP OP CAR RHAB W/ECG: CPT

## 2025-08-06 ENCOUNTER — HOSPITAL ENCOUNTER (OUTPATIENT)
Dept: CARDIAC REHAB | Age: 65
Setting detail: THERAPIES SERIES
Discharge: HOME | End: 2025-08-06
Payer: MEDICARE

## 2025-08-06 PROCEDURE — 93798 PHYS/QHP OP CAR RHAB W/ECG: CPT

## 2025-08-07 LAB — ECHO BSA: 2.09 M2

## 2025-08-08 ENCOUNTER — HOSPITAL ENCOUNTER (OUTPATIENT)
Dept: CARDIAC REHAB | Age: 65
Setting detail: THERAPIES SERIES
Discharge: HOME | End: 2025-08-08
Payer: MEDICARE

## 2025-08-08 PROCEDURE — 93798 PHYS/QHP OP CAR RHAB W/ECG: CPT

## 2025-08-11 ENCOUNTER — HOSPITAL ENCOUNTER (OUTPATIENT)
Dept: CARDIAC REHAB | Age: 65
Setting detail: THERAPIES SERIES
Discharge: HOME | End: 2025-08-11
Payer: MEDICARE

## 2025-08-11 PROCEDURE — 93798 PHYS/QHP OP CAR RHAB W/ECG: CPT

## 2025-08-13 ENCOUNTER — HOSPITAL ENCOUNTER (OUTPATIENT)
Dept: CARDIAC REHAB | Age: 65
Setting detail: THERAPIES SERIES
Discharge: HOME | End: 2025-08-13
Payer: MEDICARE

## 2025-08-13 PROCEDURE — 93798 PHYS/QHP OP CAR RHAB W/ECG: CPT

## 2025-08-15 ENCOUNTER — HOSPITAL ENCOUNTER (OUTPATIENT)
Dept: CARDIAC REHAB | Age: 65
Setting detail: THERAPIES SERIES
Discharge: HOME | End: 2025-08-15
Payer: MEDICARE

## 2025-08-15 PROCEDURE — 93798 PHYS/QHP OP CAR RHAB W/ECG: CPT

## 2025-08-18 ENCOUNTER — HOSPITAL ENCOUNTER (OUTPATIENT)
Dept: CARDIAC REHAB | Age: 65
Setting detail: THERAPIES SERIES
Discharge: HOME | End: 2025-08-18
Payer: MEDICARE

## 2025-08-18 PROCEDURE — 93798 PHYS/QHP OP CAR RHAB W/ECG: CPT

## 2025-08-20 ENCOUNTER — HOSPITAL ENCOUNTER (OUTPATIENT)
Dept: CARDIAC REHAB | Age: 65
Setting detail: THERAPIES SERIES
Discharge: HOME | End: 2025-08-20
Payer: MEDICARE

## 2025-08-20 PROCEDURE — 93798 PHYS/QHP OP CAR RHAB W/ECG: CPT

## 2025-08-22 ENCOUNTER — HOSPITAL ENCOUNTER (OUTPATIENT)
Dept: CARDIAC REHAB | Age: 65
Setting detail: THERAPIES SERIES
Discharge: HOME | End: 2025-08-22
Payer: MEDICARE

## 2025-08-22 PROCEDURE — 93798 PHYS/QHP OP CAR RHAB W/ECG: CPT

## 2025-08-25 ENCOUNTER — HOSPITAL ENCOUNTER (OUTPATIENT)
Dept: CARDIAC REHAB | Age: 65
Setting detail: THERAPIES SERIES
Discharge: HOME | End: 2025-08-25
Payer: MEDICARE

## 2025-08-25 PROCEDURE — 93798 PHYS/QHP OP CAR RHAB W/ECG: CPT

## 2025-08-27 ENCOUNTER — HOSPITAL ENCOUNTER (OUTPATIENT)
Dept: CARDIAC REHAB | Age: 65
Setting detail: THERAPIES SERIES
Discharge: HOME | End: 2025-08-27
Payer: MEDICARE

## 2025-08-27 PROCEDURE — 93798 PHYS/QHP OP CAR RHAB W/ECG: CPT

## 2025-08-29 ENCOUNTER — HOSPITAL ENCOUNTER (OUTPATIENT)
Dept: CARDIAC REHAB | Age: 65
Setting detail: THERAPIES SERIES
Discharge: HOME | End: 2025-08-29
Payer: MEDICARE

## 2025-08-29 PROCEDURE — 93798 PHYS/QHP OP CAR RHAB W/ECG: CPT

## 2025-09-03 ENCOUNTER — HOSPITAL ENCOUNTER (OUTPATIENT)
Dept: CARDIAC REHAB | Age: 65
Setting detail: THERAPIES SERIES
Discharge: HOME | End: 2025-09-03
Payer: MEDICARE

## 2025-09-03 PROCEDURE — 93798 PHYS/QHP OP CAR RHAB W/ECG: CPT

## 2025-09-05 ENCOUNTER — HOSPITAL ENCOUNTER (OUTPATIENT)
Dept: CARDIAC REHAB | Age: 65
Setting detail: THERAPIES SERIES
Discharge: HOME | End: 2025-09-05
Payer: MEDICARE

## 2025-09-05 PROCEDURE — 93798 PHYS/QHP OP CAR RHAB W/ECG: CPT

## (undated) DEVICE — CATHETERIZATION KIT 7 FRX16 CM 3L

## (undated) DEVICE — LIQUIBAND RAPID ADHESIVE 36/CS 0.8ML: Brand: MEDLINE

## (undated) DEVICE — [HIGH FLOW INSUFFLATOR,  DO NOT USE IF PACKAGE IS DAMAGED,  KEEP DRY,  KEEP AWAY FROM SUNLIGHT,  PROTECT FROM HEAT AND RADIOACTIVE SOURCES.]: Brand: PNEUMOSURE

## (undated) DEVICE — AGENT TOP HEMSTAT FOAM DISC STATSEAL L 10 - 14FR

## (undated) DEVICE — GLOVE SURG SZ 8 L11.77IN FNGR THK9.8MIL STRW LTX POLYMER

## (undated) DEVICE — GLIDESHEATH SLENDER STAINLESS STEEL KIT: Brand: GLIDESHEATH SLENDER

## (undated) DEVICE — Device

## (undated) DEVICE — CANNULA PERF L2IN BLNT TIP 3MM VES CLR RADPQ BODY FEM LUER D

## (undated) DEVICE — CATHETER THOR 28FR L23CM DIA9.3MM POLYVI CHL TAPR CONN TIP

## (undated) DEVICE — TAPE MED W1/8XL30IN WHT POLY

## (undated) DEVICE — BLANKET WRM W25XL64IN NONWOVEN SFT LTWT PLIABLE HYPR

## (undated) DEVICE — 20 ML SYRINGE LUER-LOCK TIP: Brand: MONOJECT

## (undated) DEVICE — SUTURE NONABSORBABLE MONOFILAMENT 7-0 BV-1 1X24 IN PROLENE 8702H

## (undated) DEVICE — SUTURE VICRYL + SZ 3-0 L27IN ABSRB UD L26MM SH 1/2 CIR VCP416H

## (undated) DEVICE — CATHETER DIAG 5FR L100CM LUMN ID0.047IN JR4 CRV 0 SIDE H

## (undated) DEVICE — ASPIRATION/ANTICOAGULATION SET: Brand: HAEMONETICS CELL SAVER SYSTEM

## (undated) DEVICE — RESERVOIR,SUCTION,100CC,SILICONE: Brand: MEDLINE

## (undated) DEVICE — KIT BLWR MISTER 5P 15L W/ TBNG SET IRRIG MIST TO IMPROVE

## (undated) DEVICE — SUTURE PERMA-HAND SZ 2-0 L30IN NONABSORBABLE BLK L26MM SH K833H

## (undated) DEVICE — CATHETER DIAG 5FR L100CM LUMN ID0.047IN JL3.5 CRV 0 SIDE H

## (undated) DEVICE — STERNUM BLADE, OFFSET (31.7 X 0.64 X 6.3MM)

## (undated) DEVICE — TR BAND RADIAL ARTERY COMPRESSION DEVICE: Brand: TR BAND

## (undated) DEVICE — TUBING, SUCTION, 3/16" X 12', STRAIGHT: Brand: MEDLINE

## (undated) DEVICE — BLADE ES ELASTOMERIC COAT INSUL DURABLE BEND UPTO 90DEG

## (undated) DEVICE — Device: Brand: PERFECTCUT ROTATING AORTIC PUNCH

## (undated) DEVICE — RETRACTOR SURG INSRT SUT HLD OCTOBASE

## (undated) DEVICE — DRAIN SURG SGL COLL PT TB FOR ATS BG OASIS

## (undated) DEVICE — SUTURE NONABSORBABLE MONOFILAMENT 5-0 C-1 1X24 IN PROLENE 8725H

## (undated) DEVICE — SOLUTION IRRIG 2000ML 0.9% SOD CHL USP UROMATIC PLAS CONT

## (undated) DEVICE — APPLICATOR MEDICATED 26 CC SOLUTION HI LT ORNG CHLORAPREP

## (undated) DEVICE — RESERVOIR AUTOTRANSFUSION 225/120 CC GS FILTERED XTRA

## (undated) DEVICE — CATHETER COR DIAG PIGTAILS PIG STR CRV 5FR 125CM 6 SIDE H

## (undated) DEVICE — LINE TBL CSC-14 FOR CARDPLG DEL SYS

## (undated) DEVICE — SOLUTION IRRIG 1000ML 09% SOD CHL USP PIC PLAS CONTAINER

## (undated) DEVICE — SPONGE LAP W18XL18IN WHT COT 4 PLY FLD STRUNG RADPQ DISP ST 2 PER PACK

## (undated) DEVICE — PACK SUCT CATHETER 14FR OPN WHSTL W NO VLV

## (undated) DEVICE — GOWN SIRUS NONREIN LG W/TWL: Brand: MEDLINE INDUSTRIES, INC.

## (undated) DEVICE — 3M™ STERI-DRAPE™ INSTRUMENT POUCH 1018: Brand: STERI-DRAPE™

## (undated) DEVICE — STANDARD HYPODERMIC NEEDLE,POLYPROPYLENE HUB: Brand: MONOJECT

## (undated) DEVICE — BLADE OPHTH 180DEG CUT SURF BLU STR SHRP DBL BVL GRINDLESS

## (undated) DEVICE — HANDPIECE SET WITH HIGH FLOW TIP AND SUCTION TUBE: Brand: INTERPULSE

## (undated) DEVICE — SUTURE PROL SZ 4-0 L36IN NONABSORBABLE BLU BB L17MM 3/8 CIR 8581H

## (undated) DEVICE — SUTURE VICRYL + SZ 2-0 L36IN ABSRB UD L36MM CT-1 1/2 CIR VCP945H

## (undated) DEVICE — SUTURE PROL SZ 4-0 L36IN NONABSORBABLE BLU L26MM SH 1/2 CIR 8521H

## (undated) DEVICE — PROCEDURE SET TOP OUTLT 04257

## (undated) DEVICE — SYSTEM ENDOSCP VES HARV W/ TOOL CANN SEAL SHT PRT BLNT TIP

## (undated) DEVICE — DECANTER BAG 9": Brand: MEDLINE INDUSTRIES, INC.

## (undated) DEVICE — KIT ART LN 20GA L12CM FEP RADPQ 0.025X13.75IN SPR GWIRE

## (undated) DEVICE — SUTURE PERMAHAND SZ 2-0 L30IN NONABSORBABLE BLK SH L26MM C016D

## (undated) DEVICE — SPONGE,LAP,4"X18",XR,ST,5/PK,40PK/CS: Brand: MEDLINE INDUSTRIES, INC.

## (undated) DEVICE — SUTURE VICRYL + SZ 0 L27IN ABSRB UD CT-1 L36MM 1/2 CIR TAPR VCP260H

## (undated) DEVICE — CATH LAB PACK: Brand: MEDLINE INDUSTRIES, INC.

## (undated) DEVICE — EVERGRIP INSERT SET 86MM: Brand: FOGARTY EVERGRIP

## (undated) DEVICE — KIT,ANTI FOG,W/SPONGE & FLUID,SOFT PACK: Brand: MEDLINE

## (undated) DEVICE — SHIELD RAD ANGIO FEM ENTRY W/ ABSORBENT ORNG STRL RADPAD LTX

## (undated) DEVICE — APPLICATOR MEDICATED 10.5 CC SOLUTION HI LT ORNG CHLORAPREP

## (undated) DEVICE — SUTURE PROL SZ 7-0 L24IN NONABSORBABLE BLU L8MM BV175-6 3/8 8735H

## (undated) DEVICE — NDL ULTRA ONLY 21G X 3": Brand: MEDLINE INDUSTRIES, INC.

## (undated) DEVICE — COVER LT HNDL PLAS RIG 2 PER PK

## (undated) DEVICE — 3M™ TEGADERM™ TRANSPARENT FILM DRESSING FRAME STYLE WITH BORDER, 1616, 4 IN X 4-3/4 IN (10 CM X 12 CM), 50/CT 4CT/CASE: Brand: 3M™ TEGADERM™

## (undated) DEVICE — POST-SURG COMPRESSION BRA,XXL: Brand: MEDLINE

## (undated) DEVICE — SUTURE MONOCRYL + SZ 4-0 L18IN ABSRB UD L19MM PS-2 3/8 CIR MCP496G

## (undated) DEVICE — COVER US PRB W12XL244CM SURGICAL INTRAOPERATIVE PLAS TAPR L

## (undated) DEVICE — BLANKET WRM CARD FOR MISTRAL AIR WRM SYS

## (undated) DEVICE — CATHETER THORACENTESIS STR 28 FRX23 IN 6 EYELET TAPR TIP LF

## (undated) DEVICE — HOVERMATT HALF-MATT 34" W X 45" L

## (undated) DEVICE — GUIDEWIRE VASC L260CM DIA0.035IN RAD 3MM J TIP L7CM PTFE

## (undated) DEVICE — Device: Brand: RETRACT-O-TAPE 18G X 30.5CM BLUNT NEEDLE

## (undated) DEVICE — CLIP SM RED INTERN HMOCLP TITAN LIGATING